# Patient Record
Sex: FEMALE | Race: BLACK OR AFRICAN AMERICAN | Employment: OTHER | URBAN - METROPOLITAN AREA
[De-identification: names, ages, dates, MRNs, and addresses within clinical notes are randomized per-mention and may not be internally consistent; named-entity substitution may affect disease eponyms.]

---

## 2017-10-06 RX ORDER — DEXTROMETHORPHAN/PSEUDOEPHED 2.5-7.5/.8
1.2 DROPS ORAL
Status: CANCELLED | OUTPATIENT
Start: 2017-10-06

## 2017-10-06 RX ORDER — ATROPINE SULFATE 0.1 MG/ML
0.5 INJECTION INTRAVENOUS
Status: CANCELLED | OUTPATIENT
Start: 2017-10-06 | End: 2017-10-07

## 2017-10-06 RX ORDER — EPINEPHRINE 0.1 MG/ML
1 INJECTION INTRACARDIAC; INTRAVENOUS
Status: CANCELLED | OUTPATIENT
Start: 2017-10-06 | End: 2017-10-07

## 2017-10-09 ENCOUNTER — HOSPITAL ENCOUNTER (OUTPATIENT)
Age: 62
Setting detail: OUTPATIENT SURGERY
Discharge: HOME | End: 2017-10-09
Attending: INTERNAL MEDICINE | Admitting: INTERNAL MEDICINE
Payer: COMMERCIAL

## 2017-10-09 VITALS
HEART RATE: 53 BPM | OXYGEN SATURATION: 98 % | WEIGHT: 209 LBS | RESPIRATION RATE: 16 BRPM | SYSTOLIC BLOOD PRESSURE: 110 MMHG | DIASTOLIC BLOOD PRESSURE: 71 MMHG | TEMPERATURE: 96 F | BODY MASS INDEX: 37.03 KG/M2 | HEIGHT: 63 IN

## 2017-10-09 PROCEDURE — G0500 MOD SEDAT ENDO SERVICE >5YRS: HCPCS | Performed by: INTERNAL MEDICINE

## 2017-10-09 PROCEDURE — 76040000007: Performed by: INTERNAL MEDICINE

## 2017-10-09 PROCEDURE — 74011250636 HC RX REV CODE- 250/636

## 2017-10-09 PROCEDURE — 74011250636 HC RX REV CODE- 250/636: Performed by: INTERNAL MEDICINE

## 2017-10-09 PROCEDURE — 77030020256 HC SOL INJ NACL 0.9%  500ML: Performed by: INTERNAL MEDICINE

## 2017-10-09 RX ORDER — SODIUM CHLORIDE 9 MG/ML
100 INJECTION, SOLUTION INTRAVENOUS CONTINUOUS
Status: DISCONTINUED | OUTPATIENT
Start: 2017-10-09 | End: 2017-10-09 | Stop reason: HOSPADM

## 2017-10-09 RX ORDER — FLUMAZENIL 0.1 MG/ML
0.2 INJECTION INTRAVENOUS
Status: DISCONTINUED | OUTPATIENT
Start: 2017-10-09 | End: 2017-10-09 | Stop reason: HOSPADM

## 2017-10-09 RX ORDER — MIDAZOLAM HYDROCHLORIDE 1 MG/ML
.5-5 INJECTION, SOLUTION INTRAMUSCULAR; INTRAVENOUS
Status: DISCONTINUED | OUTPATIENT
Start: 2017-10-09 | End: 2017-10-09 | Stop reason: HOSPADM

## 2017-10-09 RX ORDER — FENTANYL CITRATE 50 UG/ML
100 INJECTION, SOLUTION INTRAMUSCULAR; INTRAVENOUS
Status: DISCONTINUED | OUTPATIENT
Start: 2017-10-09 | End: 2017-10-09 | Stop reason: HOSPADM

## 2017-10-09 RX ORDER — NALOXONE HYDROCHLORIDE 0.4 MG/ML
0.4 INJECTION, SOLUTION INTRAMUSCULAR; INTRAVENOUS; SUBCUTANEOUS
Status: DISCONTINUED | OUTPATIENT
Start: 2017-10-09 | End: 2017-10-09 | Stop reason: HOSPADM

## 2017-10-09 RX ADMIN — SODIUM CHLORIDE 100 ML/HR: 900 INJECTION, SOLUTION INTRAVENOUS at 08:56

## 2017-10-09 NOTE — DISCHARGE INSTRUCTIONS
Patient armband removed and shredded    DISCHARGE SUMMARY from Nurse    The following personal items are in your possession at time of discharge:    Dental Appliances: None  Visual Aid: Glasses               Colonoscopy: What to Expect at Home  Your Recovery  After you have a colonoscopy, you will stay at the clinic for 1 to 2 hours until the medicines wear off. Then you can go home. But you will need to arrange for a ride. Your doctor will tell you when you can eat and do your other usual activities. Your doctor will talk to you about when you will need your next colonoscopy. Your doctor can help you decide how often you need to be checked. This will depend on the results of your test and your risk for colorectal cancer. After the test, you may be bloated or have gas pains. You may need to pass gas. If a biopsy was done or a polyp was removed, you may have streaks of blood in your stool (feces) for a few days. This care sheet gives you a general idea about how long it will take for you to recover. But each person recovers at a different pace. Follow the steps below to get better as quickly as possible. How can you care for yourself at home? Activity  · Rest when you feel tired. · You can do your normal activities when it feels okay to do so. Diet  · Follow your doctor's directions for eating. · Unless your doctor has told you not to, drink plenty of fluids. This helps to replace the fluids that were lost during the colon prep. · Do not drink alcohol. Medicines  · Your doctor will tell you if and when you can restart your medicines. He or she will also give you instructions about taking any new medicines. · If you take blood thinners, such as warfarin (Coumadin), clopidogrel (Plavix), or aspirin, be sure to talk to your doctor. He or she will tell you if and when to start taking those medicines again. Make sure that you understand exactly what your doctor wants you to do.   · If polyps were removed or a biopsy was done during the test, your doctor may tell you not to take aspirin or other anti-inflammatory medicines for a few days. These include ibuprofen (Advil, Motrin) and naproxen (Aleve). Other instructions  · For your safety, do not drive or operate machinery until the medicine wears off and you can think clearly. Your doctor may tell you not to drive or operate machinery until the day after your test.  · Do not sign legal documents or make major decisions until the medicine wears off and you can think clearly. The anesthesia can make it hard for you to fully understand what you are agreeing to. Follow-up care is a key part of your treatment and safety. Be sure to make and go to all appointments, and call your doctor if you are having problems. It's also a good idea to know your test results and keep a list of the medicines you take. When should you call for help? Call 911 anytime you think you may need emergency care. For example, call if:  · You passed out (lost consciousness). · You pass maroon or bloody stools. · You have severe belly pain. Call your doctor now or seek immediate medical care if:  · Your stools are black and tarlike. · Your stools have streaks of blood, but you did not have a biopsy or any polyps removed. · You have belly pain, or your belly is swollen and firm. · You vomit. · You have a fever. · You are very dizzy. Watch closely for changes in your health, and be sure to contact your doctor if you have any problems. Where can you learn more? Go to http://shay-anisa.info/. Enter E264 in the search box to learn more about \"Colonoscopy: What to Expect at Home. \"  Current as of: August 9, 2016  Content Version: 11.3  © 7501-9951 [employer]. Care instructions adapted under license by [employer] (which disclaims liability or warranty for this information).  If you have questions about a medical condition or this instruction, always ask your healthcare professional. [address] any warranty or liability for your use of this information. PATIENT INSTRUCTIONS:    After general anesthesia or intravenous sedation, for 24 hours or while taking prescription Narcotics:  · Limit your activities  · Do not drive and operate hazardous machinery  · Do not make important personal or business decisions  · Do  not drink alcoholic beverages  · If you have not urinated within 8 hours after discharge, please contact your surgeon on call. Report the following to your surgeon:  · Excessive pain, swelling, redness or odor of or around the surgical area  · Temperature over 100.5  · Nausea and vomiting lasting longer than 4 hours or if unable to take medications  · Any signs of decreased circulation or nerve impairment to extremity: change in color, persistent  numbness, tingling, coldness or increase pain  · Any questions        What to do at Home:  Recommended activity: Activity as tolerated and no driving for today,     If you experience any of the following symptoms as above, please follow up with Dr. Reema Cleveland. *  Please give a list of your current medications to your Primary Care Provider. *  Please update this list whenever your medications are discontinued, doses are      changed, or new medications (including over-the-counter products) are added. *  Please carry medication information at all times in case of emergency situations. These are general instructions for a healthy lifestyle:    No smoking/ No tobacco products/ Avoid exposure to second hand smoke    Surgeon General's Warning:  Quitting smoking now greatly reduces serious risk to your health.     Obesity, smoking, and sedentary lifestyle greatly increases your risk for illness    A healthy diet, regular physical exercise & weight monitoring are important for maintaining a healthy lifestyle    You may be retaining fluid if you have a history of heart failure or if you experience any of the following symptoms:  Weight gain of 3 pounds or more overnight or 5 pounds in a week, increased swelling in our hands or feet or shortness of breath while lying flat in bed. Please call your doctor as soon as you notice any of these symptoms; do not wait until your next office visit. Recognize signs and symptoms of STROKE:    F-face looks uneven    A-arms unable to move or move unevenly    S-speech slurred or non-existent    T-time-call 911 as soon as signs and symptoms begin-DO NOT go       Back to bed or wait to see if you get better-TIME IS BRAIN. Warning Signs of HEART ATTACK     Call 911 if you have these symptoms:   Chest discomfort. Most heart attacks involve discomfort in the center of the chest that lasts more than a few minutes, or that goes away and comes back. It can feel like uncomfortable pressure, squeezing, fullness, or pain.  Discomfort in other areas of the upper body. Symptoms can include pain or discomfort in one or both arms, the back, neck, jaw, or stomach.  Shortness of breath with or without chest discomfort.  Other signs may include breaking out in a cold sweat, nausea, or lightheadedness. Don't wait more than five minutes to call 911 - MINUTES MATTER! Fast action can save your life. Calling 911 is almost always the fastest way to get lifesaving treatment. Emergency Medical Services staff can begin treatment when they arrive -- up to an hour sooner than if someone gets to the hospital by car. The discharge information has been reviewed with the patient and caregiver. The patient and caregiver verbalized understanding. Discharge medications reviewed with the patient and caregiver and appropriate educational materials and side effects teaching were provided.

## 2017-10-09 NOTE — IP AVS SNAPSHOT
Renetta Tan 
 
 
 [address] [facility] [address] 
[phone] Patient: Amaryllis Angelucci MRN: [id number] NAHUN:[birth year] You are allergic to the following Allergen Reactions Ultram (Tramadol) Itching Recent Documentation Height Weight Breastfeeding? BMI OB Status Smoking Status 1.6 m 94.8 kg No 37.02 kg/m2 Postmenopausal Current Every Day Smoker Emergency Contacts Name Discharge Info Relation Home Work Zeferino Calderón   [phone] About your hospitalization You were admitted on:  October 9, 2017 You last received care in the:  [facility] ENDOSCOPY You were discharged on:  October 9, 2017 Unit phone number:  [phone] Why you were hospitalized Your primary diagnosis was:  Not on File Providers Seen During Your Hospitalizations Provider Role Specialty Primary office phone Carlyn Vera MD Attending Provider Gastroenterology [phone] Your Primary Care Physician (PCP) Primary Care Physician Office Phone Office Fax Bossman Talavera [phone] Follow-up Information Follow up With Details Comments Contact Info Cait Suresh MD   [address] [place] [address] 
[phone] Carlyn Vera MD   [address] [place] [address] [id number] [address] [place] [address] 
[phone] Current Discharge Medication List  
  
Notice You have not been prescribed any medications. Discharge Instructions Patient armband removed and shredded DISCHARGE SUMMARY from Nurse The following personal items are in your possession at time of discharge: 
 
Dental Appliances: None Visual Aid: Glasses Colonoscopy: What to Expect at [facility] Your Recovery After you have a colonoscopy, you will stay at the clinic for 1 to 2 hours until the medicines wear off. Then you can go home. But you will need to arrange for a ride. Your doctor will tell you when you can eat and do your other usual activities. Your doctor will talk to you about when you will need your next colonoscopy. Your doctor can help you decide how often you need to be checked. This will depend on the results of your test and your risk for colorectal cancer. After the test, you may be bloated or have gas pains. You may need to pass gas. If a biopsy was done or a polyp was removed, you may have streaks of blood in your stool (feces) for a few days. This care sheet gives you a general idea about how long it will take for you to recover. But each person recovers at a different pace. Follow the steps below to get better as quickly as possible. How can you care for yourself at home? Activity · Rest when you feel tired. · You can do your normal activities when it feels okay to do so. Diet · Follow your doctor's directions for eating. · Unless your doctor has told you not to, drink plenty of fluids. This helps to replace the fluids that were lost during the colon prep. · Do not drink alcohol. Medicines · Your doctor will tell you if and when you can restart your medicines. He or she will also give you instructions about taking any new medicines. · If you take blood thinners, such as warfarin (Coumadin), clopidogrel (Plavix), or aspirin, be sure to talk to your doctor. He or she will tell you if and when to start taking those medicines again. Make sure that you understand exactly what your doctor wants you to do. · If polyps were removed or a biopsy was done during the test, your doctor may tell you not to take aspirin or other anti-inflammatory medicines for a few days. These include ibuprofen (Advil, Motrin) and naproxen (Aleve). Other instructions · For your safety, do not drive or operate machinery until the medicine wears off and you can think clearly. Your doctor may tell you not to drive or operate machinery until the day after your test. 
· Do not sign legal documents or make major decisions until the medicine wears off and you can think clearly. The anesthesia can make it hard for you to fully understand what you are agreeing to. Follow-up care is a key part of your treatment and safety. Be sure to make and go to all appointments, and call your doctor if you are having problems. It's also a good idea to know your test results and keep a list of the medicines you take. When should you call for help? Call 911 anytime you think you may need emergency care. For example, call if: 
· You passed out (lost consciousness). · You pass maroon or bloody stools. · You have severe belly pain. Call your doctor now or seek immediate medical care if: 
· Your stools are black and tarlike. · Your stools have streaks of blood, but you did not have a biopsy or any polyps removed. · You have belly pain, or your belly is swollen and firm. · You vomit. · You have a fever. · You are very dizzy. Watch closely for changes in your health, and be sure to contact your doctor if you have any problems. Where can you learn more? Go to http://shay-anisa.info/. Enter E264 in the search box to learn more about \"Colonoscopy: What to Expect at Home. \" Current as of: August 9, 2016 Content Version: 11.3 © 4728-7382 [employer]. Care instructions adapted under license by [employer] (which disclaims liability or warranty for this information). If you have questions about a medical condition or this instruction, always ask your healthcare professional. [place] [address] any warranty or liability for your use of this information. PATIENT INSTRUCTIONS: 
 
 
F-face looks uneven A-arms unable to move or move unevenly S-speech slurred or non-existent T-time-call 911 as soon as signs and symptoms begin-DO NOT go Back to bed or wait to see if you get better-TIME IS BRAIN. Warning Signs of HEART ATTACK Call 911 if you have these symptoms: 
? Chest discomfort. Most heart attacks involve discomfort in the center of the chest that lasts more than a few minutes, or that goes away and comes back. It can feel like uncomfortable pressure, squeezing, fullness, or pain. ? Discomfort in other areas of the upper body. Symptoms can include pain or discomfort in one or both arms, the back, neck, jaw, or stomach. ? Shortness of breath with or without chest discomfort. ? Other signs may include breaking out in a cold sweat, nausea, or lightheadedness. Don't wait more than five minutes to call [address]! Fast action can save your life. Calling 911 is almost always the fastest way to get lifesaving treatment. Emergency Medical Services staff can begin treatment when they arrive  up to an hour sooner than if someone gets to the hospital by car. The discharge information has been reviewed with the patient and caregiver. The patient and caregiver verbalized understanding. Discharge medications reviewed with the patient and caregiver and appropriate educational materials and side effects teaching were provided. Discharge Orders None Introducing [facility] SERVICES! Michele Rico introduces [employer] patient portal. Now you can access parts of your medical record, email your doctor's office, and request medication refills online. 1. In your internet browser, go to [email or web address] 2. Click on the First Time User? Click Here link in the Sign In box.  You will see the New Member Sign Up page. 3. Enter your Auditude Access Code exactly as it appears below. You will not need to use this code after youve completed the sign-up process. If you do not sign up before the expiration date, you must request a new code. · Auditude Access Code: [email or web address] Expires: 1/2/2018 11:54 AM 
 
4. Enter the last four digits of your Social Security Number (xxxx) and Date of Birth (mm/dd/yyyy) as indicated and click Submit. You will be taken to the next sign-up page. 5. Create a Auditude ID. This will be your Auditude login ID and cannot be changed, so think of one that is secure and easy to remember. 6. Create a Auditude password. You can change your password at any time. 7. Enter your Password Reset Question and Answer. This can be used at a later time if you forget your password. 8. Enter your e-mail address. You will receive e-mail notification when new information is available in [address] 9. Click Sign Up. You can now view and download portions of your medical record. 10. Click the Download Summary menu link to download a portable copy of your medical information. If you have questions, please visit the Frequently Asked Questions section of the Auditude website. Remember, Auditude is NOT to be used for urgent needs. For medical emergencies, dial 911. Now available from your iPhone and Android! General Information Please provide this summary of care documentation to your next provider. Patient Signature:  ____________________________________________________________ Date:  ____________________________________________________________  
  
[facility] Provider Signature:  ____________________________________________________________ Date:  ____________________________________________________________

## 2017-10-09 NOTE — PROCEDURES
[facility]  Colonoscopy Procedure Report  _______________________________________________________  Patient: Betsy Navas                                         Attending Physician: Jean Clifford MD    Patient ID: [id number]                                      Referring Physician: Marisa Woody MD    Exam Date: October 9, 2017 _______________________________________________________      Introduction: A  63 y.o. female patient, presents for outpatient Colonoscopy    Indications: Screen colon cancer, average risk and asymptomatic. She had a negative colonoscopy 10 years ago    Consent: The benefits, risks, and alternatives to the procedure were discussed and informed consent was obtained from the patient. Preparation: EKG, pulse, pulse oximetry and blood pressure were monitored throughout the procedure. ASA Classification: Class 1 - . The heart is an S1-S2 and regular heart rate and rhythm. Lungs are clear to auscultation and percussion. Abdomen is soft, nondistended, and nontender. Mental Status: awake, alert, and oriented to person, place, and time    Medications:  · Fentanyl 100 mcg IV before procedure. · Versed 5 mg IV throughout the procedure. Rectal Exam: Few external anal skin tags, slightly decrease anal sphincter tone. No Blood. Pathology Specimens: No specimens removed. Procedure: The colonoscope was passed with ease through the anus under direct visualization and advanced to the cecum and 15 cm inside the terminal ileum. The patient required positioning on the back to aid in the passage of the scope. The scope was withdrawn and the mucosa was carefully examined. The quality of the preparation was excellent. The views were excellent. The patient's toleration of the procedure was excellent. Retroflexion was preformed in the ascending colon and hepatic flexure. The exam was done twice to the cecum.  Total time is 22 minutes and withdrawal time is 8 minutes. Findings:    Rectum:   Small internal hemorrhoids  Sigmoid:   Slightly tortuous. Descending Colon:   Normal  Transverse Colon:   Normal  Ascending Colon:   Normal  Cecum:   Normal  Terminal Ileum:   Normal    Unplanned Events: There were no unplanned events. Estimated Blood Loss: None  Impressions:  Few external anal skin tags, slightly decrease anal sphincter tone. Small internal hemorrhoids grade 2 . Slightly tortuous sigmoid colon. Normal Mucosa. No diverticula or polyps found. Complications: None; patient tolerated the procedure well. Recommendations:  · Discharge home when standard parameters are met. · Resume a high fiber diet. · Colonoscopy recommendation in 10 years.     Procedure Codes:    · COLONOSCOPY [[id number] (Type: Custom)]    Endoscope Information:  Model Number(s)    [id number]     Assistant: None      Signed By: Rojelio Timmons MD Date: October 9, 2017

## 2018-01-02 ENCOUNTER — HOSPITAL ENCOUNTER (OUTPATIENT)
Dept: PREADMISSION TESTING | Age: 63
Discharge: HOME | End: 2018-01-02
Payer: COMMERCIAL

## 2018-01-02 DIAGNOSIS — Z01.818 PREOP EXAMINATION: ICD-10-CM

## 2018-01-02 LAB
ABO + RH BLD: NORMAL
ALBUMIN SERPL-MCNC: 3.8 G/DL (ref 3.4–5)
ALBUMIN/GLOB SERPL: 0.9 {RATIO} (ref 0.8–1.7)
ALP SERPL-CCNC: 99 U/L (ref 45–117)
ALT SERPL-CCNC: 28 U/L (ref 13–56)
ANION GAP SERPL CALC-SCNC: 8 MMOL/L (ref 3–18)
APPEARANCE UR: CLEAR
APTT PPP: 32.6 SEC (ref 23–36.4)
AST SERPL-CCNC: 19 U/L (ref 15–37)
ATRIAL RATE: 59 BPM
BACTERIA SPEC CULT: NORMAL
BACTERIA URNS QL MICRO: ABNORMAL /HPF
BASOPHILS # BLD: 0 K/UL (ref 0–0.06)
BASOPHILS NFR BLD: 1 % (ref 0–2)
BILIRUB SERPL-MCNC: 0.5 MG/DL (ref 0.2–1)
BILIRUB UR QL: NEGATIVE
BLOOD GROUP ANTIBODIES SERPL: NORMAL
BUN SERPL-MCNC: 16 MG/DL (ref 7–18)
BUN/CREAT SERPL: 17 (ref 12–20)
CALCIUM SERPL-MCNC: 10.6 MG/DL (ref 8.5–10.1)
CALCULATED P AXIS, ECG09: 59 DEGREES
CALCULATED R AXIS, ECG10: -5 DEGREES
CALCULATED T AXIS, ECG11: 20 DEGREES
CHLORIDE SERPL-SCNC: 106 MMOL/L (ref 100–108)
CO2 SERPL-SCNC: 31 MMOL/L (ref 21–32)
COLOR UR: YELLOW
CREAT SERPL-MCNC: 0.93 MG/DL (ref 0.6–1.3)
DIAGNOSIS, 93000: NORMAL
DIFFERENTIAL METHOD BLD: ABNORMAL
EOSINOPHIL # BLD: 0.1 K/UL (ref 0–0.4)
EOSINOPHIL NFR BLD: 2 % (ref 0–5)
EPITH CASTS URNS QL MICRO: ABNORMAL /LPF (ref 0–5)
ERYTHROCYTE [DISTWIDTH] IN BLOOD BY AUTOMATED COUNT: 15 % (ref 11.6–14.5)
ERYTHROCYTE [SEDIMENTATION RATE] IN BLOOD: 18 MM/HR (ref 0–30)
EST. AVERAGE GLUCOSE BLD GHB EST-MCNC: 105 MG/DL
GLOBULIN SER CALC-MCNC: 4.2 G/DL (ref 2–4)
GLUCOSE SERPL-MCNC: 84 MG/DL (ref 74–99)
GLUCOSE UR STRIP.AUTO-MCNC: NEGATIVE MG/DL
HBA1C MFR BLD: 5.3 % (ref 4.5–5.6)
HCT VFR BLD AUTO: 40.8 % (ref 35–45)
HGB BLD-MCNC: 13 G/DL (ref 12–16)
HGB UR QL STRIP: NEGATIVE
INR PPP: 1 (ref 0.8–1.2)
KETONES UR QL STRIP.AUTO: NEGATIVE MG/DL
LEUKOCYTE ESTERASE UR QL STRIP.AUTO: ABNORMAL
LYMPHOCYTES # BLD: 2.8 K/UL (ref 0.9–3.6)
LYMPHOCYTES NFR BLD: 53 % (ref 21–52)
MCH RBC QN AUTO: 27.7 PG (ref 24–34)
MCHC RBC AUTO-ENTMCNC: 31.9 G/DL (ref 31–37)
MCV RBC AUTO: 86.8 FL (ref 74–97)
MONOCYTES # BLD: 0.5 K/UL (ref 0.05–1.2)
MONOCYTES NFR BLD: 10 % (ref 3–10)
NEUTS SEG # BLD: 1.8 K/UL (ref 1.8–8)
NEUTS SEG NFR BLD: 34 % (ref 40–73)
NITRITE UR QL STRIP.AUTO: POSITIVE
P-R INTERVAL, ECG05: 168 MS
PH UR STRIP: 6 [PH] (ref 5–8)
PLATELET # BLD AUTO: 397 K/UL (ref 135–420)
PMV BLD AUTO: 9.1 FL (ref 9.2–11.8)
POTASSIUM SERPL-SCNC: 4.2 MMOL/L (ref 3.5–5.5)
PROT SERPL-MCNC: 8 G/DL (ref 6.4–8.2)
PROT UR STRIP-MCNC: NEGATIVE MG/DL
PROTHROMBIN TIME: 12.8 SEC (ref 11.5–15.2)
Q-T INTERVAL, ECG07: 420 MS
QRS DURATION, ECG06: 84 MS
QTC CALCULATION (BEZET), ECG08: 415 MS
RBC # BLD AUTO: 4.7 M/UL (ref 4.2–5.3)
RBC #/AREA URNS HPF: ABNORMAL /HPF (ref 0–5)
SERVICE CMNT-IMP: NORMAL
SODIUM SERPL-SCNC: 145 MMOL/L (ref 136–145)
SP GR UR REFRACTOMETRY: 1.02 (ref 1–1.03)
SPECIMEN EXP DATE BLD: NORMAL
UROBILINOGEN UR QL STRIP.AUTO: 1 EU/DL (ref 0.2–1)
VENTRICULAR RATE, ECG03: 59 BPM
WBC # BLD AUTO: 5.3 K/UL (ref 4.6–13.2)
WBC URNS QL MICRO: ABNORMAL /HPF (ref 0–5)

## 2018-01-02 PROCEDURE — 86900 BLOOD TYPING SEROLOGIC ABO: CPT | Performed by: ORTHOPAEDIC SURGERY

## 2018-01-02 PROCEDURE — 85610 PROTHROMBIN TIME: CPT | Performed by: ORTHOPAEDIC SURGERY

## 2018-01-02 PROCEDURE — 81001 URINALYSIS AUTO W/SCOPE: CPT | Performed by: ORTHOPAEDIC SURGERY

## 2018-01-02 PROCEDURE — 80053 COMPREHEN METABOLIC PANEL: CPT | Performed by: ORTHOPAEDIC SURGERY

## 2018-01-02 PROCEDURE — 85025 COMPLETE CBC W/AUTO DIFF WBC: CPT | Performed by: ORTHOPAEDIC SURGERY

## 2018-01-02 PROCEDURE — 83036 HEMOGLOBIN GLYCOSYLATED A1C: CPT | Performed by: ORTHOPAEDIC SURGERY

## 2018-01-02 PROCEDURE — 36415 COLL VENOUS BLD VENIPUNCTURE: CPT | Performed by: ORTHOPAEDIC SURGERY

## 2018-01-02 PROCEDURE — 85730 THROMBOPLASTIN TIME PARTIAL: CPT | Performed by: ORTHOPAEDIC SURGERY

## 2018-01-02 PROCEDURE — 85652 RBC SED RATE AUTOMATED: CPT | Performed by: ORTHOPAEDIC SURGERY

## 2018-01-02 PROCEDURE — 93005 ELECTROCARDIOGRAM TRACING: CPT

## 2018-01-02 PROCEDURE — 87641 MR-STAPH DNA AMP PROBE: CPT | Performed by: ORTHOPAEDIC SURGERY

## 2018-01-07 ENCOUNTER — ANESTHESIA EVENT (OUTPATIENT)
Dept: SURGERY | Age: 63
DRG: 470 | End: 2018-01-07
Payer: COMMERCIAL

## 2018-01-07 PROBLEM — M17.11 OSTEOARTHRITIS OF RIGHT KNEE: Chronic | Status: ACTIVE | Noted: 2018-01-07

## 2018-01-07 NOTE — H&P
[address] [facility]  History and Physical Exam    Patient: Hari Allen MRN: [id number]  SSN: [id number]    YOB: 1955  Age: 63 y.o. Sex: female      Subjective:      Chief Complaint: Right knee pain    History of Present Illness:  Patient complains of pain to the right knee and difficulty ambulating, which has progressively worsened over several months. X-rays showed osteoarthritis of the joint. The patient's pain has persisted and progressed despite conservative treatments and therapies. The patient has been previously treated with NsAIDs. The patient has at this time opted for surgical intervention. Past Medical History:   Diagnosis Date    OA (osteoarthritis)     right knee    Osteoarthritis of right knee 1/7/2018     Past Surgical History:   Procedure Laterality Date    COLONOSCOPY N/A 10/9/2017    COLONOSCOPY performed by Twin Wilkins MD at [place] [address] Left 06/30/2016    carpal tunnel release    HX TUBAL LIGATION       Social History     Occupational History    Not on file. Social History Main Topics    Smoking status: Current Every Day Smoker     Packs/day: 0.25     Years: 10.00    Smokeless tobacco: Never Used      Comment: no smoking 24 hours prior to surgery    Alcohol use Yes      Comment: 2-3 beers per month    Drug use: No    Sexual activity: Not on file     Prior to Admission medications    Medication Sig Start Date End Date Taking? Authorizing Provider   ciprofloxacin HCl (CIPRO) 100 mg tablet Take 100 mg by mouth two (2) times a day. Indications: UTI    Historical Provider       Allergies: Allergies   Allergen Reactions    Ultram [Tramadol] Itching     Nervousness, can't sleep        Review of Systems:  Pertinent items are noted in the History of Present Illness.     Objective:       Physical Exam:  HEENT: Normocephalic, atraumatic  Lungs:  Clear to auscultation  Heart:   Regular rate and rhythm  Abdomen: Soft  Extremities:  Pain with range of motion of the right knee. Active ROM limited due to pain. Tenderness generalized. Crepitus present. Antalgic gait. Assessment:      Arthritis of the right knee. Plan:       Proceed with scheduled RIGHT TOTAL KNEE ARTHROPLASTY. The various methods of treatment have been discussed with the patient and family. After consideration of risks, benefits, and other options for treatment, the patient has consented to surgical interventions. Questions were answered and preoperative teaching was done by Dr Shai Jose.      Signed By: TIMO Perez     January 7, 2018

## 2018-01-08 ENCOUNTER — ANESTHESIA (OUTPATIENT)
Dept: SURGERY | Age: 63
DRG: 470 | End: 2018-01-08
Payer: COMMERCIAL

## 2018-01-08 ENCOUNTER — APPOINTMENT (OUTPATIENT)
Dept: GENERAL RADIOLOGY | Age: 63
DRG: 470 | End: 2018-01-08
Attending: PHYSICIAN ASSISTANT
Payer: COMMERCIAL

## 2018-01-08 ENCOUNTER — HOSPITAL ENCOUNTER (INPATIENT)
Age: 63
LOS: 1 days | Discharge: HOME HEALTH | DRG: 470 | End: 2018-01-09
Attending: ORTHOPAEDIC SURGERY | Admitting: ORTHOPAEDIC SURGERY
Payer: COMMERCIAL

## 2018-01-08 DIAGNOSIS — M17.11 PRIMARY OSTEOARTHRITIS OF RIGHT KNEE: Primary | Chronic | ICD-10-CM

## 2018-01-08 LAB — GLUCOSE BLD STRIP.AUTO-MCNC: 157 MG/DL (ref 70–110)

## 2018-01-08 PROCEDURE — 77030016060 HC NDL NRV BLK TELE -A: Performed by: ANESTHESIOLOGY

## 2018-01-08 PROCEDURE — C1713 ANCHOR/SCREW BN/BN,TIS/BN: HCPCS | Performed by: ORTHOPAEDIC SURGERY

## 2018-01-08 PROCEDURE — 77030020813 HC INST SCULP CEM KT DISP S&N -B: Performed by: ORTHOPAEDIC SURGERY

## 2018-01-08 PROCEDURE — 77030034479 HC ADH SKN CLSR PRINEO J&J -B: Performed by: ORTHOPAEDIC SURGERY

## 2018-01-08 PROCEDURE — 97161 PT EVAL LOW COMPLEX 20 MIN: CPT

## 2018-01-08 PROCEDURE — 74011000258 HC RX REV CODE- 258: Performed by: ORTHOPAEDIC SURGERY

## 2018-01-08 PROCEDURE — 74011250637 HC RX REV CODE- 250/637: Performed by: PHYSICIAN ASSISTANT

## 2018-01-08 PROCEDURE — 77030020782 HC GWN BAIR PAWS FLX 3M -B: Performed by: ORTHOPAEDIC SURGERY

## 2018-01-08 PROCEDURE — 76942 ECHO GUIDE FOR BIOPSY: CPT | Performed by: ORTHOPAEDIC SURGERY

## 2018-01-08 PROCEDURE — 77030037875 HC DRSG MEPILEX <16IN BORD MOLN -A: Performed by: ORTHOPAEDIC SURGERY

## 2018-01-08 PROCEDURE — 74011250636 HC RX REV CODE- 250/636

## 2018-01-08 PROCEDURE — 77030032489 HC SLV COMPR SCD FT CUF COVD -B: Performed by: ORTHOPAEDIC SURGERY

## 2018-01-08 PROCEDURE — 77030002934 HC SUT MCRYL J&J -B: Performed by: ORTHOPAEDIC SURGERY

## 2018-01-08 PROCEDURE — C9290 INJ, BUPIVACAINE LIPOSOME: HCPCS | Performed by: ORTHOPAEDIC SURGERY

## 2018-01-08 PROCEDURE — 76010000153 HC OR TIME 1.5 TO 2 HR: Performed by: ORTHOPAEDIC SURGERY

## 2018-01-08 PROCEDURE — 77030027138 HC INCENT SPIROMETER -A

## 2018-01-08 PROCEDURE — 82962 GLUCOSE BLOOD TEST: CPT

## 2018-01-08 PROCEDURE — 77030013708 HC HNDPC SUC IRR PULS STRY –B: Performed by: ORTHOPAEDIC SURGERY

## 2018-01-08 PROCEDURE — 77030031139 HC SUT VCRL2 J&J -A: Performed by: ORTHOPAEDIC SURGERY

## 2018-01-08 PROCEDURE — 74011250636 HC RX REV CODE- 250/636: Performed by: ORTHOPAEDIC SURGERY

## 2018-01-08 PROCEDURE — 76210000016 HC OR PH I REC 1 TO 1.5 HR: Performed by: ORTHOPAEDIC SURGERY

## 2018-01-08 PROCEDURE — 74011000250 HC RX REV CODE- 250

## 2018-01-08 PROCEDURE — 77030038010: Performed by: ORTHOPAEDIC SURGERY

## 2018-01-08 PROCEDURE — 76060000034 HC ANESTHESIA 1.5 TO 2 HR: Performed by: ORTHOPAEDIC SURGERY

## 2018-01-08 PROCEDURE — 77030020259 HC SOL INJ SOD CL 0.9% 100ML BG: Performed by: ORTHOPAEDIC SURGERY

## 2018-01-08 PROCEDURE — 77030011628: Performed by: ORTHOPAEDIC SURGERY

## 2018-01-08 PROCEDURE — 97116 GAIT TRAINING THERAPY: CPT

## 2018-01-08 PROCEDURE — 77030003666 HC NDL SPINAL BD -A: Performed by: ORTHOPAEDIC SURGERY

## 2018-01-08 PROCEDURE — 74011000250 HC RX REV CODE- 250: Performed by: PHYSICIAN ASSISTANT

## 2018-01-08 PROCEDURE — 64450 NJX AA&/STRD OTHER PN/BRANCH: CPT | Performed by: ANESTHESIOLOGY

## 2018-01-08 PROCEDURE — 74011250637 HC RX REV CODE- 250/637: Performed by: ANESTHESIOLOGY

## 2018-01-08 PROCEDURE — 74011250636 HC RX REV CODE- 250/636: Performed by: PHYSICIAN ASSISTANT

## 2018-01-08 PROCEDURE — 77030011640 HC PAD GRND REM COVD -A: Performed by: ORTHOPAEDIC SURGERY

## 2018-01-08 PROCEDURE — 77030018836 HC SOL IRR NACL ICUM -A: Performed by: ORTHOPAEDIC SURGERY

## 2018-01-08 PROCEDURE — 0SRC0J9 REPLACEMENT OF RIGHT KNEE JOINT WITH SYNTHETIC SUBSTITUTE, CEMENTED, OPEN APPROACH: ICD-10-PCS | Performed by: ORTHOPAEDIC SURGERY

## 2018-01-08 PROCEDURE — 77030027138 HC INCENT SPIROMETER -A: Performed by: ORTHOPAEDIC SURGERY

## 2018-01-08 PROCEDURE — 77030036563 HC WRP CLD THER KNE S2SG -B: Performed by: ORTHOPAEDIC SURGERY

## 2018-01-08 PROCEDURE — C1776 JOINT DEVICE (IMPLANTABLE): HCPCS | Performed by: ORTHOPAEDIC SURGERY

## 2018-01-08 PROCEDURE — 74011000250 HC RX REV CODE- 250: Performed by: ORTHOPAEDIC SURGERY

## 2018-01-08 PROCEDURE — 77030012891

## 2018-01-08 PROCEDURE — 97530 THERAPEUTIC ACTIVITIES: CPT

## 2018-01-08 PROCEDURE — 73560 X-RAY EXAM OF KNEE 1 OR 2: CPT

## 2018-01-08 PROCEDURE — 74011250636 HC RX REV CODE- 250/636: Performed by: ANESTHESIOLOGY

## 2018-01-08 PROCEDURE — 77030034694 HC SCPL CANADY PLSM DISP USMD -E: Performed by: ORTHOPAEDIC SURGERY

## 2018-01-08 PROCEDURE — 77030012508 HC MSK AIRWY LMA AMBU -A: Performed by: ANESTHESIOLOGY

## 2018-01-08 PROCEDURE — 65270000029 HC RM PRIVATE

## 2018-01-08 DEVICE — INSERT TIB RP FEM KNEE CEM: Type: IMPLANTABLE DEVICE | Site: KNEE | Status: FUNCTIONAL

## 2018-01-08 DEVICE — CEMENT BNE 20GM HALF DOSE PMMA VISC RADPQ FAST: Type: IMPLANTABLE DEVICE | Site: KNEE | Status: FUNCTIONAL

## 2018-01-08 DEVICE — COMPONENT FEM SZ 4 R KNEE POST STBL CEM ATTUNE: Type: IMPLANTABLE DEVICE | Site: KNEE | Status: FUNCTIONAL

## 2018-01-08 DEVICE — COMPONENT PAT DIA35MM KNEE POLY DOME CEM MEDIALIZED ATTUNE: Type: IMPLANTABLE DEVICE | Site: KNEE | Status: FUNCTIONAL

## 2018-01-08 RX ORDER — FENTANYL CITRATE 50 UG/ML
INJECTION, SOLUTION INTRAMUSCULAR; INTRAVENOUS AS NEEDED
Status: DISCONTINUED | OUTPATIENT
Start: 2018-01-08 | End: 2018-01-08 | Stop reason: HOSPADM

## 2018-01-08 RX ORDER — LIDOCAINE HYDROCHLORIDE 20 MG/ML
INJECTION, SOLUTION EPIDURAL; INFILTRATION; INTRACAUDAL; PERINEURAL AS NEEDED
Status: DISCONTINUED | OUTPATIENT
Start: 2018-01-08 | End: 2018-01-08 | Stop reason: HOSPADM

## 2018-01-08 RX ORDER — LANOLIN ALCOHOL/MO/W.PET/CERES
1 CREAM (GRAM) TOPICAL 3 TIMES DAILY
Status: DISCONTINUED | OUTPATIENT
Start: 2018-01-08 | End: 2018-01-09 | Stop reason: HOSPADM

## 2018-01-08 RX ORDER — PANTOPRAZOLE SODIUM 40 MG/1
40 TABLET, DELAYED RELEASE ORAL DAILY
Status: DISCONTINUED | OUTPATIENT
Start: 2018-01-08 | End: 2018-01-09 | Stop reason: HOSPADM

## 2018-01-08 RX ORDER — TRANEXAMIC ACID 100 MG/ML
1 INJECTION, SOLUTION INTRAVENOUS SEE ADMIN INSTRUCTIONS
Status: COMPLETED | OUTPATIENT
Start: 2018-01-08 | End: 2018-01-08

## 2018-01-08 RX ORDER — DEXAMETHASONE SODIUM PHOSPHATE 4 MG/ML
8 INJECTION, SOLUTION INTRA-ARTICULAR; INTRALESIONAL; INTRAMUSCULAR; INTRAVENOUS; SOFT TISSUE
Status: COMPLETED | OUTPATIENT
Start: 2018-01-08 | End: 2018-01-08

## 2018-01-08 RX ORDER — MELOXICAM 7.5 MG/1
7.5 TABLET ORAL 2 TIMES DAILY
Qty: 28 TAB | Refills: 0 | Status: SHIPPED | OUTPATIENT
Start: 2018-01-08 | End: 2018-01-22

## 2018-01-08 RX ORDER — ZOLPIDEM TARTRATE 5 MG/1
5-10 TABLET ORAL
Status: DISCONTINUED | OUTPATIENT
Start: 2018-01-08 | End: 2018-01-09 | Stop reason: HOSPADM

## 2018-01-08 RX ORDER — SODIUM CHLORIDE 0.9 % (FLUSH) 0.9 %
5-10 SYRINGE (ML) INJECTION AS NEEDED
Status: DISCONTINUED | OUTPATIENT
Start: 2018-01-08 | End: 2018-01-08 | Stop reason: HOSPADM

## 2018-01-08 RX ORDER — SODIUM CHLORIDE 0.9 % (FLUSH) 0.9 %
5-10 SYRINGE (ML) INJECTION EVERY 8 HOURS
Status: DISCONTINUED | OUTPATIENT
Start: 2018-01-08 | End: 2018-01-09 | Stop reason: HOSPADM

## 2018-01-08 RX ORDER — NALOXONE HYDROCHLORIDE 0.4 MG/ML
0.4 INJECTION, SOLUTION INTRAMUSCULAR; INTRAVENOUS; SUBCUTANEOUS AS NEEDED
Status: DISCONTINUED | OUTPATIENT
Start: 2018-01-08 | End: 2018-01-09 | Stop reason: HOSPADM

## 2018-01-08 RX ORDER — DIPHENHYDRAMINE HYDROCHLORIDE 50 MG/ML
12.5 INJECTION, SOLUTION INTRAMUSCULAR; INTRAVENOUS
Status: DISCONTINUED | OUTPATIENT
Start: 2018-01-08 | End: 2018-01-09 | Stop reason: HOSPADM

## 2018-01-08 RX ORDER — DIPHENHYDRAMINE HCL 25 MG
25 CAPSULE ORAL
Status: DISCONTINUED | OUTPATIENT
Start: 2018-01-08 | End: 2018-01-09 | Stop reason: HOSPADM

## 2018-01-08 RX ORDER — ONDANSETRON 2 MG/ML
4 INJECTION INTRAMUSCULAR; INTRAVENOUS
Status: DISCONTINUED | OUTPATIENT
Start: 2018-01-08 | End: 2018-01-09 | Stop reason: HOSPADM

## 2018-01-08 RX ORDER — MIDAZOLAM HYDROCHLORIDE 1 MG/ML
INJECTION, SOLUTION INTRAMUSCULAR; INTRAVENOUS AS NEEDED
Status: DISCONTINUED | OUTPATIENT
Start: 2018-01-08 | End: 2018-01-08 | Stop reason: HOSPADM

## 2018-01-08 RX ORDER — PREGABALIN 75 MG/1
75 CAPSULE ORAL
Status: COMPLETED | OUTPATIENT
Start: 2018-01-08 | End: 2018-01-08

## 2018-01-08 RX ORDER — METOCLOPRAMIDE HYDROCHLORIDE 5 MG/ML
10 INJECTION INTRAMUSCULAR; INTRAVENOUS
Status: DISCONTINUED | OUTPATIENT
Start: 2018-01-08 | End: 2018-01-09 | Stop reason: HOSPADM

## 2018-01-08 RX ORDER — OXYCODONE HYDROCHLORIDE 5 MG/1
5-10 TABLET ORAL
Status: DISCONTINUED | OUTPATIENT
Start: 2018-01-08 | End: 2018-01-09 | Stop reason: HOSPADM

## 2018-01-08 RX ORDER — ACETAMINOPHEN 10 MG/ML
1000 INJECTION, SOLUTION INTRAVENOUS ONCE
Status: COMPLETED | OUTPATIENT
Start: 2018-01-08 | End: 2018-01-08

## 2018-01-08 RX ORDER — CEFAZOLIN SODIUM 2 G/50ML
2 SOLUTION INTRAVENOUS ONCE
Status: COMPLETED | OUTPATIENT
Start: 2018-01-08 | End: 2018-01-08

## 2018-01-08 RX ORDER — ROPIVACAINE HYDROCHLORIDE 5 MG/ML
INJECTION, SOLUTION EPIDURAL; INFILTRATION; PERINEURAL AS NEEDED
Status: DISCONTINUED | OUTPATIENT
Start: 2018-01-08 | End: 2018-01-08 | Stop reason: HOSPADM

## 2018-01-08 RX ORDER — SODIUM CHLORIDE, SODIUM LACTATE, POTASSIUM CHLORIDE, CALCIUM CHLORIDE 600; 310; 30; 20 MG/100ML; MG/100ML; MG/100ML; MG/100ML
125 INJECTION, SOLUTION INTRAVENOUS CONTINUOUS
Status: DISCONTINUED | OUTPATIENT
Start: 2018-01-08 | End: 2018-01-09 | Stop reason: HOSPADM

## 2018-01-08 RX ORDER — HYDROMORPHONE HYDROCHLORIDE 2 MG/ML
INJECTION, SOLUTION INTRAMUSCULAR; INTRAVENOUS; SUBCUTANEOUS AS NEEDED
Status: DISCONTINUED | OUTPATIENT
Start: 2018-01-08 | End: 2018-01-08 | Stop reason: HOSPADM

## 2018-01-08 RX ORDER — SODIUM CHLORIDE, SODIUM LACTATE, POTASSIUM CHLORIDE, CALCIUM CHLORIDE 600; 310; 30; 20 MG/100ML; MG/100ML; MG/100ML; MG/100ML
125 INJECTION, SOLUTION INTRAVENOUS CONTINUOUS
Status: DISCONTINUED | OUTPATIENT
Start: 2018-01-08 | End: 2018-01-08 | Stop reason: HOSPADM

## 2018-01-08 RX ORDER — CELECOXIB 100 MG/1
400 CAPSULE ORAL
Status: COMPLETED | OUTPATIENT
Start: 2018-01-08 | End: 2018-01-08

## 2018-01-08 RX ORDER — PROPOFOL 10 MG/ML
INJECTION, EMULSION INTRAVENOUS AS NEEDED
Status: DISCONTINUED | OUTPATIENT
Start: 2018-01-08 | End: 2018-01-08 | Stop reason: HOSPADM

## 2018-01-08 RX ORDER — ASPIRIN 81 MG/1
81 TABLET ORAL 2 TIMES DAILY
Status: DISCONTINUED | OUTPATIENT
Start: 2018-01-08 | End: 2018-01-09 | Stop reason: HOSPADM

## 2018-01-08 RX ORDER — DOCUSATE SODIUM 100 MG/1
100 CAPSULE, LIQUID FILLED ORAL 2 TIMES DAILY
Status: DISCONTINUED | OUTPATIENT
Start: 2018-01-08 | End: 2018-01-09 | Stop reason: HOSPADM

## 2018-01-08 RX ORDER — ONDANSETRON 2 MG/ML
INJECTION INTRAMUSCULAR; INTRAVENOUS AS NEEDED
Status: DISCONTINUED | OUTPATIENT
Start: 2018-01-08 | End: 2018-01-08 | Stop reason: HOSPADM

## 2018-01-08 RX ORDER — OXYCODONE AND ACETAMINOPHEN 5; 325 MG/1; MG/1
TABLET ORAL
Qty: 60 TAB | Refills: 0 | Status: SHIPPED | OUTPATIENT
Start: 2018-01-08 | End: 2018-12-14

## 2018-01-08 RX ORDER — ACETAMINOPHEN 10 MG/ML
1000 INJECTION, SOLUTION INTRAVENOUS EVERY 6 HOURS
Status: DISCONTINUED | OUTPATIENT
Start: 2018-01-08 | End: 2018-01-08 | Stop reason: CLARIF

## 2018-01-08 RX ORDER — CEFAZOLIN SODIUM 2 G/50ML
2 SOLUTION INTRAVENOUS EVERY 8 HOURS
Status: COMPLETED | OUTPATIENT
Start: 2018-01-08 | End: 2018-01-09

## 2018-01-08 RX ORDER — KETOROLAC TROMETHAMINE 15 MG/ML
15 INJECTION, SOLUTION INTRAMUSCULAR; INTRAVENOUS EVERY 6 HOURS
Status: DISCONTINUED | OUTPATIENT
Start: 2018-01-08 | End: 2018-01-09 | Stop reason: HOSPADM

## 2018-01-08 RX ORDER — SODIUM CHLORIDE 0.9 % (FLUSH) 0.9 %
5-10 SYRINGE (ML) INJECTION AS NEEDED
Status: DISCONTINUED | OUTPATIENT
Start: 2018-01-08 | End: 2018-01-09 | Stop reason: HOSPADM

## 2018-01-08 RX ORDER — MELOXICAM 7.5 MG/1
7.5 TABLET ORAL 2 TIMES DAILY
Status: DISCONTINUED | OUTPATIENT
Start: 2018-01-08 | End: 2018-01-09 | Stop reason: HOSPADM

## 2018-01-08 RX ORDER — SODIUM CHLORIDE 9 MG/ML
125 INJECTION, SOLUTION INTRAVENOUS CONTINUOUS
Status: DISPENSED | OUTPATIENT
Start: 2018-01-08 | End: 2018-01-09

## 2018-01-08 RX ORDER — FLUTICASONE PROPIONATE 50 MCG
2 SPRAY, SUSPENSION (ML) NASAL DAILY
COMMUNITY
End: 2018-01-10 | Stop reason: CLARIF

## 2018-01-08 RX ORDER — DEXAMETHASONE SODIUM PHOSPHATE 4 MG/ML
8 INJECTION, SOLUTION INTRA-ARTICULAR; INTRALESIONAL; INTRAMUSCULAR; INTRAVENOUS; SOFT TISSUE ONCE
Status: DISCONTINUED | OUTPATIENT
Start: 2018-01-08 | End: 2018-01-08

## 2018-01-08 RX ORDER — ASPIRIN 81 MG/1
81 TABLET ORAL 2 TIMES DAILY
Qty: 42 TAB | Refills: 0 | Status: SHIPPED | OUTPATIENT
Start: 2018-01-08 | End: 2018-01-29

## 2018-01-08 RX ORDER — KETOROLAC TROMETHAMINE 30 MG/ML
INJECTION, SOLUTION INTRAMUSCULAR; INTRAVENOUS AS NEEDED
Status: DISCONTINUED | OUTPATIENT
Start: 2018-01-08 | End: 2018-01-08 | Stop reason: HOSPADM

## 2018-01-08 RX ORDER — ACETAMINOPHEN 325 MG/1
650 TABLET ORAL EVERY 6 HOURS
Status: DISCONTINUED | OUTPATIENT
Start: 2018-01-08 | End: 2018-01-09 | Stop reason: HOSPADM

## 2018-01-08 RX ORDER — SODIUM CHLORIDE 9 MG/ML
300 INJECTION, SOLUTION INTRAVENOUS CONTINUOUS
Status: DISPENSED | OUTPATIENT
Start: 2018-01-08 | End: 2018-01-08

## 2018-01-08 RX ADMIN — ASPIRIN 81 MG: 81 TABLET, COATED ORAL at 22:30

## 2018-01-08 RX ADMIN — PREGABALIN 75 MG: 75 CAPSULE ORAL at 10:34

## 2018-01-08 RX ADMIN — KETOROLAC TROMETHAMINE 15 MG: 15 INJECTION, SOLUTION INTRAMUSCULAR; INTRAVENOUS at 18:33

## 2018-01-08 RX ADMIN — CEFAZOLIN SODIUM 2 G: 2 SOLUTION INTRAVENOUS at 22:32

## 2018-01-08 RX ADMIN — LIDOCAINE HYDROCHLORIDE 60 MG: 20 INJECTION, SOLUTION EPIDURAL; INFILTRATION; INTRACAUDAL; PERINEURAL at 12:21

## 2018-01-08 RX ADMIN — ACETAMINOPHEN 1000 MG: 10 INJECTION, SOLUTION INTRAVENOUS at 12:24

## 2018-01-08 RX ADMIN — ROPIVACAINE HYDROCHLORIDE 20 ML: 5 INJECTION, SOLUTION EPIDURAL; INFILTRATION; PERINEURAL at 11:10

## 2018-01-08 RX ADMIN — CEFAZOLIN SODIUM 2 G: 2 SOLUTION INTRAVENOUS at 12:14

## 2018-01-08 RX ADMIN — TRANEXAMIC ACID 1 G: 100 INJECTION, SOLUTION INTRAVENOUS at 13:16

## 2018-01-08 RX ADMIN — DEXAMETHASONE SODIUM PHOSPHATE 8 MG: 4 INJECTION, SOLUTION INTRAMUSCULAR; INTRAVENOUS at 10:39

## 2018-01-08 RX ADMIN — ONDANSETRON 4 MG: 2 INJECTION INTRAMUSCULAR; INTRAVENOUS at 20:05

## 2018-01-08 RX ADMIN — HYDROMORPHONE HYDROCHLORIDE 1 MG: 2 INJECTION, SOLUTION INTRAMUSCULAR; INTRAVENOUS; SUBCUTANEOUS at 12:21

## 2018-01-08 RX ADMIN — CELECOXIB 400 MG: 100 CAPSULE ORAL at 10:34

## 2018-01-08 RX ADMIN — MELOXICAM 7.5 MG: 7.5 TABLET ORAL at 22:30

## 2018-01-08 RX ADMIN — OXYCODONE HYDROCHLORIDE 10 MG: 5 TABLET ORAL at 14:14

## 2018-01-08 RX ADMIN — KETOROLAC TROMETHAMINE 30 MG: 30 INJECTION, SOLUTION INTRAMUSCULAR; INTRAVENOUS at 12:26

## 2018-01-08 RX ADMIN — TRANEXAMIC ACID 1 G: 100 INJECTION, SOLUTION INTRAVENOUS at 12:28

## 2018-01-08 RX ADMIN — OXYCODONE HYDROCHLORIDE 5 MG: 5 TABLET ORAL at 18:33

## 2018-01-08 RX ADMIN — ACETAMINOPHEN 650 MG: 325 TABLET ORAL at 18:33

## 2018-01-08 RX ADMIN — DOCUSATE SODIUM 100 MG: 100 CAPSULE, LIQUID FILLED ORAL at 22:31

## 2018-01-08 RX ADMIN — ONDANSETRON 4 MG: 2 INJECTION INTRAMUSCULAR; INTRAVENOUS at 12:26

## 2018-01-08 RX ADMIN — PROPOFOL 200 MG: 10 INJECTION, EMULSION INTRAVENOUS at 12:21

## 2018-01-08 RX ADMIN — MIDAZOLAM HYDROCHLORIDE 2 MG: 1 INJECTION, SOLUTION INTRAMUSCULAR; INTRAVENOUS at 11:03

## 2018-01-08 RX ADMIN — SODIUM CHLORIDE, SODIUM LACTATE, POTASSIUM CHLORIDE, AND CALCIUM CHLORIDE 125 ML/HR: 600; 310; 30; 20 INJECTION, SOLUTION INTRAVENOUS at 10:32

## 2018-01-08 RX ADMIN — FERROUS SULFATE TAB 325 MG (65 MG ELEMENTAL FE) 325 MG: 325 (65 FE) TAB at 16:00

## 2018-01-08 RX ADMIN — SODIUM CHLORIDE, SODIUM LACTATE, POTASSIUM CHLORIDE, AND CALCIUM CHLORIDE: 600; 310; 30; 20 INJECTION, SOLUTION INTRAVENOUS at 13:10

## 2018-01-08 RX ADMIN — FENTANYL CITRATE 50 MCG: 50 INJECTION, SOLUTION INTRAMUSCULAR; INTRAVENOUS at 12:21

## 2018-01-08 RX ADMIN — FERROUS SULFATE TAB 325 MG (65 MG ELEMENTAL FE) 325 MG: 325 (65 FE) TAB at 22:30

## 2018-01-08 RX ADMIN — SODIUM CHLORIDE, SODIUM LACTATE, POTASSIUM CHLORIDE, AND CALCIUM CHLORIDE 1000 ML: 600; 310; 30; 20 INJECTION, SOLUTION INTRAVENOUS at 10:32

## 2018-01-08 RX ADMIN — MIDAZOLAM HYDROCHLORIDE 2 MG: 1 INJECTION, SOLUTION INTRAMUSCULAR; INTRAVENOUS at 12:12

## 2018-01-08 RX ADMIN — MIDAZOLAM HYDROCHLORIDE 2 MG: 1 INJECTION, SOLUTION INTRAMUSCULAR; INTRAVENOUS at 11:04

## 2018-01-08 RX ADMIN — FENTANYL CITRATE 50 MCG: 50 INJECTION, SOLUTION INTRAMUSCULAR; INTRAVENOUS at 12:55

## 2018-01-08 RX ADMIN — SODIUM CHLORIDE 300 ML/HR: 900 INJECTION, SOLUTION INTRAVENOUS at 15:24

## 2018-01-08 RX ADMIN — PANTOPRAZOLE SODIUM 40 MG: 40 TABLET, DELAYED RELEASE ORAL at 10:34

## 2018-01-08 NOTE — PERIOP NOTES
TRANSFER - OUT REPORT:    Verbal report given to CARLITOS Ramos RN(name) on Ruperto Cote  being transferred to [address] [place] [address], room 210(unit) for routine progression of care       Report consisted of patients Situation, Background, Assessment and   Recommendations(SBAR). Information from the following report(s) SBAR, OR Summary, Procedure Summary, Intake/Output and MAR was reviewed with the receiving nurse. Lines:   Peripheral IV 01/08/18 Left Hand (Active)   Site Assessment Clean, dry, & intact 1/8/2018  2:36 PM   Phlebitis Assessment 0 1/8/2018  2:36 PM   Infiltration Assessment 0 1/8/2018  2:36 PM   Dressing Status Clean, dry, & intact 1/8/2018  2:36 PM   Dressing Type Tape;Transparent 1/8/2018  2:36 PM   Hub Color/Line Status Green; Infusing;Patent 1/8/2018  2:36 PM        Opportunity for questions and clarification was provided.       Patient transported with:   O2 @ 2 liters  Registered Nurse  [facility]

## 2018-01-08 NOTE — DISCHARGE SUMMARY
[address] [address]     DISCHARGE SUMMARY     PATIENT: Louis Soler     MRN: [id number]   ADMIT DATE: 2018   BILLIN   DISCHARGE DATE:      ATTENDING: Jacques Stafford MD   DICTATING: TIMO Mckeon         ADMISSION DIAGNOSIS: OSTEOARTHRITIS RIGHT KNEE  Osteoarthritis of right knee    DISCHARGE DIAGNOSIS: Status post RIGHT TOTAL KNEE ARTHROPLASTY    HISTORY OF PRESENT ILLNESS: The patient is a 63y.o. year-old female   with ongoing right knee pain secondary to osteoarthritis of her right knee. The patient's pain has persisted and progressed despite conservative treatments and therapies. The patient has at this time opted for surgical intervention. PAST MEDICAL HISTORY:   Past Medical History:   Diagnosis Date    OA (osteoarthritis)     right knee    Osteoarthritis of right knee 2018       PAST SURGICAL HISTORY:   Past Surgical History:   Procedure Laterality Date    COLONOSCOPY N/A 10/9/2017    COLONOSCOPY performed by Shanique Gilmore MD at [address] HX ORTHOPAEDIC Left 2016    carpal tunnel release    HX TUBAL LIGATION         ALLERGIES:   Allergies   Allergen Reactions    Ultram [Tramadol] Itching     Nervousness, can't sleep        CURRENT MEDICATIONS:  A list of medications prior to the time of admission include:  Prior to Admission medications    Medication Sig Start Date End Date Taking? Authorizing Provider   aspirin delayed-release 81 mg tablet Take 1 Tab by mouth two (2) times a day for 21 days. 18 Yes TIMO Martinez   meloxicam (MOBIC) 7.5 mg tablet Take 1 Tab by mouth two (2) times a day for 14 days. 18 Yes TIMO Martinez   oxyCODONE-acetaminophen (PERCOCET) 5-325 mg per tablet Take 1-2 tablets by mouth every 4-6 hours as needed for pain.   Indications: Pain 18  Yes TIMO Martinez   fluticasone (FLONASE) 50 mcg/actuation nasal spray 2 Sprays by Both Nostrils route daily. Historical Provider   ciprofloxacin HCl (CIPRO) 100 mg tablet Take 100 mg by mouth two (2) times a day. Indications: UTI    Historical Provider       FAMILY HISTORY: History reviewed. No pertinent family history. SOCIAL HISTORY:   Social History     Social History    Marital status:      Spouse name: N/A    Number of children: N/A    Years of education: N/A     Social History Main Topics    Smoking status: Current Every Day Smoker     Packs/day: 0.25     Years: 10.00    Smokeless tobacco: Never Used      Comment: no smoking 24 hours prior to surgery    Alcohol use Yes      Comment: 2-3 beers per month    Drug use: No    Sexual activity: Not Asked     Other Topics Concern    None     Social History Narrative       REVIEW OF SYSTEMS: All review of systems are negative. PHYSICAL EXAMINATION: For a detailed physical exam, please refer to the patient's chart. HOSPITAL COURSE: The patient was taken to surgery the day of admission. she underwent a right total knee replacement. Operative course was benign. Estimated blood loss was approximately 150 cc. The patient was taken to the PACU in stable condition and was later taken to the floor in stable condition. During her hospital stay, the patient progressed well with physical therapy and occupational therapy, adherent to instructions. she had been cleared by physical therapy with stair training. she was placed on Aspirin for DVT prophylaxis. her pain has been well controlled with oral pain medications. her vitals have remained stable. she has also remained hemodynamically stable. The patient has been recommended for discharge home. DISCHARGE INSTRUCTIONS: The patient is to be discharged home. she is to continue on her prior medications per the medication reconciliation form, to which we will add:  1. Aspirin 81 mg; 1 tablet po bid x 21 days  2. Mobic 7.5 mg; 1 tablet po bid x 14 days  3.  Percocet 5/325 mg; 1-2 tablets p.o. every 4 to 6 hours p.r.n. for pain    The patient is to continue at home with home physical therapy 3 times a week to work on gait training, range of motion, strengthening, and weightbearing exercises as tolerated on her right lower extremity. The patient is to progress from a walker to a cane to complete total weightbearing as tolerable. The patient is to continue to keep her incision dry. The patient is to followup with Dr. Emmanuel Hess, Art Sauer PA-C and/or Brittany Lo PA-C in the office approximately 10-14 days status post for x-rays and further evaluation.     TIMO Law  1/8/2018

## 2018-01-08 NOTE — ROUTINE PROCESS
TRANSFER - IN REPORT:    Verbal report received from [address] [place] [address] on Louis Soler  being received from PACU for routine post - op. Report consisted of patients Situation, Background, Assessment and   Recommendations(SBAR). Information from the following report(s) SBAR, Kardex, OR Summary, Intake/Output and MAR was reviewed with the receiving nurse. Opportunity for questions and clarification was provided. Assessment to be completed upon patients arrival to unit and care assumed.

## 2018-01-08 NOTE — PERIOP NOTES
Family has been updated and given inpatient room #210. Receiving unit notified that SBAR is available for review.

## 2018-01-08 NOTE — ANESTHESIA PREPROCEDURE EVALUATION
Anesthetic History   No history of anesthetic complications            Review of Systems / Medical History  Patient summary reviewed, nursing notes reviewed and pertinent labs reviewed    Pulmonary          Smoker         Neuro/Psych   Within defined limits           Cardiovascular  Within defined limits                Exercise tolerance: >4 METS     GI/Hepatic/Renal  Within defined limits              Endo/Other        Arthritis     Other Findings              Physical Exam    Airway  Mallampati: III  TM Distance: 4 - 6 cm  Neck ROM: decreased range of motion   Mouth opening: Diminished (comment)     Cardiovascular  Regular rate and rhythm,  S1 and S2 normal,  no murmur, click, rub, or gallop  Rhythm: regular  Rate: normal         Dental         Pulmonary  Breath sounds clear to auscultation               Abdominal  GI exam deferred       Other Findings            Anesthetic Plan    ASA: 2  Anesthesia type: general      Post-op pain plan if not by surgeon: peripheral nerve block single    Induction: Intravenous  Anesthetic plan and risks discussed with: Patient and Family      Procedure and alternatives explained. Risks explained including bleeding, infection, nerve injury, failed block. Procedure explained as elective. Pt understands and desires to proceed.

## 2018-01-08 NOTE — IP AVS SNAPSHOT
[address] [address] 
[phone] Patient: Linda Krueger MRN: [id number] ZFF:[id number] About your hospitalization You were admitted on:  January 8, 2018 You last received care in the:  THE [facility] [address] You were discharged on:  January 9, 2018 Why you were hospitalized Your primary diagnosis was:  Osteoarthritis Of Right Knee Follow-up Information Follow up With Details Comments Contact Info Damaris Huerta MD On 1/24/2018 Follow up appointment @ 11:10am [address] [place] [address] Rd [address] [facility] 150 
[phone] [facility]   [facility] 150 
[phone] [address] to continue managing your healthcare needs. [phone] Discharge Orders None A check trey indicates which time of day the medication should be taken. My Medications START taking these medications Instructions Each Dose to Equal  
 Morning Noon Evening Bedtime  
 aspirin delayed-release 81 mg tablet Your last dose was: Your next dose is: Take 1 Tab by mouth two (2) times a day for 21 days. 81 mg  
    
   
   
   
  
 meloxicam 7.5 mg tablet Commonly known as:  MOBIC Your last dose was: Your next dose is: Take 1 Tab by mouth two (2) times a day for 14 days. 7.5 mg  
    
   
   
   
  
 oxyCODONE-acetaminophen 5-325 mg per tablet Commonly known as:  PERCOCET Your last dose was: Your next dose is: Take 1-2 tablets by mouth every 4-6 hours as needed for pain. Indications: Pain CONTINUE taking these medications Instructions Each Dose to Equal  
 Morning Noon Evening Bedtime  
 ciprofloxacin HCl 100 mg tablet Commonly known as:  CIPRO Your last dose was: Your next dose is: Take 100 mg by mouth two (2) times a day. Indications: UTI  
 100 mg FLONASE 50 mcg/actuation nasal spray Generic drug:  fluticasone Your last dose was: Your next dose is: 2 Sprays by Both Nostrils route daily. 2 [place] Where to Get Your Medications Information on where to get these meds will be given to you by the nurse or doctor. ! Ask your nurse or doctor about these medications  
  aspirin delayed-release 81 mg tablet  
 meloxicam 7.5 mg tablet  
 oxyCODONE-acetaminophen 5-325 mg per tablet Discharge Instructions [address] [place] [address] Patient Discharge Instructions Jose Singh / [id number] : 1955 Admitted 2018 Discharged: 2018 IF YOU HAVE ANY PROBLEMS ONCE YOU ARE AT HOME CALL THE FOLLOWING NUMBERS:  
Main office number: [phone] Your follow up appointment to see either Dr. Brenda Avendaño PA-C, or [facility] as scheduled in 2 weeks. If you are unsure of your appointment date call the office at [phone]. Medication Instructions · Resume your home medictions as directed, you may have directed not to resume supplements until after your follow up. · A prescription for pain medication has been given · It is important that you take the medication exactly as they are prescribed. · Keep your medication in the bottles provided by the pharmacist and keep a list of the medication names, dosages, and times to be taken in your wallet. · Do not take other medications without consulting your doctor. What to do at [facility] Resume your prehospital diet. If you have excessive nausea or vomitting call your doctor's office. Be sure to maintain adequate fluid intake. Some pain medications may cause constipation. Remember to drink fluids, stay as active as possible, and eat plenty of fiber-rich foods. Begin In-Home Physical Therapy; 3 times a week to work on gait training, range of motion, strengthening, and weight bearing exercises as tolerable. Continue to use your walker or cane when walking. May progress from the walker to a cane to complete total bearing as tolerable. Patient may shower. Wrap incision with plastic wrap/covering to prevent incision from getting wet. Avoid complete immersion. YOUR DRESSING SHOULD BE CHANGED IN 3-5 DAYS BY [facility] NURSE. When to Call - Call if you have a temperature greater then 101 
- Unable to keep food down - Are unable to bear any wieght  
- Need a pain medication refill Information obtained by : 
I understand that if any problems occur once I am at home I am to contact my physician. I understand and acknowledge receipt of the instructions indicated above. Physician's or R.N.'s Signature                                                                  Date/Time Patient or Representative Signature                                                          Date/Time [employer] Announcement We are excited to announce that we are making your provider's discharge notes available to you in [employer]. You will see these notes when they are completed and signed by the physician that discharged you from your recent hospital stay. If you have any questions or concerns about any information you see in [employer], please call the Health Information Department where you were seen or reach out to your Primary Care Provider for more information about your plan of care. Introducing [facility] SERVICES! Felecia Phelps introduces [employer] patient portal. Now you can access parts of your medical record, email your doctor's office, and request medication refills online. 1. In your internet browser, go to [email or web address] 2. Click on the First Time User? Click Here link in the Sign In box. You will see the New Member Sign Up page. 3. Enter your [employer] Access Code exactly as it appears below. You will not need to use this code after youve completed the sign-up process. If you do not sign up before the expiration date, you must request a new code. · [employer] Access Code: [email or web address] Expires: 4/2/2018 11:36 AM 
 
4. Enter the last four digits of your Social Security Number (xxxx) and Date of Birth (mm/dd/yyyy) as indicated and click Submit. You will be taken to the next sign-up page. 5. Create a [employer] ID. This will be your [employer] login ID and cannot be changed, so think of one that is secure and easy to remember. 6. Create a [employer] password. You can change your password at any time. 7. Enter your Password Reset Question and Answer. This can be used at a later time if you forget your password. 8. Enter your e-mail address. You will receive e-mail notification when new information is available in [address] 9. Click Sign Up. You can now view and download portions of your medical record. 10. Click the Download Summary menu link to download a portable copy of your medical information. If you have questions, please visit the Frequently Asked Questions section of the [employer] website. Remember, [employer] is NOT to be used for urgent needs. For medical emergencies, dial 911. Now available from your iPhone and Android! Providers Seen During Your Hospitalization Provider Specialty Primary office phone Bethany Perera MD Orthopedic Surgery [phone] Your Primary Care Physician (PCP) Primary Care Physician Office Phone Office Fax Perez Pradhan [phone] You are allergic to the following Allergen Reactions Ultram (Tramadol) Itching Nervousness, can't sleep Recent Documentation Height Weight BMI OB Status Smoking Status 1.6 m 95.9 kg 37.44 kg/m2 Postmenopausal Current Every Day Smoker Emergency Contacts Name Discharge Info Relation Home Work Mobile Quiñones,Zeferino DISCHARGE CAREGIVER [3] Son [22] [phone] [facility] DISCHARGE CAREGIVER [3] Sister [23]   [phone] Patient Belongings The following personal items are in your possession at time of discharge: 
  Dental Appliances: None  Visual Aid: None      Home Medications: None   Jewelry: None  Clothing: Pants, Shirt, Undergarments, Footwear, Socks, Jacket/Coat (with Cuong Schwalbe)    Other Valuables: Cell Phone, Purse (with Cuong Schwalbe) Please provide this summary of care documentation to your next provider. Signatures-by signing, you are acknowledging that this After Visit Summary has been reviewed with you and you have received a copy. Patient Signature:  ____________________________________________________________ Date:  ____________________________________________________________  
  
Nadeen Cerda Provider Signature:  ____________________________________________________________ Date:  ____________________________________________________________

## 2018-01-08 NOTE — PERIOP NOTES
Patient transferred to room 210, [facility], 2 south via bed. NC at 2 LPM. PIV with IVF infusing per orders. Blood pressure 136/85, pulse (!) 55, temperature 97.7 °F (36.5 °C), resp. rate 18, height 5' 3\" (1.6 m), weight 95.9 kg (211 lb 6 oz), SpO2 98 %. CARLITOS Ramos RN at bedside.

## 2018-01-08 NOTE — PROGRESS NOTES
Problem: Mobility Impaired (Adult and Pediatric)  Goal: *Acute Goals and Plan of Care (Insert Text)  Physical Therapy Goals  Initiated 1/8/2018  to be met within 1-2 days  STG's:  1. Bed mobility:  Supine to/from sit with S in prep for ADL activity. 2. Transfers:  Sit to/from stand with S in prep for gait. 3. Standing/Ambulation Balance:  Good with LRAD for safe transfers and gait. LTG's  1. Ambulation:  Ambulate 150 ft.with S with LRAD for home mobility at discharge. 2. Patient Education:  S/Independent with HEP for home performance accurately at discharge. 3. Step Negotiation: Ascend/Descend 3-5 steps with CGA with HR for home navigation as indicated. Outcome: Progressing Towards Goal  physical Therapy EVALUATION    Patient: Lona Naylor (89 y.o. female)  Date: 1/8/2018  Primary Diagnosis: OSTEOARTHRITIS RIGHT KNEE  Osteoarthritis of right knee  Procedure(s) (LRB):  RIGHT TOTAL KNEE REPLACEMENT (Right) Day of Surgery   Precautions: Fall, WBAT (R LE)    ASSESSMENT :  Based on the objective data described below, the patient is a 65yo F s/p T TKR who presents with decreased ROM/motor performance R LE, decreased independence in functional mobility with regard to bed mobility, transfers, gait, balance and decreased activity tolerance-fatigued following gt. Patient reports pain 2/10 pre/post session. Pt able to transition to sit EOB CGA, stand at RW with min/CGA/vc for safety/technique and progress to GT/RW/WBAT R LE with CGA/vc for safety/sequencting. Demonstrates slow, step to gt pattern, mildly antalgic. Pt returned to bed and left supine with ice pack to ant R Knee, rolled blanket along lateral side of LE for positioning and SCD''s in place. Nurse Lexy Selby notified. Recommend HHPT for follow up physical therapy upon discharge to reach maximal level of independence/safety with functional mobility.      Pt Education: pt educated in safety/technique during functional mobility tasks     Patient will benefit from skilled intervention to address the above impairments. Patients rehabilitation potential is considered to be Good  Factors which may influence rehabilitation potential include:   [x]         None noted  []         Mental ability/status  []         Medical condition  []         Home/family situation and support systems  []         Safety awareness  []         Pain tolerance/management  []         Other:      PLAN :  Recommendations and Planned Interventions:  [x]           Bed Mobility Training             []    Neuromuscular Re-Education  [x]           Transfer Training                   []    Orthotic/Prosthetic Training  [x]           Gait Training                          []    Modalities  [x]           Therapeutic Exercises          []    Edema Management/Control  [x]           Therapeutic Activities            [x]    Patient and Family Training/Education  []           Other (comment):    Frequency/Duration: Patient will be followed by physical therapy twice daily to address goals. Discharge Recommendations: Home Health  Further Equipment Recommendations for Discharge: rolling walker     SUBJECTIVE:   Patient stated I'm nervous.     OBJECTIVE DATA SUMMARY:     Past Medical History:   Diagnosis Date    OA (osteoarthritis)     right knee    Osteoarthritis of right knee 1/7/2018     Past Surgical History:   Procedure Laterality Date    COLONOSCOPY N/A 10/9/2017    COLONOSCOPY performed by Ezequiel Quick MD at [address] [place] [address] Left 06/30/2016    carpal tunnel release    HX TUBAL LIGATION       Barriers to Learning/Limitations: None  Compensate with: N/A  Prior Level of Function/Home Situation: amb with no device PTA  Home Situation  Home Environment: Apartment  # Steps to Enter: 0  One/Two Story Residence: One story  Living Alone: No  Support Systems: Family member(s)  Patient Expects to be Discharged to[de-identified] Apartment  Current DME Used/Available at Home: Crutches  Tub or Shower Type: Shower  Critical Behavior:  Neurologic State: Alert; Appropriate for age  Orientation Level: Oriented X4  Cognition: Follows commands  Psychosocial  Patient Behaviors: Anxious; Cooperative  Skin Condition/Temp: Warm  Skin Integrity: Incision (comment) (right knee)  Skin Integumentary  Skin Color: Appropriate for ethnicity  Skin Condition/Temp: Warm  Skin Integrity: Incision (comment) (right knee)  Strength:    Strength: Generally decreased, functional  Tone & Sensation:   Tone: Normal  Sensation: Impaired (R ant knee)  Range Of Motion:  AROM: Generally decreased, functional  Functional Mobility:  Bed Mobility:  Supine to Sit: Contact guard assistance  Sit to Supine: Contact guard assistance  Scooting: Supervision  Transfers:  Sit to Stand: Contact guard assistance;Minimum assistance; Other (comment) (vc for safety/hand placement)  Stand to Sit: Contact guard assistance; Other (comment) (as above)  Balance:   Sitting: Intact  Standing: Intact; With support  Ambulation/Gait Training:  Distance (ft): 40 Feet (ft)  Assistive Device: Gait belt;Walker, rolling  Ambulation - Level of Assistance: Contact guard assistance  Gait Description (WDL): Exceptions to WDL  Gait Abnormalities: Decreased step clearance; Step to gait  Right Side Weight Bearing: As tolerated  Speed/Violet: Slow  Step Length: Right shortened;Left shortened  Swing Pattern: Right asymmetrical;Left asymmetrical  Therapeutic Exercises:   Issued HEP per protocol  Pain:  Pain Scale 1: Numeric (0 - 10)  Pain Intensity 1: 4  Pain Location 1: Knee  Pain Orientation 1: Right  Pain Description 1: Aching  Pain Intervention(s) 1: Cold pack; Declines;Distraction  Activity Tolerance:   Good   Please refer to the flowsheet for vital signs taken during this treatment.   After treatment:   []         Patient left in no apparent distress sitting up in chair  [x]         Patient left in no apparent distress in bed  [x]         Call bell left within reach  [x]         Nursing notified-Monika  [x]         Caregiver present-sister  []         Bed alarm activated    COMMUNICATION/EDUCATION:   [x]         Fall prevention education was provided and the patient/caregiver indicated understanding. [x]         Patient/family have participated as able in goal setting and plan of care. [x]         Patient/family agree to work toward stated goals and plan of care. []         Patient understands intent and goals of therapy, but is neutral about his/her participation. []         Patient is unable to participate in goal setting and plan of care. Eval Complexity: History: MEDIUM  Complexity : 1-2 comorbidities / personal factors will impact the outcome/ POC Exam:LOW Complexity : 1-2 Standardized tests and measures addressing body structure, function, activity limitation and / or participation in recreation  Presentation: LOW Complexity : Stable, uncomplicated  Clinical Decision Making:Low Complexity amb >40ft Overall Complexity:LOW     G-Codes (GP)  Mobility  [id number] Current  CJ= 20-39%   Goal  CI= 1-19%  The severity rating is based on the functional mobility assessment.     Thank you for this referral.  Annalee Odonnell, PT   Time Calculation: 40 mins

## 2018-01-08 NOTE — OP NOTES
[address] [facility] [address] Medicine  Total Knee Arthroplasty    Patient: Bull Muhammad MRN: [id number]  SSN: [id number]    YOB: 1955  Age: 63 y.o. Sex: female      Date of Surgery: 1/8/2018   Preoperative Diagnosis: OSTEOARTHRITIS RIGHT KNEE   Postoperative Diagnosis: OSTEOARTHRITIS RIGHT KNEE   Location: [facility]  Surgeon: Juliano Jones MD  Assistant: Austin Lima PA-C    Anesthesia: General and Femoral Nerve Block    Procedure: Total Knee Arthroplasty: Depuy   The complexity of the total joint surgery requires the use of a first assistant for positioning, retraction and assistance in closure. Tourniquet Time: Tourniquet not used. Estimated Blood Loss: Less than 100cc     Implants:   Implant Name Type Inv. Item Serial No.  Lot No. LRB No. Used Action   CEMENT BNE FAST SET 20GM --  - [id number]  CEMENT BNE FAST SET 20GM --   [facility] [employer] [id number] Right 1 Implanted   PAT BHASKAR DOME MEDIAL 35MM -- ATTUNE - [id number]  PAT BHASKAR DOME MEDIAL 35MM -- ATTUNE  [facility] [employer] [id number] Right 1 Implanted   FEM PS SZ 4 RT BHASKAR -- ATTUNE - [id number]  FEM PS SZ 4 RT BHASKAR -- ATTUNE  [facility] [employer] [id number] Right 1 Implanted   TIBIAL BASE    [employer] [id number] Right 1 Implanted   TIBIAL INSERT       [employer] [id number] Right 1 Implanted        Specimens: None    Additional Findings: None     Body Mass Index: Body mass index is 37.44 kg/(m^2). Procedure Detail:  Prior to the surgery the patient was administered a femoral nerve block in the preoperative holding area by the anesthesiologist. Bull Muhammad was brought to the operating room and positioned on the operating table. She was anesthetized with anesthesia. Intravenous antibiotics were administered. Prior to the incision being made a timeout was called identifying the patient, procedure, operative side, and surgeon.  A pneumatic tourniquet was placed about the limb and the right leg was prepped and draped in the usual sterile manner. The tourniquet was not inflated throughout the case. A midline anterior incision made over the knee. The incision was carried down through the subcutaneous tissue to the underlying capsule. A medial parapatellar capsular incision was performed. The medial capsular flap was carefully elevated around to the posterior medial corner protecting the medial collateral ligaments and the fibers. The patella was sized with a caliper, and approximately 10-12 mm was resected with an oscillating saw allowing the patella to be slid into the lateral gutter. It was not everted throughout the case. Our attention was first turned to the distal femur and using intermedullary instrumentation, a 5-degree valgus cut was on the distal end of the femur. The distal end of the femur was sized to a size 4 femoral component. Pins were inserted through the sizer and the corresponding 4-in-1 block was slid into place and pinned for stability. Anterior and posterior and chamfer cuts were made to accommodate the femoral component. The medial and lateral menisci were excised as were the anterior cruciate ligaments. Our attention was then turned to the tibia. Using extramedullary instrumentation, a 3-degree cut was made on the proximal end of the tibia. A spacer block was placed to show gaps had been balanced and a size 4  tibial base plate was placed on the tibia and pinned into place. Intramedullary reaming guide was placed on the tibia and the appropriate reamer was used followed by the keel punch to complete the preparation of the tibia. A trial femoral component was then impacted on to the distal end of the femur. Trial reduction was then performed with incremental size trial bearing surfaces.  The Attune RP CR 5mm bearing surface matching the femur was inserted and allowed for full extension, good medial, lateral stability at 90 degrees of flexion, especially medially. Our attention was then turned to the patella. The patella was sized to a 35mm oval DePuy patella. The guide was pinned, placed over patella, 3 holes were drilled. Trial patella button was inserted and the patella was reduced in the knee. The patella tracked normally using no-touch technique. The trial components were then all removed. The real components were opened on the back. The cut surfaces of the bone were prepared using the PulsaVac lavage. Prior to this, the Aquamantys was used to cauterize any soft tissue bleeding. 20 grams of Depuy #2 cement was mixed. The femoral and tibial components were impacted in place and patellar component was cemented into place. Excess cement was removed from around the edge of bone using plastic curette. Once the cement had hardened, the knee was placed through range of motion and noted to be stable as mentioned above with the trail components. The wound was dry, therefore no drain was used. The operative knee was injected with 20 cc of exparel. The knee was then soaked with a diluted betadine solution for approximately 3 min. This was then thoroughly irrigated. The capsular layer was closed using a #2 stratafix suture with the knee flexed 90 degrees, while subcutaneous layers were closed using 2-0 Vicryl suture. Finally the skin was closed using Prineo, which were applied in occlusive fashion and sterile bandage applied. An Iceman cryotherapy pad was applied on the operative leg. Sponge count and needle counts were correct. She was taken to the recovery room extubated in stable condition.     Signed By: Rocio Weston MD     January 8, 2018

## 2018-01-08 NOTE — IP AVS SNAPSHOT
Gloria Jeans 
 
 
 [address] [place] [id number] 
[phone] Patient: Erin Monzon MRN: [id number] BEU:0/81/8829 A check trey indicates which time of day the medication should be taken. My Medications START taking these medications Instructions Each Dose to Equal  
 Morning Noon Evening Bedtime  
 aspirin delayed-release 81 mg tablet Your last dose was: Your next dose is: Take 1 Tab by mouth two (2) times a day for 21 days. 81 mg  
    
   
   
   
  
 meloxicam 7.5 mg tablet Commonly known as:  MOBIC Your last dose was: Your next dose is: Take 1 Tab by mouth two (2) times a day for 14 days. 7.5 mg  
    
   
   
   
  
 oxyCODONE-acetaminophen 5-325 mg per tablet Commonly known as:  PERCOCET Your last dose was: Your next dose is: Take 1-2 tablets by mouth every 4-6 hours as needed for pain. Indications: Pain CONTINUE taking these medications Instructions Each Dose to Equal  
 Morning Noon Evening Bedtime  
 ciprofloxacin HCl 100 mg tablet Commonly known as:  CIPRO Your last dose was: Your next dose is: Take 100 mg by mouth two (2) times a day. Indications: UTI  
 100 mg FLONASE 50 mcg/actuation nasal spray Generic drug:  fluticasone Your last dose was: Your next dose is: 2 Sprays by Both Nostrils route daily. 2 [place] Where to Get Your Medications Information on where to get these meds will be given to you by the nurse or doctor. ! Ask your nurse or doctor about these medications  
  aspirin delayed-release 81 mg tablet  
 meloxicam 7.5 mg tablet  
 oxyCODONE-acetaminophen 5-325 mg per tablet

## 2018-01-08 NOTE — ANESTHESIA POSTPROCEDURE EVALUATION
Post-Anesthesia Evaluation and Assessment    Cardiovascular Function/Vital Signs  Visit Vitals    /76    Pulse 65    Temp 36.4 °C (97.6 °F)    Resp 19    Ht 5' 3\" (1.6 m)    Wt 95.9 kg (211 lb 6 oz)    SpO2 100%    BMI 37.44 kg/m2       Patient is status post Procedure(s):  RIGHT TOTAL KNEE REPLACEMENT. Nausea/Vomiting: Controlled. Postoperative hydration reviewed and adequate. Pain:  Pain Scale 1: Numeric (0 - 10) (01/08/18 1445)  Pain Intensity 1: 4 (01/08/18 1445)   Managed. Neurological Status:   Neuro (WDL): Exceptions to WDL (01/08/18 1436)   At baseline. Mental Status and Level of Consciousness: Baseline and stable. Pulmonary Status:   O2 Device: Nasal cannula (01/08/18 1420)   Adequate oxygenation and airway patent. Complications related to anesthesia: None    Post-anesthesia assessment completed. No concerns. Patient has met all discharge requirements.     Signed By: Jalen Koenig MD

## 2018-01-08 NOTE — INTERVAL H&P NOTE
H&P Update:  Charity Henson was seen and examined. History and physical has been reviewed. The patient has been examined.  There have been no significant clinical changes since the completion of the originally dated History and Physical.    Signed By: Tevin Dean MD     January 8, 2018 11:42 AM

## 2018-01-08 NOTE — PERIOP NOTES
TRANSFER - IN REPORT:    Verbal report received from ROBBIN Sharif and PANCHITO Summers RN(name) on [employer]  being received from OR(unit) for routine post - op      Report consisted of patients Situation, Background, Assessment and   Recommendations(SBAR). Information from the following report(s) SBAR, OR Summary, Procedure Summary, Intake/Output and MAR was reviewed with the receiving nurse. Opportunity for questions and clarification was provided. Assessment completed upon patients arrival to unit and care assumed.

## 2018-01-08 NOTE — ANESTHESIA PROCEDURE NOTES
Peripheral Block    Start time: 1/8/2018 11:03 AM  End time: 1/8/2018 11:10 AM  Performed by: Becky Donato  Authorized by: Becky Donato       Pre-procedure: Indications: at surgeon's request and post-op pain management    Preanesthetic Checklist: patient identified, risks and benefits discussed, site marked, timeout performed, anesthesia consent given and patient being monitored    Timeout Time: 11:03          Block Type:   Block Type: Adductor canal  Laterality:  Right  Monitoring:  Standard ASA monitoring, responsive to questions, continuous pulse ox, oxygen, frequent vital sign checks and heart rate  Injection Technique:  Single shot  Procedures: ultrasound guided    Patient Position: supine  Prep: chlorhexidine    Location:  Mid thigh  Needle Type:  Stimuplex  Needle Gauge:  21 G  Needle Localization:  Ultrasound guidance  Medication Injected:  0.5%  ropivacaine  Volume (mL):  20  Add'l Medication Injected:  1.5%  mepivacaine  Volume (mL):  10    Assessment:  Number of attempts:  1  Injection Assessment:  Incremental injection every 5 mL, negative aspiration for CSF, no paresthesia, ultrasound image on chart, local visualized surrounding nerve on ultrasound, negative aspiration for blood and no intravascular symptoms  Patient tolerance:  Patient tolerated the procedure well with no immediate complications  Patient able to straight leg raise  left leg after block. No sensory or motor block.

## 2018-01-08 NOTE — PROGRESS NOTES
1501 - Patient arrives to unit at this time. Admission completed at this time. Patient is A/O x 4. IV to left hand intact and patent. ZEHRA to left leg and plexis applied alyx bilateral feet. ACE to right knee CDI. Patient denies numbness/tingling. Pedal pulses palpable. Pain 5/10. Patient was oriented to the room to include use of call bell, meal ordering, and use of incentive spirometer patient able to get up to 1500 on IS. Patient was given explanation of \" up for dinner\" program and has verbalized understanding. Phone and call bell left within reach. Plan of care for the day addressed with patient. Educated on pain medication availability and possible side effects.

## 2018-01-09 ENCOUNTER — HOME HEALTH ADMISSION (OUTPATIENT)
Dept: HOME HEALTH SERVICES | Facility: HOME HEALTH | Age: 63
End: 2018-01-09
Payer: COMMERCIAL

## 2018-01-09 VITALS
SYSTOLIC BLOOD PRESSURE: 110 MMHG | OXYGEN SATURATION: 100 % | BODY MASS INDEX: 37.45 KG/M2 | WEIGHT: 211.38 LBS | TEMPERATURE: 98.2 F | DIASTOLIC BLOOD PRESSURE: 56 MMHG | HEIGHT: 63 IN | HEART RATE: 66 BPM | RESPIRATION RATE: 17 BRPM

## 2018-01-09 LAB
ANION GAP SERPL CALC-SCNC: 9 MMOL/L (ref 3–18)
BUN SERPL-MCNC: 12 MG/DL (ref 7–18)
BUN/CREAT SERPL: 12 (ref 12–20)
CALCIUM SERPL-MCNC: 9.2 MG/DL (ref 8.5–10.1)
CHLORIDE SERPL-SCNC: 108 MMOL/L (ref 100–108)
CO2 SERPL-SCNC: 24 MMOL/L (ref 21–32)
CREAT SERPL-MCNC: 1 MG/DL (ref 0.6–1.3)
ERYTHROCYTE [DISTWIDTH] IN BLOOD BY AUTOMATED COUNT: 14.8 % (ref 11.6–14.5)
GLUCOSE BLD STRIP.AUTO-MCNC: 110 MG/DL (ref 70–110)
GLUCOSE SERPL-MCNC: 156 MG/DL (ref 74–99)
HCT VFR BLD AUTO: 33.4 % (ref 35–45)
HGB BLD-MCNC: 11.2 G/DL (ref 12–16)
MCH RBC QN AUTO: 29 PG (ref 24–34)
MCHC RBC AUTO-ENTMCNC: 33.5 G/DL (ref 31–37)
MCV RBC AUTO: 86.5 FL (ref 74–97)
PLATELET # BLD AUTO: 323 K/UL (ref 135–420)
PMV BLD AUTO: 9.3 FL (ref 9.2–11.8)
POTASSIUM SERPL-SCNC: 4.1 MMOL/L (ref 3.5–5.5)
RBC # BLD AUTO: 3.86 M/UL (ref 4.2–5.3)
SODIUM SERPL-SCNC: 141 MMOL/L (ref 136–145)
WBC # BLD AUTO: 8.4 K/UL (ref 4.6–13.2)

## 2018-01-09 PROCEDURE — 97535 SELF CARE MNGMENT TRAINING: CPT

## 2018-01-09 PROCEDURE — 80048 BASIC METABOLIC PNL TOTAL CA: CPT | Performed by: PHYSICIAN ASSISTANT

## 2018-01-09 PROCEDURE — 74011250636 HC RX REV CODE- 250/636: Performed by: PHYSICIAN ASSISTANT

## 2018-01-09 PROCEDURE — 97116 GAIT TRAINING THERAPY: CPT

## 2018-01-09 PROCEDURE — 74011250637 HC RX REV CODE- 250/637: Performed by: PHYSICIAN ASSISTANT

## 2018-01-09 PROCEDURE — 77030012893

## 2018-01-09 PROCEDURE — 77030011256 HC DRSG MEPILEX <16IN NO BORD MOLN -A

## 2018-01-09 PROCEDURE — 36415 COLL VENOUS BLD VENIPUNCTURE: CPT | Performed by: PHYSICIAN ASSISTANT

## 2018-01-09 PROCEDURE — 82962 GLUCOSE BLOOD TEST: CPT

## 2018-01-09 PROCEDURE — 97165 OT EVAL LOW COMPLEX 30 MIN: CPT

## 2018-01-09 PROCEDURE — 97110 THERAPEUTIC EXERCISES: CPT

## 2018-01-09 PROCEDURE — 85027 COMPLETE CBC AUTOMATED: CPT | Performed by: PHYSICIAN ASSISTANT

## 2018-01-09 RX ADMIN — KETOROLAC TROMETHAMINE 15 MG: 15 INJECTION, SOLUTION INTRAMUSCULAR; INTRAVENOUS at 05:07

## 2018-01-09 RX ADMIN — OXYCODONE HYDROCHLORIDE 5 MG: 5 TABLET ORAL at 00:53

## 2018-01-09 RX ADMIN — KETOROLAC TROMETHAMINE 15 MG: 15 INJECTION, SOLUTION INTRAMUSCULAR; INTRAVENOUS at 13:04

## 2018-01-09 RX ADMIN — ASPIRIN 81 MG: 81 TABLET, COATED ORAL at 09:01

## 2018-01-09 RX ADMIN — ACETAMINOPHEN 650 MG: 325 TABLET ORAL at 13:04

## 2018-01-09 RX ADMIN — OXYCODONE HYDROCHLORIDE 10 MG: 5 TABLET ORAL at 09:01

## 2018-01-09 RX ADMIN — MELOXICAM 7.5 MG: 7.5 TABLET ORAL at 09:01

## 2018-01-09 RX ADMIN — Medication 5 ML: at 13:12

## 2018-01-09 RX ADMIN — FERROUS SULFATE TAB 325 MG (65 MG ELEMENTAL FE) 325 MG: 325 (65 FE) TAB at 09:01

## 2018-01-09 RX ADMIN — OXYCODONE HYDROCHLORIDE 10 MG: 5 TABLET ORAL at 13:04

## 2018-01-09 RX ADMIN — CEFAZOLIN SODIUM 2 G: 2 SOLUTION INTRAVENOUS at 05:06

## 2018-01-09 RX ADMIN — DOCUSATE SODIUM 100 MG: 100 CAPSULE, LIQUID FILLED ORAL at 09:01

## 2018-01-09 RX ADMIN — ACETAMINOPHEN 650 MG: 325 TABLET ORAL at 05:06

## 2018-01-09 RX ADMIN — KETOROLAC TROMETHAMINE 15 MG: 15 INJECTION, SOLUTION INTRAMUSCULAR; INTRAVENOUS at 00:53

## 2018-01-09 RX ADMIN — ACETAMINOPHEN 650 MG: 325 TABLET ORAL at 00:53

## 2018-01-09 RX ADMIN — PANTOPRAZOLE SODIUM 40 MG: 40 TABLET, DELAYED RELEASE ORAL at 09:01

## 2018-01-09 NOTE — PROGRESS NOTES
Problem: Mobility Impaired (Adult and Pediatric)  Goal: *Acute Goals and Plan of Care (Insert Text)  Physical Therapy Goals  Initiated 1/8/2018  to be met within 1-2 days  STG's:  1. Bed mobility:  Supine to/from sit with S in prep for ADL activity. 2. Transfers:  Sit to/from stand with S in prep for gait. 3. Standing/Ambulation Balance:  Good with LRAD for safe transfers and gait. LTG's  1. Ambulation:  Ambulate 150 ft.with S with LRAD for home mobility at discharge. 2. Patient Education:  S/Independent with HEP for home performance accurately at discharge. 3. Step Negotiation: Ascend/Descend 3-5 steps with CGA with HR for home navigation as indicated. Outcome: Progressing Towards Goal  physical Therapy TREATMENT    Patient: Maria Del Rosario Hess (78 y.o. female)  Date: 1/9/2018  Diagnosis: OSTEOARTHRITIS RIGHT KNEE  Osteoarthritis of right knee Osteoarthritis of right knee  Procedure(s) (LRB):  RIGHT TOTAL KNEE REPLACEMENT (Right) 1 Day Post-Op  Precautions: Fall, WBAT (R LE)   Chart, physical therapy assessment, plan of care and goals were reviewed. ASSESSMENT:  Pt found supine in bed on PT arrival and eager to participate with therapy session. Pt met goal regarding bed mobility, transfers and gt with RW/S/150ft. Uses slow eben with step to gt. Verbal cues for posture correction and safe turn negotiation. Pt returned to room and voided in bathroom with supervision/vc for safety. Pt educated in HEP and completed same per protocol. Pt left seated in chair at bedside awaiting lunch. Will continue in pm for stair instruction. Progression toward goals:  [x]      Improving appropriately and progressing toward goals  []      Improving slowly and progressing toward goals  []      Not making progress toward goals and plan of care will be adjusted     PLAN:  Patient continues to benefit from skilled intervention to address the above impairments. Continue treatment per established plan of care.   Discharge Recommendations:  Home Health  Further Equipment Recommendations for Discharge:  rolling walker     SUBJECTIVE:   Patient stated I'm ready now.     OBJECTIVE DATA SUMMARY:   Critical Behavior:  Neurologic State: Alert, Appropriate for age  Orientation Level: Oriented X4  Cognition: Appropriate decision making  Functional Mobility Training:  Bed Mobility:  Rolling: Modified independent  Supine to Sit: Supervision  Scooting: Supervision  Transfers:  Sit to Stand: Supervision  Stand to Sit: Supervision  Bed to Chair: Supervision  Interventions: Safety awareness training;Verbal cues  Balance:  Sitting: Intact  Standing: Intact; With support  Ambulation/Gait Training:  Distance (ft): 150 Feet (ft)  Assistive Device: Gait belt;Walker, rolling  Ambulation - Level of Assistance: Supervision  Gait Abnormalities: Decreased step clearance; Step to gait  Right Side Weight Bearing: As tolerated  Speed/Violet: Pace decreased (<100 feet/min)  Step Length: Right shortened;Left shortened  Swing Pattern: Right asymmetrical;Left asymmetrical  Interventions: Safety awareness training;Verbal cues (vc for posture correction and safety duirng turn nego)  Therapeutic Exercises:   Seated: marching x 10, LAQ [id number] hold, AP x 20  Pain:  Pain Scale 1: Numeric (0 - 10)  Pain Intensity 1: 8  Pain Location 1: Knee  Pain Orientation 1: Right  Pain Intervention(s) 1: Medication (see MAR)  Activity Tolerance:   Good   Please refer to the flowsheet for vital signs taken during this treatment.   After treatment:   [x] Patient left in no apparent distress sitting up in chair awaiting dinner  [] Patient left in no apparent distress in bed  [x] Call bell left within reach  [x] Nursing notified  [] Caregiver present  [] Bed alarm activated      Sheryle Alliance, PT   Time Calculation: 23 mins

## 2018-01-09 NOTE — PROGRESS NOTES
200 - Received report from Karishma Larkin RN(offgoing nurse). Assumed care of PT. PT assessed. PT A&Ox4. PT is safe in BED. Neuro WDL. Respiratory WDL. Wound right knee(location) with ace wrap(dressing). Bed in low. Bedrails x3. Pain is 2(number) out of 10. PT oriented to room & given instructions regarding call bell, incentive spirometer, room phone, medications, and pain medications with potential side effects. PT encouraged to use call bell. Phone & call bell left in reach of PT.      0053 - PT stated pain 5/10, requested pain medication. Roxicodone 5mg administered in response. Will continue to monitor patient.

## 2018-01-09 NOTE — PROGRESS NOTES
Problem: Falls - Risk of  Goal: *Absence of Falls  Document Veronica Fall Risk and appropriate interventions in the flowsheet.    Outcome: Progressing Towards Goal  Fall Risk Interventions:  Mobility Interventions: Utilize walker, cane, or other assitive device, Patient to call before getting OOB, PT Consult for mobility concerns         Medication Interventions: Patient to call before getting OOB, Teach patient to arise slowly    Elimination Interventions: Call light in reach, Patient to call for help with toileting needs, Toilet paper/wipes in reach, Toileting schedule/hourly rounds

## 2018-01-09 NOTE — PROGRESS NOTES
Patient was visited by [facility]. Volunteer conducted a Spiritual Care Screening and reported no needs to this . Spiritual Care literature was provided. Chaplains will continue to follow and will provide pastoral care as needed or requested. [address], M.Div.   Board Certified   [phone] - Office

## 2018-01-09 NOTE — ROUTINE PROCESS
1950- Bedside and Verbal shift change report given to [facility] RN by Gely Shipley RN. Report included the following information SBAR, Kardex, OR Summary, Intake/Output and MAR.

## 2018-01-09 NOTE — PROGRESS NOTES
Progress Note        Patient: Juliet Magana MRN: [id number]  SSN: [id number]    YOB: 1955  Age: 63 y.o. Sex: female      1 Day Post-Op status post Procedure(s) (LRB):  RIGHT TOTAL KNEE REPLACEMENT (Right)    Admit Date: 2018  Admit Diagnosis: OSTEOARTHRITIS RIGHT KNEE  Osteoarthritis of right knee    Subjective:      Doing well. No complaints. No SOB. No Chest Pain. No Nausea or Vomiting. No problems eating or voiding. Objective:        Temp (24hrs), Av.8 °F (36.6 °C), Min:97.1 °F (36.2 °C), Max:98.7 °F (37.1 °C)    Body mass index is 37.44 kg/(m^2). Patient Vitals for the past 12 hrs:   BP Temp Pulse Resp SpO2   18 0400 103/47 98.7 °F (37.1 °C) 60 16 100 %   18 2332 96/52 97.9 °F (36.6 °C) (!) 54 16 100 %     Recent Labs      18   0248   HGB  11.2*   HCT  33.4*   NA  141   K  4.1   CL  108   CO2  24   BUN  12   CREA  1.00   GLU  156*       Physical Exam:  Vital Signs are Stable. No Acute Distress. Alert and Oriented. Negative Homans sign. Toes AROM Full. Neurovascular exam is normal.    Dressing is Clean, Dry, and Intact. Assessment/Plan:     1. Continue oob/rehab  2.  D/c planning    Continue PT/OT  Continue CPM/ROM    Discharge Plan: Home    Signed By: Lori Lott MD     2018

## 2018-01-09 NOTE — PROGRESS NOTES
Problem: Mobility Impaired (Adult and Pediatric)  Goal: *Acute Goals and Plan of Care (Insert Text)  Physical Therapy Goals  Initiated 1/8/2018  to be met within 1-2 days  STG's:  1. Bed mobility:  Supine to/from sit with S in prep for ADL activity. 2. Transfers:  Sit to/from stand with S in prep for gait. 3. Standing/Ambulation Balance:  Good with LRAD for safe transfers and gait. LTG's  1. Ambulation:  Ambulate 150 ft.with S with LRAD for home mobility at discharge. 2. Patient Education:  S/Independent with HEP for home performance accurately at discharge. 3. Step Negotiation: Ascend/Descend 3-5 steps with CGA with HR for home navigation as indicated. PT treatment attempted. Pt working with OT at this time. Will f/u at later time.  Hari Hernandez, PT

## 2018-01-09 NOTE — DISCHARGE INSTRUCTIONS
[address] [place] [address] Sports Medicine   Patient Discharge Instructions    Radha Wilburn / [id number] : 1955    Admitted 2018 Discharged: 2018     IF YOU HAVE ANY PROBLEMS ONCE YOU ARE AT HOME CALL THE FOLLOWING NUMBERS:   Main office number: [phone]    Your follow up appointment to see either Dr. Terrance Thomas PA-C, or Mychal Roy PA-C as scheduled in 2 weeks. If you are unsure of your appointment date call the office at [phone]. Medication Instructions     · Resume your home medictions as directed, you may have directed not to resume supplements until after your follow up. · A prescription for pain medication has been given   · It is important that you take the medication exactly as they are prescribed. · Keep your medication in the bottles provided by the pharmacist and keep a list of the medication names, dosages, and times to be taken in your wallet. · Do not take other medications without consulting your doctor. What to do at [address] [place] [address] Ave your prehospital diet. If you have excessive nausea or vomitting call your doctor's office. Be sure to maintain adequate fluid intake. Some pain medications may cause constipation. Remember to drink fluids, stay as active as possible, and eat plenty of fiber-rich foods. Begin In-Home Physical Therapy; 3 times a week to work on gait training, range of motion, strengthening, and weight bearing exercises as tolerable. Continue to use your walker or cane when walking. May progress from the walker to a cane to complete total bearing as tolerable. Patient may shower. Wrap incision with plastic wrap/covering to prevent incision from getting wet. Avoid complete immersion. YOUR DRESSING SHOULD BE CHANGED IN 3-5 DAYS BY [facility] NURSE.       When to Call    - Call if you have a temperature greater then 101  - Unable to keep food down  - Are unable to bear any wieght   - Need a pain medication refill     Information obtained by :  I understand that if any problems occur once I am at home I am to contact my physician. I understand and acknowledge receipt of the instructions indicated above.                                                                                                                                            Physician's or R.N.'s Signature                                                                  Date/Time                                                                                                                                              Patient or Representative Signature                                                          Date/Time

## 2018-01-09 NOTE — PROGRESS NOTES
Problem: Self Care Deficits Care Plan (Adult)  Goal: *Acute Goals and Plan of Care (Insert Text)  Occupational Therapy    In 1-2 visits, (01/09/18)    1. Patient will complete LB dressing with min A for increased independence with ADLs  2. Patient will perform all aspects of toileting with S for increased independence with ADLs  3. Patient will complete grooming tasks standing at sink with set up/S for increased independence with ADLs      Outcome: Resolved/Met Date Met: 01/09/18  Occupational Therapy EVALUATION/discharge    Patient: Lona Naylor (88 y.o. female)  Date: 1/9/2018  Primary Diagnosis: OSTEOARTHRITIS RIGHT KNEE  Osteoarthritis of right knee  Procedure(s) (LRB):  RIGHT TOTAL KNEE REPLACEMENT (Right) 1 Day Post-Op   Precautions:  Fall, WBAT (R LE)    ASSESSMENT AND RECOMMENDATIONS:  Based on the objective data described below, the patient presents with ability to perform ADL tasks at setup/S level following R TKA. Pt is able to perform functional transfers, including on/off toilet with S/Mod I. Pt tolerated standing ~3-4 minutes at sink to perform grooming tasks with setup/S and no LOB. Discussed home safety and DME and pt verbalized understanding. RW has been delivered for pt use at home. Pt rates R knee pain 2 out of 10 pre/post OT session. Ice pack replaced to R knee at end of session. Pt with no further OT/ADL concerns at this time. [facility]. Discharge Recommendations: Home Health  Further Equipment Recommendations for Discharge: N/A      SUBJECTIVE:   Patient stated I'll go ahead and get up.     OBJECTIVE DATA SUMMARY:     Past Medical History:   Diagnosis Date    OA (osteoarthritis)     right knee    Osteoarthritis of right knee 1/7/2018     Past Surgical History:   Procedure Laterality Date    COLONOSCOPY N/A 10/9/2017    COLONOSCOPY performed by Marla Mcghee MD at [address] HX ORTHOPAEDIC Left 06/30/2016    carpal tunnel release  HX TUBAL LIGATION       Barriers to Learning/Limitations: None  Compensate with: visual, verbal, tactile, kinesthetic cues/model    Prior Level of Function/Home Situation:   Home Situation  Home Environment: Apartment  # Steps to Enter: 0  One/Two Story Residence: One story  Living Alone: No  Support Systems: Family member(s)  Patient Expects to be Discharged to[de-identified] Apartment  Current DME Used/Available at Home: Crutches  Tub or Shower Type: Tub/Shower combination  [x]     Right hand dominant   []     Left hand dominant    Cognitive/Behavioral Status:  Neurologic State: Alert; Appropriate for age  Orientation Level: Oriented X4  Cognition: Appropriate decision making     Skin: ace wrap/dressing intact R LE  Edema: R LE  Coordination:  Coordination: Within functional limits  Balance:  Sitting: Intact  Standing: Intact; With support  Strength:  Strength: Generally decreased, functional B UE  Tone & Sensation:  Tone: Normal  Range of Motion:  AROM: Generally decreased, functional B UE  Functional Mobility and Transfers for ADLs:  Bed Mobility:  Rolling: Modified independent  Supine to Sit: Modified independent;Supervision     Scooting: Supervision  Transfers:  Sit to Stand: Modified independent;Supervision     Bed to Chair: Supervision          Toilet Transfer : Modified independent;Supervision        ADL Assessment:    Oral Facial Hygiene/Grooming: Supervision;Setup    Bathing: Supervision;Setup    Upper Body Dressing: Setup    Lower Body Dressing: Supervision;Setup    Toileting: Supervision       ADL Intervention:  Reviewed adaptive techniques to facilitate Ransom with LB dressing and pt verbalized/demonstrated understanding.       Grooming  Brushing Teeth: Supervision/set-up standing at sink     Upper Body Dressing Assistance  Dressing Assistance: Supervision/set-up    Lower Body Dressing Assistance  Dressing Assistance: Supervision/set up  Underpants: Supervision/set-up  Pants With Elastic Waist: Supervision/set-up    Toileting  Toileting Assistance: Supervision/set up  Activity Tolerance:   Pt tolerated OT session well. Please refer to the flowsheet for vital signs taken during this treatment. After treatment:   [x]  Patient left in no apparent distress sitting up in chair  []  Patient left in no apparent distress in bed  [x]  Call bell left within reach  []  Nursing notified  []  Caregiver present  []  Bed alarm activated    COMMUNICATION/EDUCATION:   Communication/Collaboration:  [x]      Home safety education was provided and the patient/caregiver indicated understanding. [x]      Patient/family have participated as able and agree with findings and recommendations. []      Patient is unable to participate in plan of care at this time. Thank you for this referral.  Constance Grover, OTR/L  Time Calculation: 20 mins    G-Codes (GP)  Self Care   Current  CI= 1-19%   Goal  CI= 1-19%   D/C  CI= 1-19%  The severity rating is based on the professional judgement & direct observation of Level of Assistance required for Functional Mobility and ADLs. Eval Complexity: History: LOW Complexity : Brief history review ; Examination: LOW Complexity : 1-3 performance deficits relating to physical, cognitive , or psychosocial skils that result in activity limitations and / or participation restrictions ;    Decision Making:LOW Complexity : No comorbidities that affect functional and no verbal or physical assistance needed to complete eval tasks

## 2018-01-09 NOTE — PROGRESS NOTES
Problem: Mobility Impaired (Adult and Pediatric)  Goal: *Acute Goals and Plan of Care (Insert Text)  Physical Therapy Goals  Initiated 1/8/2018  to be met within 1-2 days  STG's:  1. Bed mobility:  Supine to/from sit with S in prep for ADL activity. 2. Transfers:  Sit to/from stand with S in prep for gait. 3. Standing/Ambulation Balance:  Good with LRAD for safe transfers and gait. LTG's  1. Ambulation:  Ambulate 150 ft.with S with LRAD for home mobility at discharge. 2. Patient Education:  S/Independent with HEP for home performance accurately at discharge. 3. Step Negotiation: Ascend/Descend 3-5 steps with CGA with HR for home navigation as indicated. Outcome: Resolved/Met Date Met: 01/09/18  physical Therapy TREATMENT/DISCHARGE    Patient: Marsha Medley (24 y.o. female)  Date: 1/9/2018  Diagnosis: OSTEOARTHRITIS RIGHT KNEE  Osteoarthritis of right knee Osteoarthritis of right knee  Procedure(s) (LRB):  RIGHT TOTAL KNEE REPLACEMENT (Right) 1 Day Post-Op  Precautions: Fall, WBAT (R LE)  Chart, physical therapy assessment, plan of care and goals were reviewed. ASSESSMENT:  Pt demonstrates knowledge of and accurate performance of HEP per protocol, completed GT/ with supervision using slow, antalgic, step to gt pattern and demonstrated stair negotiation with bilateral HR's and CGA. Pt returned to room and left supine with SCD's on, ice pack to ant knee R and all needs in reach. Pt sister present. Pt has met PT goals and is ready to progress to next level of care at this time. Nurse Cuello notified. Progression toward goals:  [x]      Goals met  []      Improving appropriately and progressing toward goals  []      Improving slowly and progressing toward goals  []      Not making progress toward goals and plan of care will be adjusted     PLAN:  Patient will be discharged from physical therapy at this time.   Rationale for discharge:  [x] Goals Achieved  [] Plateau Reached  [] Patient not participating in therapy  [] Other:  Discharge Recommendations:  Home Health  Further Equipment Recommendations for Discharge:  N/A     SUBJECTIVE:   Patient stated I hope I can do it.     OBJECTIVE DATA SUMMARY:   Critical Behavior:  Neurologic State: Alert, Appropriate for age  Orientation Level: Oriented X4  Cognition: Appropriate decision making  Functional Mobility Training:  Bed Mobility:  Rolling: Modified independent  Supine to Sit: Modified independent  Sit to Supine: Modified independent  Scooting: Modified independent  Level of Assistance: Modified independent  Transfers:  Sit to Stand: Modified independent;Supervision  Stand to Sit: Modified independent;Supervision  Bed to Chair: Supervision  Interventions: Safety awareness training;Verbal cues  Balance:  Sitting: Intact  Standing: Intact; With support  Ambulation/Gait Training:  Distance (ft): 150 Feet (ft)  Assistive Device: Gait belt;Walker, rolling  Ambulation - Level of Assistance: Supervision  Gait Abnormalities: Antalgic;Decreased step clearance; Step to gait  Right Side Weight Bearing: As tolerated  Speed/Violet: Pace decreased (<100 feet/min)  Step Length: Left shortened;Right shortened  Swing Pattern: Left asymmetrical;Right asymmetrical  Interventions: Safety awareness training;Verbal cues  Stairs:  Number of Stairs Trained: 3  Stairs - Level of Assistance: Contact guard assistance   Rail Use: Both  Therapeutic Exercises:       EXERCISE   Sets   Reps   Active Active Assist   Passive Self ROM   Comments   Ankle Pumps/AC 1 20  [] [] [] []    Quad Sets/Glut Sets 1 10 [] [] [] []    Hamstring Sets   [] [] [] []    Short Arc Quads   [] [] [] []    Heel Slides 1 10 [] [] [] []    Straight Leg Raises   [] [] [] []    Hip Abd/Add   [] [] [] []    Long Arc Quads 1 10 [] [] [] []    Seated Marching   [] [] [] []    Standing Marching   [] [] [] []       [] [] [] []      Pain:  Pain Scale 1: Numeric (0 - 10)  Pain Intensity 1: 2  Pain Location 1: Knee  Pain Orientation 1: Right  Pain Description 1: Constant; Sore  Pain Intervention(s) 1: Medication (see MAR)  Activity Tolerance:   Good   Please refer to the flowsheet for vital signs taken during this treatment.   After treatment:   [] Patient left in no apparent distress sitting up in chair  [x] Patient left in no apparent distress in bed  [x] Call bell left within reach  [x] Nursing notified-Khushboo  [x] Caregiver present-sister  [] Bed alarm activated  Edwin Wallace PT   Time Calculation: 32 mins

## 2018-01-09 NOTE — PROGRESS NOTES
Chart reviewed, met with pt at bedside. Pt awaiting PT, plans discharge home with daughter and other family available to assist. [address] offered and pt chose [facility] [phone] for follow up; referral placed with CMS and liaison aware. RW to be delivered by [facility] today. Care Management Interventions  PCP Verified by CM:  Yes  Transition of Care Consult (CM Consult): [address]: Yes  Discharge Durable Medical Equipment: Yes  Physical Therapy Consult: Yes  Occupational Therapy Consult: Yes  Current Support Network: Own Home, Family Lives Nearby  Confirm Follow Up Transport: Family  Plan discussed with Pt/Family/Caregiver: Yes  Freedom of Choice Offered: Yes  Discharge Location  Discharge Placement: Home with home health

## 2018-01-09 NOTE — ROUTINE PROCESS
Bedside and Verbal shift change report given to Steffanie Marie RN (oncoming nurse) by Grace Velasquez RN (offgoing nurse). Report included the following information SBAR, Kardex, Procedure Summary, Intake/Output, MAR, Accordion, Recent Results and Med Rec Status.

## 2018-01-10 ENCOUNTER — HOME CARE VISIT (OUTPATIENT)
Dept: SCHEDULING | Facility: HOME HEALTH | Age: 63
End: 2018-01-10
Payer: COMMERCIAL

## 2018-01-10 ENCOUNTER — PATIENT OUTREACH (OUTPATIENT)
Dept: OTHER | Age: 63
End: 2018-01-10

## 2018-01-10 VITALS
SYSTOLIC BLOOD PRESSURE: 121 MMHG | DIASTOLIC BLOOD PRESSURE: 76 MMHG | HEART RATE: 83 BPM | OXYGEN SATURATION: 98 % | TEMPERATURE: 97.5 F | RESPIRATION RATE: 14 BRPM

## 2018-01-10 PROCEDURE — G0299 HHS/HOSPICE OF RN EA 15 MIN: HCPCS

## 2018-01-10 PROCEDURE — 400013 HH SOC

## 2018-01-10 PROCEDURE — G0151 HHCP-SERV OF PT,EA 15 MIN: HCPCS

## 2018-01-10 NOTE — PROGRESS NOTES
Transition Of Care Note    Patient discharged from [facility] on  for R total knee replacement. Medical History:     Past Medical History:   Diagnosis Date    OA (osteoarthritis)     right knee    Osteoarthritis of right knee 2018       Care Manager contacted the patient by telephone to perform post hospital discharge assessment. Verified  and zip code with patient as identifiers. Provided introduction to self, and explanation of the Nurse Care Manager role. Medication:   Performed medication reconciliation with patient, and patient verbalizes understanding of administration of home medications. There were no barriers to obtaining medications identified at this time. Support system:  patient    Discharge Instructions :  Reviewed discharge instructions with patient. Patient verbalizes understanding of discharge instructions and follow-up care. Red Flags:  - Call if you have a temperature greater then 101  - Unable to keep food down  - Are unable to bear any wieght   - Need a pain medication refill  -Excessive nausea or vomiting    Advance Care Planning:   Patient was offered the opportunity to discuss advance care planning:  no     Does patient have an Advance Directive:  no   If no, did you provide information on Caring Connections?  no     PCP/Specialist follow up: Patient scheduled to follow up with Dr. Adrianna Ahumada on ., and [facility] PT at 11am-12pm.  Pt reports pain is 10/10, states it has been painful since sleeping, with being twisted and was swollen and tight upon awakening. Taking percocet Q 4hrs. This CM reviewed red flags, and encouraged pt to contact this CM or Dr. Bree Oconnor office if pain remains uncontrolled or swelling does not recede. Pt did verbalize she was getting dressed prior to call, and \"moving a lot. \" Shannon Gómez ordered for pt is too narrow, this CM contacted number provided by pt for Whitney and ryne GASTON, replayed this to patient and again, reviewed red flags with patient, and patient verbalizes understanding. The patient agrees to contact the surgeon's office for questions related to their healthcare. The patient expressed thanks, offered no additional questions and ended the call.

## 2018-01-12 ENCOUNTER — HOME CARE VISIT (OUTPATIENT)
Dept: HOME HEALTH SERVICES | Facility: HOME HEALTH | Age: 63
End: 2018-01-12
Payer: COMMERCIAL

## 2018-01-12 ENCOUNTER — HOME CARE VISIT (OUTPATIENT)
Dept: SCHEDULING | Facility: HOME HEALTH | Age: 63
End: 2018-01-12
Payer: COMMERCIAL

## 2018-01-12 PROCEDURE — G0157 HHC PT ASSISTANT EA 15: HCPCS

## 2018-01-13 ENCOUNTER — HOME CARE VISIT (OUTPATIENT)
Dept: SCHEDULING | Facility: HOME HEALTH | Age: 63
End: 2018-01-13
Payer: COMMERCIAL

## 2018-01-13 PROCEDURE — G0299 HHS/HOSPICE OF RN EA 15 MIN: HCPCS

## 2018-01-15 ENCOUNTER — HOME CARE VISIT (OUTPATIENT)
Dept: SCHEDULING | Facility: HOME HEALTH | Age: 63
End: 2018-01-15
Payer: COMMERCIAL

## 2018-01-15 VITALS
RESPIRATION RATE: 16 BRPM | HEART RATE: 100 BPM | DIASTOLIC BLOOD PRESSURE: 80 MMHG | OXYGEN SATURATION: 97 % | TEMPERATURE: 97 F | SYSTOLIC BLOOD PRESSURE: 120 MMHG

## 2018-01-15 PROCEDURE — G0157 HHC PT ASSISTANT EA 15: HCPCS

## 2018-01-17 ENCOUNTER — PATIENT OUTREACH (OUTPATIENT)
Dept: OTHER | Age: 63
End: 2018-01-17

## 2018-01-17 ENCOUNTER — HOME CARE VISIT (OUTPATIENT)
Dept: SCHEDULING | Facility: HOME HEALTH | Age: 63
End: 2018-01-17
Payer: COMMERCIAL

## 2018-01-17 PROCEDURE — G0157 HHC PT ASSISTANT EA 15: HCPCS

## 2018-01-17 NOTE — PROGRESS NOTES
Follow-up call to pt, two pt identifiers verified. Pt reports swelling and pain has decreased but still present. Reports pain as an 8-3/10 depending on activity. Using support sock, but not stocking as it caused some increased swelling as it rolled on her thigh. Using incentive spirometer, has some congestion, more pronounced at night, but not present at all times. Denied fever, chills or productive cough. This CM recommended if having non-emergent symptoms to contact 24/7 for virtual health visit. Has f/u appt on 1/24. Continues with PT, states she is doing well with therapy and support. This CM will f/u in 2 weeks.

## 2018-01-19 ENCOUNTER — HOME CARE VISIT (OUTPATIENT)
Dept: SCHEDULING | Facility: HOME HEALTH | Age: 63
End: 2018-01-19
Payer: COMMERCIAL

## 2018-01-19 PROCEDURE — G0299 HHS/HOSPICE OF RN EA 15 MIN: HCPCS

## 2018-01-19 PROCEDURE — G0157 HHC PT ASSISTANT EA 15: HCPCS

## 2018-01-20 VITALS
SYSTOLIC BLOOD PRESSURE: 120 MMHG | DIASTOLIC BLOOD PRESSURE: 78 MMHG | OXYGEN SATURATION: 99 % | HEART RATE: 74 BPM | RESPIRATION RATE: 16 BRPM

## 2018-01-24 ENCOUNTER — HOME CARE VISIT (OUTPATIENT)
Dept: SCHEDULING | Facility: HOME HEALTH | Age: 63
End: 2018-01-24
Payer: COMMERCIAL

## 2018-01-24 PROCEDURE — G0157 HHC PT ASSISTANT EA 15: HCPCS

## 2018-01-26 ENCOUNTER — HOME CARE VISIT (OUTPATIENT)
Dept: HOME HEALTH SERVICES | Facility: HOME HEALTH | Age: 63
End: 2018-01-26
Payer: COMMERCIAL

## 2018-01-26 ENCOUNTER — HOME CARE VISIT (OUTPATIENT)
Dept: SCHEDULING | Facility: HOME HEALTH | Age: 63
End: 2018-01-26
Payer: COMMERCIAL

## 2018-01-26 VITALS
OXYGEN SATURATION: 97 % | HEART RATE: 110 BPM | SYSTOLIC BLOOD PRESSURE: 118 MMHG | DIASTOLIC BLOOD PRESSURE: 80 MMHG | RESPIRATION RATE: 16 BRPM

## 2018-01-26 PROCEDURE — G0299 HHS/HOSPICE OF RN EA 15 MIN: HCPCS

## 2018-01-26 PROCEDURE — G0157 HHC PT ASSISTANT EA 15: HCPCS

## 2018-01-29 NOTE — PROGRESS NOTES
0900  Assumed care of pt at this time. Assessment complete. Pt alert and oriented x 4. Denies SOB and chest pain. Pt lungs clear bilaterally. Cap refill  less than 3 seconds. Pt denies numbness and tingling to all extremities. Stated pain 0/10 when resting but ambulating is 6/10. Juliet Ishmael Pt has 18 G IV to L hand. Pt has ACE dressing to RLE CDI . Plexis bilaterally and TEDs to LLE. Pt encouraged to continue use of IS. Pt verbalized understanding. Ice pack applied. Call light and possessions within reach. Bed in low position. Will continue to monitor. 0901  Pt stated pain is 4/10. Medicated with 10mg of oxycodone     1305  Pt stated pain is 8/10. Medicated with 10mg of oxycodone     1457  Ambulating in hallway with PT    1532  Dual AVS reviewed with ZOE Callejas RN. All medications reviewed individually with patient. Opportunities for questions and concerns provided. Patient to be discharged via (mode of transport ie. Car, ambulance or air transport) car. Patient's arm band appropriately discarded. IV removed  ACE bandage removed. Mepilex CDI. No s/s of infection to site. Moderate swelling no noted bruising.  ZEHRA hose applied Received a prescription from [facility] for a refill.    Med: Hydrocortisone Valerate Cream  Dosage: 0.2%, 180gm  Sig:  Apply 1 gram up to 2 times to affected are(s)  Quantity requested:  90

## 2018-01-30 ENCOUNTER — HOSPITAL ENCOUNTER (OUTPATIENT)
Dept: PHYSICAL THERAPY | Age: 63
Discharge: HOME | End: 2018-01-30
Payer: COMMERCIAL

## 2018-01-30 PROCEDURE — 97110 THERAPEUTIC EXERCISES: CPT | Performed by: PHYSICAL THERAPIST

## 2018-01-30 PROCEDURE — 97161 PT EVAL LOW COMPLEX 20 MIN: CPT | Performed by: PHYSICAL THERAPIST

## 2018-01-30 NOTE — PROGRESS NOTES
PT DAILY TREATMENT NOTE/KNEE EVAL 3-16    Patient Name: Edin Coreas  Date:2018  : 1955  [x]  Patient  Verified  Payor: Romina Boucher / Plan: Kanwal Rice / Product Type: PPO /    In time:3:00  Out time:4:00  Total Treatment Time (min): 60  Total Timed Codes (min): 30  1:1 Treatment Time ( only): 60   Visit #: 1 of 12    Treatment Area: right knee pain    SUBJECTIVE  Pain Level (0-10 scale): 4  []constant []intermittent []improving []worsening []no change since onset    Any medication changes, allergies to medications, adverse drug reactions, diagnosis change, or new procedure performed?: [x] No    [] Yes (see summary sheet for update)  Subjective functional status/changes:     Patient has CC of R TKA  for 2017. DELMI: TKA. 1 day in hospital, had HHPT. No complications during that time. Patient describes pain as aching, throbbing, constant. Pain is worse in AM. Reports numbness/tingling into R lateral shin, and thigh. Denies popping/clicking. Aggravating factors:stairs, sit<>stand, walking WB. Alleviating factors: rest, ice,. Reports minimal lightheadedness, SOB, abdominal pain. Denies red flags: SOB, chest pain, dizziness/lightheadedness, blurred/double vision, HA, chills/fevers, night sweats, change in bowel/bladder control, abdominal pain, difficulty swallowing, slurred speech, unexplained weight gain/loss. PMHx: pancreatic CA maternal, sister with breast CA, both . Surgical Hx: R plantar fascia surgery and heel/bone spur removal. Social Hx: 1 level home, alcohol, tobacco: 1 cigarette/day, work status: environmental services . PLOF: sedentary, independent w/ADLs. CLOF: difficulty with sit<>stand.   Diagnostic Imaging: NA    OBJECTIVE/EXAMINATION    30 min [x]Eval                  []Re-Eval       30 min Therapeutic Exercise:  [x] See flow sheet :   Rationale: increase ROM, increase strength and decrease pain to improve the patients ability to complete ADLs With   [] TE   [] TA   [] neuro   [] other: Patient Education: [x] Review HEP    [] Progressed/Changed HEP based on:   [] positioning   [] body mechanics   [] transfers   [] heat/ice application    [] other:        Physical Therapy Evaluation - Knee  Observation: Incision appears to be healing well. No redness, drainage, streaking noted. Posture: [] Varus    [] Valgus    [] Recurvatum        [] Tibial Torsion    [] Foot Supination    [] Foot Pronation    Describe:    Gait:  [] Normal    [x] Abnormal    [x] Antalgic    [] NWB    Device: SPC    Describe:flex knee    ROM / Strength  [] Unable to assess                  AROM                  Strength (1-5)    Left Right Left Right   Hip Flexion   4+ 4-    Extension   4 4    Abduction   4 4    Adduction       Knee Flexion 120 100 5 4+    Extension 0 20 5 4   Ankle Plantarflexion   4 4    Dorsiflexion   5 5       Palpation:  Numbness on the lateral aspect of the incision. TTP medial to incision    Optional Tests:  Patellar tracking WNL    Other tests/comments:       Pain Level (0-10 scale) post treatment: 4    ASSESSMENT/Changes in Function: Patient presents s/p R TKA on 1/8/2018. Patient will continue to benefit from skilled PT services to modify and progress therapeutic interventions, address functional mobility deficits, address ROM deficits, address strength deficits, analyze and address soft tissue restrictions, analyze and cue movement patterns, analyze and modify body mechanics/ergonomics and assess and modify postural abnormalities to attain remaining goals.      [x]  See Plan of Care  []  See progress note/recertification  []  See Discharge Summary         Progress towards goals / Updated goals:  See POC    PLAN  []  Upgrade activities as tolerated     [x]  Continue plan of care  []  Update interventions per flow sheet       []  Discharge due to:_  []  Other:_      Teto Mathur PT, DPT 1/30/2018  2:48 PM

## 2018-01-30 NOTE — PROGRESS NOTES
[facility]  [address], [address] [facility] [place]  Ph [phone]  Fx [phone]    Plan of Care/ Statement of Necessity for Physical Therapy Services    Patient name: Kong Cedillo Start of Care: 2018   Referral source: Poli Lane MD : 1955    Medical Diagnosis: right knee pain   Onset Date:2018    Treatment Diagnosis: R TKA   Prior Hospitalization: see medical history Provider#: [id number]   Medications: Verified on Patient summary List    Comorbidities: OA, tobacco use   Prior Level of Function: independent w/ ADLs, sedentary      The Plan of Care and following information is based on the information from the initial evaluation. Assessment/ key information: Patient presents s/p R TKA on 2018. Patient will continue to benefit from skilled PT services to modify and progress therapeutic interventions, address functional mobility deficits, address ROM deficits, address strength deficits, analyze and address soft tissue restrictions, analyze and cue movement patterns, analyze and modify body mechanics/ergonomics and assess and modify postural abnormalities to attain remaining goals.     Evaluation Complexity History MEDIUM  Complexity : 1-2 comorbidities / personal factors will impact the outcome/ POC ; Examination MEDIUM Complexity : 3 Standardized tests and measures addressing body structure, function, activity limitation and / or participation in recreation  ;Presentation LOW Complexity : Stable, uncomplicated  ;Clinical Decision Making MEDIUM Complexity : FOTO score of 26-74  Overall Complexity Rating: LOW   Problem List: pain affecting function, decrease ROM, decrease strength, edema affecting function, impaired gait/ balance, decrease ADL/ functional abilitiies, decrease activity tolerance, decrease flexibility/ joint mobility and decrease transfer abilities   Treatment Plan may include any combination of the following: Therapeutic exercise, Therapeutic activities, Neuromuscular re-education, Physical agent/modality, Gait/balance training, Manual therapy, Aquatic therapy, Patient education, Self Care training, Functional mobility training, Home safety training and Stair training  Patient / Family readiness to learn indicated by: asking questions, trying to perform skills and interest  Persons(s) to be included in education: patient (P)  Barriers to Learning/Limitations: None  Patient Goal (s): no pain, mobility  Patient Self Reported Health Status: good  Rehabilitation Potential: good    Short Term Goals: To be accomplished in 2 weeks:   Patient will report compliance with HEP at least 1x/day to aid in rehabilitation program.   Status at IE: NA   Current:     Patient will demonstrate AROM of  degrees to aid in completion of ADLs. Status at IE:    Current:       Long Term Goals: To be accomplished in 6 weeks:   Patient will increase strength to 5/5 throughout B LEs to aid in return to sports. Status at IE:4/5   Current:     Patient will report pain less than 1-2/10 average to aid in completion of ADLs. Status at IE:8 at worst   Current:     Patient will perform 10 squats with 20lbs with good form/technique to aid in completion of ADLs and return to work   Status at IE: Pain with sit<>stand, uses UEs   Current:     Patient will improve FOTO to 55 points overall to demonstrate improvement in functional ability. Status at IE:33   Current:       Frequency / Duration: Patient to be seen 2 times per week for 6 weeks. Patient/ Caregiver education and instruction: Diagnosis, prognosis, self care, activity modification and exercises   [x]  Plan of care has been reviewed with LISA Anna PT, DPT 1/30/2018 4:31 PM    ________________________________________________________________________    I certify that the above Therapy Services are being furnished while the patient is under my care.  I agree with the treatment plan and certify that this therapy is necessary.     Physician's Signature:____________________  Date:____________Time: _________    Please sign and return to [facility] [address] Garibay, [address]  Ph [phone]  Fx [phone]

## 2018-01-31 ENCOUNTER — PATIENT OUTREACH (OUTPATIENT)
Dept: OTHER | Age: 63
End: 2018-01-31

## 2018-01-31 NOTE — PROGRESS NOTES
Follow-up call to pt, two pt identifiers verified. Patient began outpatient PT yesterday at [facility], stated PT answered a lot of questions. Reports that \"I feel sketchy getting around, like I don't trust myself. \" Has family support for tasks that require extensive walking such as the grocery store. Pain is \"lessening up\" did get refill of percocet and new prescription for meloxicam at follow-up appt on 1/24. Denies any other concerns or questions at this time. Will continue to follow until end of MAIRA episode.

## 2018-02-02 ENCOUNTER — HOSPITAL ENCOUNTER (OUTPATIENT)
Dept: PHYSICAL THERAPY | Age: 63
Discharge: HOME | End: 2018-02-02
Payer: COMMERCIAL

## 2018-02-02 PROCEDURE — 97032 APPL MODALITY 1+ESTIM EA 15: CPT | Performed by: PHYSICAL THERAPIST

## 2018-02-02 PROCEDURE — 97110 THERAPEUTIC EXERCISES: CPT | Performed by: PHYSICAL THERAPIST

## 2018-02-02 NOTE — PROGRESS NOTES
PT DAILY TREATMENT NOTE - [facility]     Patient Name: Charity Henson  Date:2018  : 1955  [x]  Patient  Verified  Payor: Karri Holden / Plan: Ean Germain / Product Type: PPO /    In BZFW:1062  Out time:1040  Total Treatment Time (min): 65  Total Timed Codes (min): 65  1:1 Treatment Time ( only): 48  Visit #: 2 of 12    Treatment Area: right knee pain    SUBJECTIVE  Pain Level (0-10 scale): 1  Any medication changes, allergies to medications, adverse drug reactions, diagnosis change, or new procedure performed?: [x] No    [] Yes (see summary sheet for update)  Subjective functional status/changes:   [] No changes reported  Pain at most 5/10 usually once stiff then tries to move , pt taking pain meds only as needed, pt icing and doing HEP as tolerated     OBJECTIVE    Modality rationale: decrease edema, decrease inflammation, decrease pain and increase tissue extensibility to improve the patients ability to    Min Type Additional Details    [] Estim:  []Unatt       []IFC  []Premod                        []Other:  []w/ice   []w/heat  Position:  Location:   20 [x] Estim: [x]Att    []TENS instruct  []NMES                    []Other:  []w/US   [x]w/ice last 10 min   []w/heat  Position:reclined propped LE   Location:right knee     []  Traction: [] Cervical       []Lumbar                       [] Prone          []Supine                       []Intermittent   []Continuous Lbs:  [] before manual  [] after manual    []  Ultrasound: []Continuous   [] Pulsed                           []1MHz   []3MHz W/cm2:  Location:    []  Iontophoresis with dexamethasone         Location: [] Take home patch   [] In clinic   12 [x]  Ice     []  heat  []  Ice massage  []  Laser   []  Anodyne Position:supine legs propped on wedge   Location:right knee     []  Laser with stim  []  Other:  Position:  Location:    []  Vasopneumatic Device Pressure:       [] lo [] med [] hi   Temperature: [] lo [] med [] hi   [] Skin assessment post-treatment:  []intact []redness- no adverse reaction    []redness - adverse reaction:     45 min Therapeutic Exercise:  [x] See flow sheet :   Rationale: increase ROM, increase strength and decrease pain to improve the patients ability to complete ADLs          With   [] TE   [] TA   [] neuro   [] other: Patient Education: [x] Review HEP    [] Progressed/Changed HEP based on:   [] positioning   [] body mechanics   [] transfers   [] heat/ice application    [] other:      Other Objective/Functional Measures: flexion 100 deg , 103 PROM but painful , lacking 5-9 deg of extension    Scar mobility + sensitive to pressure and mobility , mild tight mid 1/2 of scar but scar is intact no seepage , no red spots     Pain Level (0-10 scale) post treatment: 5    ASSESSMENT/Changes in Function: pt c/o nerve shooting pain at knee , difficult to do supine quad set tolerated sitting side of bed with leg extended to floor for quad set , pt tolerated SAQ with E-stim for  Pain , and ice at end helped calm pain down     Patient will continue to benefit from skilled PT services to modify and progress therapeutic interventions, address functional mobility deficits, address ROM deficits, address strength deficits, analyze and address soft tissue restrictions, analyze and cue movement patterns, analyze and modify body mechanics/ergonomics, assess and modify postural abnormalities and address imbalance/dizziness to attain remaining goals.      [x]  See Plan of Care  []  See progress note/recertification  []  See Discharge Summary         Progress towards goals / Updated goals:  Short Term Goals: To be accomplished in 2 weeks:                        Patient will report compliance with HEP at least 1x/day to aid in rehabilitation program.                        Status at IE: NA                        Current:given handout of ex this session                            Patient will demonstrate AROM of  degrees to aid in completion of ADLs. Status at IE:                         Current:5-10 deg from full extension , 100 AROM flexion +2-3 for overpressure but painful          Long Term Goals: To be accomplished in 6 weeks:                        Patient will increase strength to 5/5 throughout B LEs to aid in return to sports. Status at IE:4/5                        Current:nt                            Patient will report pain less than 1-2/10 average to aid in completion of ADLs. Status at IE:8 at worst                        Current:pt reports at most usually on ly 5/10 but up to 7 /10 during ex this session calmed down with ice                            Patient will perform 10 squats with 20lbs with good form/technique to aid in completion of ADLs and return to work                        Status at IE: Pain with sit<>stand, uses UEs                        Current:nt                            Patient will improve FOTO to 55 points overall to demonstrate improvement in functional ability.                         Status at IE:33                        Current:reasesses at 5th visit   PLAN  [x]  Upgrade activities as tolerated     [x]  Continue plan of care  []  Update interventions per flow sheet       []  Discharge due to:_  []  Other:_      Brian Mock, PT 2/2/2018  9:37 AM    Future Appointments  Date Time Provider Angel Lopez   2/5/2018 9:00 AM Brian Mock, [facility]   2/7/2018 9:00 AM [facility]   2/12/2018 9:00 AM [facility]   2/14/2018 9:00 AM [facility]   2/19/2018 9:00 AM [facility]   2/21/2018 9:00 AM [facility]   2/26/2018 9:00 AM [facility]   2/27/2018 8:30 AM Hilton Montgomery PT [facility]

## 2018-02-05 ENCOUNTER — HOSPITAL ENCOUNTER (OUTPATIENT)
Dept: PHYSICAL THERAPY | Age: 63
Discharge: HOME | End: 2018-02-05
Payer: COMMERCIAL

## 2018-02-05 PROCEDURE — 97014 ELECTRIC STIMULATION THERAPY: CPT | Performed by: PHYSICAL THERAPIST

## 2018-02-05 PROCEDURE — 97110 THERAPEUTIC EXERCISES: CPT | Performed by: PHYSICAL THERAPIST

## 2018-02-05 NOTE — PROGRESS NOTES
PT DAILY TREATMENT NOTE - [facility]     Patient Name: Jennifer Cutler  Date:2018  : 1955  [x]  Patient  Verified  Payor: Connor Boggs / Plan: Karin Johnson / Product Type: PPO /    In time:1702  Out time:1800  Total Treatment Time (min): 58  Total Timed Codes (min): 38  Visit #: 3 of 12    Treatment Area: right knee pain    SUBJECTIVE  Pain Level (0-10 scale): 5  Any medication changes, allergies to medications, adverse drug reactions, diagnosis change, or new procedure performed?: [x] No    [] Yes (see summary sheet for update)  Subjective functional status/changes:   [] No changes reported  Pt reports attempting ex at home but having a lot of pain , broke out in sweat this morning trying to do ex , denies fever   Pt has a TENS unit using post ex at home , also using ice     OBJECTIVE    Modality rationale: decrease pain to improve the patients ability to progress with ex improving ROM and functional mobility    Min Type Additional Details   20 [x] Estim:  []Unatt       [x]IFC  []Premod                        []Other:  [x]w/ice last 10 min   []w/heat  Position:supine propped on wedge then on bolster   Location:    [] Estim: []Att    []TENS instruct  []NMES                    []Other:  []w/US   []w/ice   []w/heat  Position:  Location:    []  Traction: [] Cervical       []Lumbar                       [] Prone          []Supine                       []Intermittent   []Continuous Lbs:  [] before manual  [] after manual    []  Ultrasound: []Continuous   [] Pulsed                           []1MHz   []3MHz W/cm2:  Location:    []  Iontophoresis with dexamethasone         Location: [] Take home patch   [] In clinic    []  Ice     []  heat  []  Ice massage  []  Laser   []  Anodyne Position:  Location:    []  Laser with stim  []  Other:  Position:  Location:    []  Vasopneumatic Device Pressure:       [] lo [] med [] hi   Temperature: [] lo [] med [] hi   [] Skin assessment post-treatment: []intact []redness- no adverse reaction    []redness - adverse reaction:      38 min Therapeutic Exercise:  [x] See flow sheet :   Rationale: increase ROM, increase strength and decrease pain to improve the patients ability to complete ADLs          With   [] TE   [] TA   [] neuro   [] other: Patient Education: [x] Review HEP    [] Progressed/Changed HEP based on:   [] positioning   [] body mechanics   [] transfers   [] heat/ice application    [] other:      Other Objective/Functional Measures: see ex grid    scar intact , no seepage , mild warmth over anterior knee but no hot spots  , swelling appears controlled  Pt limps heavily due to pain c/o , using cane   Pain Level (0-10 scale) post treatment: 5     ASSESSMENT/Changes in Function:  , pt having a lot of pain with movement flexion and extension , limiting tolerance to exercises , advised if pain persists at this intensity or worsen to f/u with Dr also advised if run a fever or notes hot spots to contact Dr.      Patient will continue to benefit from skilled PT services to modify and progress therapeutic interventions, address functional mobility deficits, address ROM deficits, address strength deficits, analyze and address soft tissue restrictions, analyze and cue movement patterns, analyze and modify body mechanics/ergonomics, assess and modify postural abnormalities and address imbalance/dizziness to attain remaining goals. [x]  See Plan of Care  []  See progress note/recertification  []  See Discharge Summary         Progress towards goals / Updated goals:  Short Term Goals: To be accomplished in 2 weeks:                        DWACAKP will report compliance with HEP at least 1x/day to aid in rehabilitation program.                        Status at IE: NA                        Current:given handout of ex this session                             Patient will demonstrate AROM of  degrees to aid in completion of ADLs.                         OMSITQ at IE:                         Current:5-10 deg from full extension , 100 AROM flexion +2-3 for overpressure but painful           Long Term Goals: To be accomplished in 6 weeks:                        Patient will increase strength to 5/5 throughout B LEs to aid in return to sports.                        Status at IE:4/5                        Current:not tolerating resist testing                              Patient will report pain less than 1-2/10 average to aid in completion of ADLs.                       JKKIIF at IE:8 at worst                        Current:pt reports at most usually on ly 5/10 but up to 7 /10 during ex this session calmed down with ice                             Patient will perform 10 squats with 20lbs with good form/technique to aid in completion of ADLs and return to work                        Status at IE: Pain with sit<>stand, uses UEs                        Current:nt                             Patient will improve FOTO to 55 points overall to demonstrate improvement in functional ability.                         AQWSSY at IE:33                        Current:reasesses at 5th visit     PLAN  [x]  Upgrade activities as tolerated     [x]  Continue plan of care  []  Update interventions per flow sheet       []  Discharge due to:_  []  Other:_      Chandni Melgar PT 2/5/2018  5:45 PM    Future Appointments  Date Time Provider Angel Lopez   2/7/2018 9:00 AM [facility]   2/12/2018 9:00 AM [facility]   2/14/2018 9:00 AM [facility]   2/19/2018 9:00 AM [facility]   2/21/2018 9:00 AM [facility]   2/26/2018 9:00 AM [facility]   2/27/2018 8:30 AM Akin Jha PT [facility]

## 2018-02-07 ENCOUNTER — HOSPITAL ENCOUNTER (OUTPATIENT)
Dept: PHYSICAL THERAPY | Age: 63
Discharge: HOME | End: 2018-02-07
Payer: COMMERCIAL

## 2018-02-07 PROCEDURE — 97140 MANUAL THERAPY 1/> REGIONS: CPT

## 2018-02-07 PROCEDURE — 97110 THERAPEUTIC EXERCISES: CPT

## 2018-02-07 NOTE — PROGRESS NOTES
PT DAILY TREATMENT NOTE     Patient Name: Ruperto Cote  Date:2018  : 1955  [x]  Patient  Verified  Payor: Cash Torres / Plan: Jahaira Murdock / Product Type: PPO /    In time:9:02  Out time:10:03  Total Treatment Time (min): 61  Visit #: 4 of 12    Treatment Area: right knee pain    SUBJECTIVE  Pain Level (0-10 scale): 1  Any medication changes, allergies to medications, adverse drug reactions, diagnosis change, or new procedure performed?: [x] No    [] Yes (see summary sheet for update)  Subjective functional status/changes:   [] No changes reported  \"I have been doing the bridges at home. \"    OBJECTIVE    Modality rationale: decrease edema, decrease inflammation and decrease pain to improve the patients ability to perform daily activities with decreased pain and symptom levels     Min Type Additional Details    [] Estim:  []Unatt       []IFC  []Premod                        []Other:  []w/ice   []w/heat  Position:  Location:    [] Estim: []Att    []TENS instruct  []NMES                    []Other:  []w/US   []w/ice   []w/heat  Position:  Location:    []  Traction: [] Cervical       []Lumbar                       [] Prone          []Supine                       []Intermittent   []Continuous Lbs:  [] before manual  [] after manual    []  Ultrasound: []Continuous   [] Pulsed                           []1MHz   []3MHz W/cm2:  Location:    []  Iontophoresis with dexamethasone         Location: [] Take home patch   [] In clinic   10 [x]  Ice     []  heat  []  Ice massage  []  Laser   []  Anodyne Position: supine  Location:right knee     []  Laser with stim  []  Other:  Position:  Location:    []  Vasopneumatic Device Pressure:       [] lo [] med [] hi   Temperature: [] lo [] med [] hi   [x] Skin assessment post-treatment:  [x]intact []redness- no adverse reaction    []redness - adverse reaction:       36 min Therapeutic Exercise:  [x] See flow sheet :   Rationale: increase ROM, increase strength and improve coordination to improve the patients ability to perform daily activities with decreased pain and symptom levels    15 min Manual Therapy:  PROM right knee with overpressure into flexion, gentle tibiofemoral mobs into extension, patella mobs with knee extension and flexion    Rationale: decrease pain, increase ROM and increase tissue extensibility to perform daily activities with decreased pain and symptom levels          With   [] TE   [] TA   [] neuro   [] other: Patient Education: [x] Review HEP    [] Progressed/Changed HEP based on:   [] positioning   [] body mechanics   [] transfers   [] heat/ice application    [] other:      Other Objective/Functional Measures:   7-110* Right knee AROM  Right SL balance up to 12 seconds today    Pain Level (0-10 scale) post treatment: 1    ASSESSMENT/Changes in Function: Pain and guarding with PROM however AROM is improving reaching 110* today. Tolerated new exercises well with no reports of increased pain. Updated HEP with education on continued compliance. Patient will continue to benefit from skilled PT services to modify and progress therapeutic interventions, address functional mobility deficits, address ROM deficits, address strength deficits, analyze and cue movement patterns and instruct in home and community integration to attain remaining goals. []  See Plan of Care  []  See progress note/recertification  []  See Discharge Summary         Progress towards goals / Updated goals:  Short Term Goals: To be accomplished in 2 weeks:                        PCQQINU will report compliance with HEP at least 1x/day to aid in rehabilitation program.                        Status at IE: NA                        Current:given handout of ex this session. Compliance with bridges, progressing                             Patient will demonstrate AROM of  degrees to aid in completion of ADLs.                         YMGGUA at IE:                        Current: 7-110* AROM progressing           Long Term Goals: To be accomplished in 6 weeks:                        Patient will increase strength to 5/5 throughout B LEs to aid in return to sports.                        Status at IE:4/5                        Current:not tolerating resist testing                              Patient will report pain less than 1-2/10 average to aid in completion of ADLs.                       YAEBEW at IE:8 at worst                        Current:pt reports at most usually on ly 5/10 but up to 7 /10 during ex this session calmed down with ice                             Patient will perform 10 squats with 20lbs with good form/technique to aid in completion of ADLs and return to work                        Status at IE: Pain with sit<>stand, uses UEs                        Current:nt 10 sit to stands without UE assist progressing                             Patient will improve FOTO to 55 points overall to demonstrate improvement in functional ability.                         DATKUI at IE:33                        Current:reasesses at 5th visit     PLAN  [x]  Upgrade activities as tolerated     [x]  Continue plan of care  []  Update interventions per flow sheet       []  Discharge due to:_  []  Other:_      Luisa Stein 2/7/2018  9:05 AM    Future Appointments  Date Time Provider Angel Lopez   2/12/2018 9:00 AM [facility]   2/14/2018 9:00 AM [facility]   2/19/2018 9:00 AM [facility]   2/21/2018 9:00 AM [facility]   2/26/2018 9:00 AM [facility]   2/27/2018 8:30 AM Teto Mathur, PT [facility]

## 2018-02-12 ENCOUNTER — HOSPITAL ENCOUNTER (OUTPATIENT)
Dept: PHYSICAL THERAPY | Age: 63
Discharge: HOME | End: 2018-02-12
Payer: COMMERCIAL

## 2018-02-12 PROCEDURE — 97140 MANUAL THERAPY 1/> REGIONS: CPT

## 2018-02-12 PROCEDURE — 97530 THERAPEUTIC ACTIVITIES: CPT

## 2018-02-12 PROCEDURE — 97110 THERAPEUTIC EXERCISES: CPT

## 2018-02-12 NOTE — PROGRESS NOTES
PT DAILY TREATMENT NOTE     Patient Name: Rubi Jaime  Date:2018  : 1955  [x]  Patient  Verified  Payor: Elaine Baumgarten / Plan: Lino Tom / Product Type: PPO /    In time:9:00  Out time: 10:05  Total Treatment Time (min): 65  Visit #: 5 of 12    Treatment Area: right knee pain    SUBJECTIVE  Pain Level (0-10 scale): 1/10  Any medication changes, allergies to medications, adverse drug reactions, diagnosis change, or new procedure performed?: [x] No    [] Yes (see summary sheet for update)  Subjective functional status/changes:   [] No changes reported  \"It's pretty sore and achy today. I think it's the weather. \"    OBJECTIVE    Modality rationale: decrease edema, decrease inflammation and decrease pain to improve the patients ability to perform ADLs with less pain.     Min Type Additional Details    [] Estim:  []Unatt       []IFC  []Premod                        []Other:  []w/ice   []w/heat  Position:  Location:    [] Estim: []Att    []TENS instruct  []NMES                    []Other:  []w/US   []w/ice   []w/heat  Position:  Location:    []  Traction: [] Cervical       []Lumbar                       [] Prone          []Supine                       []Intermittent   []Continuous Lbs:  [] before manual  [] after manual    []  Ultrasound: []Continuous   [] Pulsed                           []1MHz   []3MHz W/cm2:  Location:    []  Iontophoresis with dexamethasone         Location: [] Take home patch   [] In clinic   10 [x]  Ice     []  heat  []  Ice massage  []  Laser   []  Anodyne Position: supine   Location: Right Knee    []  Laser with stim  []  Other:  Position:  Location:    []  Vasopneumatic Device Pressure:       [] lo [] med [] hi   Temperature: [] lo [] med [] hi   [x] Skin assessment post-treatment:  [x]intact []redness- no adverse reaction    []redness - adverse reaction:       35 min Therapeutic Exercise:  [x] See flow sheet :   Rationale: increase ROM, increase strength and improve coordination to improve the patients ability      10 min Therapeutic Activity:  [x]  See flow sheet :   Rationale: increase ROM  to improve the patients ability to return to prior level of physical activity. 10 min Manual Therapy:  Scar mobs, Patella mobs, STM and massage to distal HS and proximal gastroc   Rationale: decrease pain, increase ROM and increase tissue extensibility to improve the patient's ability to perform ADLs with less pain. With   [] TE   [] TA   [] neuro   [] other: Patient Education: [x] Review HEP    [] Progressed/Changed HEP based on:   [] positioning   [] body mechanics   [] transfers   [] heat/ice application    [] other:      Other Objective/Functional Measures: FOTO: 39     Pain Level (0-10 scale) post treatment: 3/10    ASSESSMENT/Changes in Function: Pt showed physical and verbal responses throughout tx indicating discomfort with therex but pt reported they were tolerable. Pt showed significant lurch with amb without quad cane when WB on surgical knee. Pt received CP post tx. Patient will continue to benefit from skilled PT services to modify and progress therapeutic interventions, address functional mobility deficits, address ROM deficits, address strength deficits, analyze and address soft tissue restrictions, analyze and cue movement patterns and analyze and modify body mechanics/ergonomics to attain remaining goals. []  See Plan of Care  []  See progress note/recertification  []  See Discharge Summary         Progress towards goals / Updated goals:  Short Term Goals: To be accomplished in 2 weeks:                        XJKXAQD will report compliance with HEP at least 1x/day to aid in rehabilitation program.                        Status at IE: NA                        Current:given handout of ex this session.  Compliance with bridges, progressing                             Patient will demonstrate AROM of  degrees to aid in completion of ADLs.                       JJCDRV at IE:                          Current: 7-110* AROM progressing           Long Term Goals: To be accomplished in 6 weeks:                        Patient will increase strength to 5/5 throughout B LEs to aid in return to sports.                        Status at IE:4/5                        Current:not tolerating resist testing                              Patient will report pain less than 1-2/10 average to aid in completion of ADLs.                       NFNRWA at IE:8 at worst                        Current:pt reports at most usually on ly 5/10 but up to 7 /10 during ex this session calmed down with ice                             Patient will perform 10 squats with 20lbs with good form/technique to aid in completion of ADLs and return to work                        Status at IE: Pain with sit<>stand, uses UEs                        Current:nt 10 sit to stands without UE assist progressing                             Patient will improve FOTO to 55 points overall to demonstrate improvement in functional ability.                         AZTUKS at IE:33                        Current:reasesses at 5th visit        PLAN  [x]  Upgrade activities as tolerated     [x]  Continue plan of care  []  Update interventions per flow sheet       []  Discharge due to:_  []  Other:_      Abron Don 2/12/2018  9:23 AM    Future Appointments  Date Time Provider Angel Lopez   2/14/2018 9:00  [address] [facility]   2/19/2018 9:00 AM [facility]   2/21/2018 9:00 AM [facility]   2/26/2018 9:00 AM [facility]   2/27/2018 8:30 AM Ifeoma Will, PT [facility]

## 2018-02-14 ENCOUNTER — HOSPITAL ENCOUNTER (OUTPATIENT)
Dept: PHYSICAL THERAPY | Age: 63
Discharge: HOME | End: 2018-02-14
Payer: COMMERCIAL

## 2018-02-14 PROCEDURE — 97110 THERAPEUTIC EXERCISES: CPT

## 2018-02-14 PROCEDURE — 97530 THERAPEUTIC ACTIVITIES: CPT

## 2018-02-14 NOTE — PROGRESS NOTES
PT DAILY TREATMENT NOTE     Patient Name: Radha Wilburn  Date:2018  : 1955  [x]  Patient  Verified  Payor: Royce Pennington / Plan: Juliocesar Parks / Product Type: PPO /    In time:9:08  Out time:10:10  Total Treatment Time (min): 58  Visit #: 6 of 12    Treatment Area: right knee pain    SUBJECTIVE  Pain Level (0-10 scale): 4/10  Any medication changes, allergies to medications, adverse drug reactions, diagnosis change, or new procedure performed?: [x] No    [] Yes (see summary sheet for update)  Subjective functional status/changes:   [] No changes reported  \"I'm just in pain today. \"    OBJECTIVE    Modality rationale: decrease edema, decrease inflammation and decrease pain to improve the patients ability to perform ADLs with less pain.     Min Type Additional Details    [] Estim:  []Unatt       []IFC  []Premod                        []Other:  []w/ice   []w/heat  Position:  Location:    [] Estim: []Att    []TENS instruct  []NMES                    []Other:  []w/US   []w/ice   []w/heat  Position:  Location:    []  Traction: [] Cervical       []Lumbar                       [] Prone          []Supine                       []Intermittent   []Continuous Lbs:  [] before manual  [] after manual    []  Ultrasound: []Continuous   [] Pulsed                           []1MHz   []3MHz W/cm2:  Location:    []  Iontophoresis with dexamethasone         Location: [] Take home patch   [] In clinic   10 [x]  Ice     []  heat  []  Ice massage  []  Laser   []  Anodyne Position: supine  Location:     []  Laser with stim  []  Other:  Position:  Location:    []  Vasopneumatic Device Pressure:       [] lo [] med [] hi   Temperature: [] lo [] med [] hi   [x] Skin assessment post-treatment:  [x]intact []redness- no adverse reaction    []redness - adverse reaction:       32 min Therapeutic Exercise:  [x] See flow sheet :   Rationale: increase ROM, increase strength and improve coordination to improve the patients ability to return to prior level of physical activity. 20 min Therapeutic Activity:  [x]  See flow sheet :   Rationale: increase ROM, increase strength and improve coordination  to improve the patients ability to return to prior level of physical activity. With   [] TE   [] TA   [] neuro   [] other: Patient Education: [x] Review HEP    [] Progressed/Changed HEP based on:   [] positioning   [] body mechanics   [] transfers   [] heat/ice application    [] other:      Other Objective/Functional Measures:      Pain Level (0-10 scale) post treatment: 2/10    ASSESSMENT/Changes in Function: Pt tolerated ex well. Pt had overall decreased pain. Unable to perform manual due to caseload conflicts. Patient will continue to benefit from skilled PT services to modify and progress therapeutic interventions, address functional mobility deficits, address ROM deficits, address strength deficits, analyze and address soft tissue restrictions, analyze and cue movement patterns and analyze and modify body mechanics/ergonomics to attain remaining goals. []  See Plan of Care  []  See progress note/recertification  []  See Discharge Summary         Progress towards goals / Updated goals:  Short Term Goals: To be accomplished in 2 weeks:                        BHVMKLX will report compliance with HEP at least 1x/day to aid in rehabilitation program.                        Status at IE: NA                        Current:given handout of ex this session. Compliance with bridges, progressing                             Patient will demonstrate AROM of  degrees to aid in completion of ADLs.                         CWQMBQ at IE:                          Current: 7-110* AROM progressing           Long Term Goals: To be accomplished in 6 weeks:                        Patient will increase strength to 5/5 throughout B LEs to aid in return to sports.                        Status at IE:4/5                        Current:not tolerating resist testing                              Patient will report pain less than 1-2/10 average to aid in completion of ADLs.                       CJQMHR at IE:8 at worst                        Current:pt reports at most usually on ly 5/10 but up to 7 /10 during ex this session calmed down with ice                             Patient will perform 10 squats with 20lbs with good form/technique to aid in completion of ADLs and return to work                        Status at IE: Pain with sit<>stand, uses UEs                        Current:nt 10 sit to stands without UE assist progressing                             Patient will improve FOTO to 55 points overall to demonstrate improvement in functional ability.                         ZEHINQ at IE:33                        Current:reasesses at 5th visit     PLAN  []  Upgrade activities as tolerated     [x]  Continue plan of care  []  Update interventions per flow sheet       []  Discharge due to:_  []  Other:_      [facility] 2/14/2018  9:12 AM    Future Appointments  Date Time Provider Angel Lopez   2/19/2018 9:00 [address] THE [facility]   [facility]/21/2018 9:00 [facility]   2/26/2018 9:00 AM [facility]   2/27/2018 8:30 AM Michel Quintanilla PT [facility]

## 2018-02-19 ENCOUNTER — APPOINTMENT (OUTPATIENT)
Dept: PHYSICAL THERAPY | Age: 63
End: 2018-02-19
Payer: COMMERCIAL

## 2018-02-20 ENCOUNTER — PATIENT OUTREACH (OUTPATIENT)
Dept: OTHER | Age: 63
End: 2018-02-20

## 2018-02-20 NOTE — PROGRESS NOTES
Resolving current episode Transitions of care complete. No further ED/UC or hospital admissions within 30 days post discharge. Patient attended follow-up appointments as directed. No outreach from patient to [address] [place] [address].

## 2018-02-21 ENCOUNTER — HOSPITAL ENCOUNTER (OUTPATIENT)
Dept: PHYSICAL THERAPY | Age: 63
Discharge: HOME | End: 2018-02-21
Payer: COMMERCIAL

## 2018-02-21 PROCEDURE — 97530 THERAPEUTIC ACTIVITIES: CPT

## 2018-02-21 PROCEDURE — 97140 MANUAL THERAPY 1/> REGIONS: CPT

## 2018-02-21 PROCEDURE — 97110 THERAPEUTIC EXERCISES: CPT

## 2018-02-21 NOTE — PROGRESS NOTES
PT DAILY TREATMENT NOTE     Patient Name: Braeden Ignacio  Date:2018  : 1955  [x]  Patient  Verified  Payor: Anahi Grade / Plan: Ursula Vyas / Product Type: PPO /    In time:9:05  Out time:10:20  Total Treatment Time (min): 75  Visit #: 7 of 12    Treatment Area: right knee pain    SUBJECTIVE  Pain Level (0-10 scale): 2/10  Any medication changes, allergies to medications, adverse drug reactions, diagnosis change, or new procedure performed?: [x] No    [] Yes (see summary sheet for update)  Subjective functional status/changes:   [] No changes reported  \"I have some real stiffness and soreness. \"    OBJECTIVE    Modality rationale: decrease edema, decrease inflammation and decrease pain to improve the patients ability to return to prior level of physical activity.     Min Type Additional Details    [] Estim:  []Unatt       []IFC  []Premod                        []Other:  []w/ice   []w/heat  Position:  Location:    [] Estim: []Att    []TENS instruct  []NMES                    []Other:  []w/US   []w/ice   []w/heat  Position:  Location:    []  Traction: [] Cervical       []Lumbar                       [] Prone          []Supine                       []Intermittent   []Continuous Lbs:  [] before manual  [] after manual    []  Ultrasound: []Continuous   [] Pulsed                           []1MHz   []3MHz W/cm2:  Location:    []  Iontophoresis with dexamethasone         Location: [] Take home patch   [] In clinic   10 [x]  Ice     []  heat  []  Ice massage  []  Laser   []  Anodyne Position: supine  Location: Right Knee    []  Laser with stim  []  Other:  Position:  Location:    []  Vasopneumatic Device Pressure:       [] lo [] med [] hi   Temperature: [] lo [] med [] hi   [x] Skin assessment post-treatment:  [x]intact []redness- no adverse reaction    []redness - adverse reaction:       40 min Therapeutic Exercise:  [x] See flow sheet :   Rationale: increase ROM, increase strength and improve coordination to improve the patients ability to perform ADLs with less pain. 15 min Therapeutic Activity:  [x]  See flow sheet :   Rationale: increase ROM, increase strength and improve coordination  to improve the patients ability to perform ADLs with less pain. 10 min Manual Therapy:  STM an TrP to distal HS, patella mobs, PROM flex/Ext with OP   Rationale: decrease pain, increase ROM and increase tissue extensibility to improve the patient's ability to perform ADLs with less pain. With   [] TE   [] TA   [] neuro   [] other: Patient Education: [x] Review HEP    [] Progressed/Changed HEP based on:   [] positioning   [] body mechanics   [] transfers   [] heat/ice application    [] other:      Other Objective/Functional Measures:      Pain Level (0-10 scale) post treatment: 3-4/10    ASSESSMENT/Changes in Function: Pt very painful today. Pt seemed very sensitive to most activity. Pt performed therex that was tolerable. Pt very sensative to patella mobs during manual stretching. Pt received CP post tx. Patient will continue to benefit from skilled PT services to modify and progress therapeutic interventions, address functional mobility deficits, address ROM deficits, address strength deficits, analyze and address soft tissue restrictions, analyze and cue movement patterns and analyze and modify body mechanics/ergonomics to attain remaining goals. []  See Plan of Care  []  See progress note/recertification  []  See Discharge Summary         Progress towards goals / Updated goals:  Short Term Goals: To be accomplished in 2 weeks:                        CUFLLJJ will report compliance with HEP at least 1x/day to aid in rehabilitation program.                        Status at IE: NA                        Current:given handout of ex this session.  Compliance with bridges, progressing                             Patient will demonstrate AROM of  degrees to aid in completion of ADLs.                        Status at IE:                          Current: 7-110* AROM progressing           Long Term Goals: To be accomplished in 6 weeks:                        Patient will increase strength to 5/5 throughout B LEs to aid in return to sports.                        Status at IE:4/5                        Current:not tolerating resist testing                              Patient will report pain less than 1-2/10 average to aid in completion of ADLs.                       NPNMAN at IE:8 at worst                        Current:pt reports at most usually on ly 5/10 but up to 7 /10 during ex this session calmed down with ice                             Patient will perform 10 squats with 20lbs with good form/technique to aid in completion of ADLs and return to work                        Status at IE: Pain with sit<>stand, uses UEs                        Current:nt 10 sit to stands without UE assist progressing                             Patient will improve FOTO to 55 points overall to demonstrate improvement in functional ability.                         SFLLBV at IE:33                        Current:reasesses at 5th visit        PLAN  [x]  Upgrade activities as tolerated     [x]  Continue plan of care  []  Update interventions per flow sheet       []  Discharge due to:_  []  Other:_      Mic Funk 2/21/2018  9:26 AM    Future Appointments  Date Time Provider Angel Lopez   2/26/2018 9:00 [address] THE [facility]   2/27/2018 8:30 AM Teto Mathur PT [facility]

## 2018-02-27 ENCOUNTER — HOSPITAL ENCOUNTER (OUTPATIENT)
Dept: PHYSICAL THERAPY | Age: 63
Discharge: HOME | End: 2018-02-27
Payer: COMMERCIAL

## 2018-02-27 PROCEDURE — 97110 THERAPEUTIC EXERCISES: CPT | Performed by: PHYSICAL THERAPIST

## 2018-02-27 NOTE — PROGRESS NOTES
PT DAILY TREATMENT NOTE 12-    Patient Name: Ilene Baig  Date:2018  : 1955  [x]  Patient  Verified  Payor: Cinthya Flower / Plan: Kelsi Vazquez / Product Type: PPO /    In time:8:35  Out time:9:41  Total Treatment Time (min): 77  Visit #: 8 of 12    Treatment Area: right knee pain    SUBJECTIVE  Pain Level (0-10 scale): 4  Any medication changes, allergies to medications, adverse drug reactions, diagnosis change, or new procedure performed?: [x] No    [] Yes (see summary sheet for update)  Subjective functional status/changes:   [] No changes reported  Still feels pretty sensitive. Doesn't feel ready to return to work yet    OBJECTIVE    Modality rationale: decrease pain to improve the patients ability to complete ADLs   Min Type Additional Details   10 [x]  Ice     []  heat  []  Ice massage  []  Laser   []  Anodyne Position: supine  Location: R knee   [] Skin assessment post-treatment:  []intact []redness- no adverse reaction    []redness - adverse reaction:     56 min Therapeutic Exercise:  [x] See flow sheet :   Rationale: increase ROM, increase strength and decrease pain to improve the patients ability to complete ADLs        With   [] TE   [] TA   [] neuro   [] other: Patient Education: [x] Review HEP    [] Progressed/Changed HEP based on:   [] positioning   [] body mechanics   [] transfers   [] heat/ice application    [] other:      Other Objective/Functional Measures:      Pain Level (0-10 scale) post treatment: 4    ASSESSMENT/Changes in Function: Patient responds well to treatment session. Patient continues to have limited ROM, along with allodynia along the knee. Initiated mirror therapy to aid in reduction of CS symptoms. Will check progress with each session. Patient continues to have strength and ROM deficits affecting ADLs. Will continue to benefit from skilled PT services to address these deficits.  No adverse effects were noted from today's treatment session      Patient will continue to benefit from skilled PT services to modify and progress therapeutic interventions, address functional mobility deficits, address ROM deficits, address strength deficits, analyze and address soft tissue restrictions, analyze and cue movement patterns, analyze and modify body mechanics/ergonomics and assess and modify postural abnormalities to attain remaining goals. []  See Plan of Care  [x]  See progress note/recertification  []  See Discharge Summary         Progress towards goals / Updated goals:  Short Term Goals: To be accomplished in 2 weeks:                        MAEVE will report compliance with HEP at least 1x/day to aid in rehabilitation program.                        Status at IE: NA                        Current:given handout of ex this session. Compliance with bridges, progressing                             Patient will demonstrate AROM of  degrees to aid in completion of ADLs.                       IAEHVE at IE:                          Current: 7-110* AROM progressing           Long Term Goals: To be accomplished in 6 weeks:                        Patient will increase strength to 5/5 throughout B LEs to aid in return to sports.                        Status at IE:4/5                        Current:not tolerating resist testing                              Patient will report pain less than 1-2/10 average to aid in completion of ADLs.                         WWKFUK at IE:8 at worst                        Current:pt reports at most usually on ly 5/10 but up to 7 /10 during ex this session calmed down with ice                             Patient will perform 10 squats with 20lbs with good form/technique to aid in completion of ADLs and return to work                        Status at IE: Pain with sit<>stand, uses UEs                        Current:nt 10 sit to stands without UE assist progressing                             Patient will improve FOTO to 55 points overall to demonstrate improvement in functional ability.                       AVHLUX at IE:33                        Current:reasesses at 5th visit     PLAN  [x]  Upgrade activities as tolerated     [x]  Continue plan of care  []  Update interventions per flow sheet       []  Discharge due to:_  []  Other:_      Nicholas Solis PT, DPT 2/27/2018  9:00 AM    No future appointments.

## 2018-02-28 ENCOUNTER — APPOINTMENT (OUTPATIENT)
Dept: PHYSICAL THERAPY | Age: 63
End: 2018-02-28
Payer: COMMERCIAL

## 2018-03-01 ENCOUNTER — HOSPITAL ENCOUNTER (OUTPATIENT)
Dept: PHYSICAL THERAPY | Age: 63
Discharge: HOME | End: 2018-03-01
Payer: COMMERCIAL

## 2018-03-01 PROCEDURE — 97110 THERAPEUTIC EXERCISES: CPT | Performed by: PHYSICAL THERAPIST

## 2018-03-01 PROCEDURE — 97016 VASOPNEUMATIC DEVICE THERAPY: CPT | Performed by: PHYSICAL THERAPIST

## 2018-03-01 PROCEDURE — 97112 NEUROMUSCULAR REEDUCATION: CPT | Performed by: PHYSICAL THERAPIST

## 2018-03-06 ENCOUNTER — HOSPITAL ENCOUNTER (OUTPATIENT)
Dept: PHYSICAL THERAPY | Age: 63
Discharge: HOME | End: 2018-03-06
Payer: COMMERCIAL

## 2018-03-06 PROCEDURE — 97110 THERAPEUTIC EXERCISES: CPT | Performed by: PHYSICAL THERAPIST

## 2018-03-06 NOTE — PROGRESS NOTES
PT DAILY TREATMENT NOTE     Patient Name: Linda Krueger  Date:3/6/2018  : 1955  [x]  Patient  Verified  Payor: Art Valle / Plan: Simon Sharif / Product Type: PPO /    In time:9:33  Out time:10:35  Total Treatment Time (min): 62  Visit #: 10 of 20     Treatment Area: right knee pain    SUBJECTIVE  Pain Level (0-10 scale): 3  Any medication changes, allergies to medications, adverse drug reactions, diagnosis change, or new procedure performed?: [x] No    [] Yes (see summary sheet for update)  Subjective functional status/changes:   [] No changes reported  Feels alright. No new issues. OBJECTIVE    62 min Therapeutic Exercise:  [x] See flow sheet : education regarding sensitized pain states   Rationale: increase ROM, increase strength and decrease pain to improve the patients ability to complete ADLs       With   [] TE   [] TA   [] neuro   [] other: Patient Education: [x] Review HEP    [] Progressed/Changed HEP based on:   [] positioning   [] body mechanics   [] transfers   [] heat/ice application    [] other:        Pain Level (0-10 scale) post treatment: 3    ASSESSMENT/Changes in Function: Patient responds well to treatment session. Patient continues to experience allodynia. Instructed to only move her L LE during mirror training. Advised to attempt tights to decrease allodynia pain at night and allow for better sleep No adverse effects were noted from today's treatment session      Patient will continue to benefit from skilled PT services to modify and progress therapeutic interventions, address functional mobility deficits, address ROM deficits, address strength deficits, analyze and address soft tissue restrictions, analyze and cue movement patterns, analyze and modify body mechanics/ergonomics and assess and modify postural abnormalities to attain remaining goals.      []  See Plan of Care  []  See progress note/recertification  []  See Discharge Summary         Progress towards goals / Updated goals:  Short Term Goals: To be accomplished in 2 weeks:                        NNRRQXN will report compliance with HEP at least 1x/day to aid in rehabilitation program.                        Status at IE: NA                        Current:given handout of ex this session. Compliance with bridges, progressing                             Patient will demonstrate AROM of  degrees to aid in completion of ADLs.                       NBLQEW at IE:                          Current: 7-110* AROM progressing           Long Term Goals: To be accomplished in 6 weeks:                        Patient will increase strength to 5/5 throughout B LEs to aid in return to sports.                        Status at IE:4/5                        Current:not tolerating resist testing                              Patient will report pain less than 1-2/10 average to aid in completion of ADLs.                       SDJMMI at IE:8 at worst                        Current:pt reports at most usually on ly 5/10 but up to 7 /10 during ex this session calmed down with ice                             Patient will perform 10 squats with 20lbs with good form/technique to aid in completion of ADLs and return to work                        Status at IE: Pain with sit<>stand, uses UEs                        Current:nt 10 sit to stands without UE assist progressing                             Patient will improve FOTO to 55 points overall to demonstrate improvement in functional ability.                         OTUYDL at IE:33                        Current:reasesses at 5th visit     PLAN  []  Upgrade activities as tolerated     [x]  Continue plan of care  []  Update interventions per flow sheet       []  Discharge due to:_  []  Other:_      Amber Melchor PT, DPT 3/6/2018  9:56 AM    Future Appointments  Date Time Provider Angel Lopez   3/8/2018 9:30 AM Amber Melchor PT [facility]   3/13/2018 9:30 AM Josselyn Dickinson Sigrid Fothergill THE [facility]   3/15/2018 9:30 AM Darrick Mares PT [facility] THE [facility]   3/20/2018 9:30 AM Darrick Mares PT [facility] THE [facility]   3/22/2018 9:30 AM Darrick Mares PT [facility] THE [facility]   3/27/2018 9:00 AM Darrick Mares PT [facility] THE [facility]   3/29/2018 9:00 AM Darrick Mares PT [facility] THE [facility]

## 2018-03-08 ENCOUNTER — HOSPITAL ENCOUNTER (OUTPATIENT)
Dept: PHYSICAL THERAPY | Age: 63
Discharge: HOME | End: 2018-03-08
Payer: COMMERCIAL

## 2018-03-08 PROCEDURE — 97110 THERAPEUTIC EXERCISES: CPT | Performed by: PHYSICAL THERAPIST

## 2018-03-08 NOTE — PROGRESS NOTES
PT DAILY TREATMENT NOTE     Patient Name: Ilene Baig  Date:3/8/2018  : 1955  [x]  Patient  Verified  Payor: Cinthya Flower / Plan: Kelsi Vazquez / Product Type: PPO /    In time:9:30  Out time:10:23  Total Treatment Time (min): 53  Visit #: 11 of 20    Treatment Area: right knee pain    SUBJECTIVE  Pain Level (0-10 scale): 2  Any medication changes, allergies to medications, adverse drug reactions, diagnosis change, or new procedure performed?: [x] No    [] Yes (see summary sheet for update)  Subjective functional status/changes:   [] No changes reported  Feels better since sleeping more. Although, didn't sleep too well last night. But the tights do help to sleep better    OBJECTIVE    CP: 10 mins, supine R Knee    43 min Therapeutic Exercise:  [x] See flow sheet :   Rationale: increase ROM, increase strength and decrease pain to improve the patients ability to complete ADLs       With   [] TE   [] TA   [] neuro   [] other: Patient Education: [x] Review HEP    [] Progressed/Changed HEP based on:   [] positioning   [] body mechanics   [] transfers   [] heat/ice application    [] other:         Pain Level (0-10 scale) post treatment: 0    ASSESSMENT/Changes in Function: Patient responds well to treatment session. Session kept somewhat short due to other arrangements. Instructed to wear tights for next session to reduce light touch sensitivity over the incision. No adverse effects were noted from today's treatment session      Patient will continue to benefit from skilled PT services to modify and progress therapeutic interventions, address functional mobility deficits, address ROM deficits, address strength deficits, analyze and address soft tissue restrictions, analyze and cue movement patterns, analyze and modify body mechanics/ergonomics and assess and modify postural abnormalities to attain remaining goals.      []  See Plan of Care  []  See progress note/recertification  []  See Discharge Summary         Progress towards goals / Updated goals:  Short Term Goals: To be accomplished in 2 weeks:                        HMSACDF will report compliance with HEP at least 1x/day to aid in rehabilitation program.                        Status at IE: NA                        Current:given handout of ex this session. Compliance with bridges, progressing                             Patient will demonstrate AROM of  degrees to aid in completion of ADLs.                       BXDMZT at IE:                          Current: 7-110* AROM progressing           Long Term Goals: To be accomplished in 6 weeks:                        Patient will increase strength to 5/5 throughout B LEs to aid in return to sports.                        Status at IE:4/5                        Current:not tolerating resist testing                              Patient will report pain less than 1-2/10 average to aid in completion of ADLs.                       LGIZHR at IE:8 at worst                        Current:pt reports at most usually on ly 5/10 but up to 7 /10 during ex this session calmed down with ice                             Patient will perform 10 squats with 20lbs with good form/technique to aid in completion of ADLs and return to work                        Status at IE: Pain with sit<>stand, uses UEs                        Current:nt 10 sit to stands without UE assist progressing                             Patient will improve FOTO to 55 points overall to demonstrate improvement in functional ability.                         VCANUW at IE:33                        Current:reasesses at 5th visit     PLAN  []  Upgrade activities as tolerated     [x]  Continue plan of care  []  Update interventions per flow sheet       []  Discharge due to:_  []  Other:_      Angela Yarbrough, PT, DPT 3/8/2018  9:40 AM    Future Appointments  Date Time Provider Angel Lopez   3/13/2018 9:30 AM Angela Yarbrough, [facility]   3/15/2018 9:30 AM Rosanna Castillo [facility]   3/20/2018 9:30 AM Rosanna Castillo, [facility]   3/22/2018 9:30 AM Rosanna Castillo, [facility]   3/27/2018 9:00 AM Rosanna Castillo [facility]   3/29/2018 9:00 AM Rosanna Castillo, [facility]

## 2018-03-13 ENCOUNTER — HOSPITAL ENCOUNTER (OUTPATIENT)
Dept: PHYSICAL THERAPY | Age: 63
Discharge: HOME | End: 2018-03-13
Payer: COMMERCIAL

## 2018-03-13 PROCEDURE — 97110 THERAPEUTIC EXERCISES: CPT | Performed by: PHYSICAL THERAPIST

## 2018-03-13 NOTE — PROGRESS NOTES
[facility] at [address], [address] [facility] [address]  Ph [phone]  Fx [phone]    Physical Therapy Progress Note  Patient name: Mu Acevedo Start of Care: 2018   Referral source: Neil Caraballo MD : 1955   Medical/Treatment Diagnosis: right knee pain Onset Date:2018     Prior Hospitalization: see medical history Provider#: [id number]   Medications: Verified on Patient Summary List    Comorbidities: OA, tobacco use  Prior Level of Function:independent w/ ADLs, sedentary    Visits from Start of Care: 12    Missed Visits: 2    Established Goals:         Excellent           Good         Limited           None  [x] Increased ROM   []  [x]  []  []  [x] Increased Strength   []  []  [x]  []  [x] Increased Mobility   []  []  [x]  []   [] Decreased Pain   []  []  [x]  []     Key Functional Changes: improved ROM  Updated Goals: to be achieved in 4 weeks:   Long Term Goals: To be accomplished in 4 weeks:                        RKVFYKH will increase strength to 5/5 throughout B LEs to aid in return to sports.                        ZWTFTC at IE:4/5                        Current: 4/5 in ext/flex, progressing                                     Patient will demonstrate AROM of  degrees to aid in completion of ADLs.                       JTQUXV at IE:                          Current: 7-110* AROM progressing                             Patient will report pain less than 1-2/10 average to aid in completion of ADLs.                         BDTCMU at IE:8 at worst                        Current: 4/10 at worst, progressing                            Patient will perform 10 squats with 20lbs with good form/technique to aid in completion of ADLs and return to work                        Status at IE: Pain with sit<>stand, uses UEs                        Current: currently BW squats without weight, progressing                            Patient will improve FOTO to 55 points overall to demonstrate improvement in functional ability.                       QEYIKV at IE:33                        Current:43/100, progressing    ASSESSMENT/RECOMMENDATIONS:  Patient responds well to treatment session. Patient is now 9 wks s/p R TKA (1/8/2018). Patient has made some improvements over time, but continues to have a fair amount of pain. Most of which is topical, typical of allodynia. She does appear to have some infrapatellar pocket of swelling. No signs/symptoms of infection, but patient's continued pain levels are of concern. May benefit from plain films if not otherwise indicated at this time. Otherwise, patient will continued to benefit from skilled PT services to address remaining deficits listed below.     Patient will continue to benefit from skilled PT services to modify and progress therapeutic interventions, address functional mobility deficits, address ROM deficits, address strength deficits, analyze and address soft tissue restrictions, analyze and cue movement patterns, analyze and modify body mechanics/ergonomics and assess and modify postural abnormalities to attain remaining goals.     [x]Continue therapy per initial plan/protocol at a frequency of  2-3 x per week for 4 weeks  []Continue therapy with the following recommended changes:_____________________      _____________________________________________________________________  []Discontinue therapy progressing towards or have reached established goals  []Discontinue therapy due to lack of appreciable progress towards goals  []Discontinue therapy due to lack of attendance or compliance  []Await Physician's recommendations/decisions regarding therapy  []Other:________________________________________________________________    Thank you for this referral.   Doris Guerrero, PT, DPT 3/13/2018 11:20 AM    NOTE TO PHYSICIAN:  Karen Rosenthal 172   [facility]X TO [facility]: [phone]  If you are unable to process this request in 24 hours please contact our office: [phone]      ____ I have read the above report and request that my patient continue therapy with the following changes/special instructions:  ____ I have read the above report and request that my patient be discharged from therapy    Physician's Signature:_____________________ Date:___________Time:__________

## 2018-03-13 NOTE — PROGRESS NOTES
PT DAILY TREATMENT NOTE     Patient Name: Zana Hernández  Date:3/13/2018  : 1955  [x]  Patient  Verified  Payor: Keshav Roberts / Plan: Amol Shove / Product Type: PPO /    In time:9:35  Out time:10:33  Total Treatment Time (min): 58  Visit #: 12 of 20    Treatment Area: right knee pain    SUBJECTIVE  Pain Level (0-10 scale): 2  Any medication changes, allergies to medications, adverse drug reactions, diagnosis change, or new procedure performed?: [x] No    [] Yes (see summary sheet for update)  Subjective functional status/changes:   [] No changes reported  Feels like it is getting better, but continues to have a lot of pain in the knee    OBJECTIVE    58 min Therapeutic Exercise:  [x] See flow sheet :   Rationale: increase ROM, increase strength and decrease pain to improve the patients ability to complete ADLs        With   [] TE   [] TA   [] neuro   [] other: Patient Education: [x] Review HEP    [] Progressed/Changed HEP based on:   [] positioning   [] body mechanics   [] transfers   [] heat/ice application    [] other:      Other Objective/Functional Measures: ROM:  AROM, MMT: 5/5 grossly in LEs, with exception of 4/5 for R knee ext and flex     Pain Level (0-10 scale) post treatment: 2    ASSESSMENT/Changes in Function: Patient responds well to treatment session. Patient is now 9 wks s/p R TKA (2018). Patient has made some improvements over time, but continues to have a fair amount of pain. Most of which is topical, typical of allodynia. She does appear to have some infra patellar pocket of swelling, along with  .  No adverse effects were noted from today's treatment session      Patient will continue to benefit from skilled PT services to modify and progress therapeutic interventions, address functional mobility deficits, address ROM deficits, address strength deficits, analyze and address soft tissue restrictions, analyze and cue movement patterns, analyze and modify body mechanics/ergonomics and assess and modify postural abnormalities to attain remaining goals. []  See Plan of Care  [x]  See progress note/recertification  []  See Discharge Summary         Progress towards goals / Updated goals:  Short Term Goals: To be accomplished in 2 weeks:                        IXIZWBI will report compliance with HEP at least 1x/day to aid in rehabilitation program.                        Status at IE: NA                        Current:given handout of ex this session. Compliance with bridges, progressing                             Patient will demonstrate AROM of  degrees to aid in completion of ADLs.                       NKTBJV at IE:                          Current: 7-110* AROM progressing           Long Term Goals: To be accomplished in 6 weeks:                        Patient will increase strength to 5/5 throughout B LEs to aid in return to sports.                        Status at IE:4/5                        Current: 4/5 in ext/flex, progressing                            Patient will report pain less than 1-2/10 average to aid in completion of ADLs.                       VNZFHX at IE:8 at worst                        Current: 4/10 at worst, progressing                            Patient will perform 10 squats with 20lbs with good form/technique to aid in completion of ADLs and return to work                        Status at IE: Pain with sit<>stand, uses UEs                        Current: currently BW squats without weight, progressing                            Patient will improve FOTO to 55 points overall to demonstrate improvement in functional ability.                         LPGVNT at IE:33                        Current:43/100, progressing       PLAN  []  Upgrade activities as tolerated     [x]  Continue plan of care  []  Update interventions per flow sheet       []  Discharge due to:_  []  Other:_      Edwin Helms, PT, DPT 3/13/2018  10:51 AM    Future Appointments  Date Time Provider Angel Lopez   3/15/2018 9:30 AM Teto Mathur PT [facility]   3/20/2018 9:30 AM Teto Mathur PT [facility]   3/22/2018 9:30 AM Teto Mathur PT [facility]   3/27/2018 9:00 AM Teto Mathur PT [facility]   3/29/2018 9:00 AM Teto Mathur PT [facility]

## 2018-03-15 ENCOUNTER — HOSPITAL ENCOUNTER (OUTPATIENT)
Dept: PHYSICAL THERAPY | Age: 63
Discharge: HOME | End: 2018-03-15
Payer: COMMERCIAL

## 2018-03-20 ENCOUNTER — HOSPITAL ENCOUNTER (OUTPATIENT)
Dept: PHYSICAL THERAPY | Age: 63
Discharge: HOME | End: 2018-03-20
Payer: COMMERCIAL

## 2018-03-20 PROCEDURE — 97530 THERAPEUTIC ACTIVITIES: CPT | Performed by: PHYSICAL THERAPIST

## 2018-03-20 PROCEDURE — 97110 THERAPEUTIC EXERCISES: CPT | Performed by: PHYSICAL THERAPIST

## 2018-03-20 NOTE — PROGRESS NOTES
PT DAILY TREATMENT NOTE     Patient Name: Ilene Baig  Date:3/20/2018  : 1955  [x]  Patient  Verified  Payor: Cinthya Flower / Plan: Kesli Vazquez / Product Type: PPO /    In time:9:30  Out time:10:03  Total Treatment Time (min): 33  Visit #: 13 of 32    Treatment Area: right knee pain    SUBJECTIVE  Pain Level (0-10 scale): 2  Any medication changes, allergies to medications, adverse drug reactions, diagnosis change, or new procedure performed?: [x] No    [] Yes (see summary sheet for update)  Subjective functional status/changes:   [] No changes reported  Feeling better overall. X-rays were good. Given some anti-inflammatory medication to help with swelling. OBJECTIVE    23 min Therapeutic Exercise:  [x] See flow sheet :   Rationale: increase ROM, increase strength and decrease pain to improve the patients ability to complete ADLs    10 min Therapeutic Activity:  [x]  See flow sheet :   Rationale: increase ROM, increase strength and improve coordination  to improve the patients ability to complete ADLs          With   [] TE   [] TA   [] neuro   [] other: Patient Education: [x] Review HEP    [] Progressed/Changed HEP based on:   [] positioning   [] body mechanics   [] transfers   [] heat/ice application    [] other:        Pain Level (0-10 scale) post treatment: 2    ASSESSMENT/Changes in Function: Patient responds well to treatment session. Patient responds well to weighted squats and increased resistances. Educated on progressing into pain with central/peripheral sensitivity. She was receptive.  No adverse effects were noted from today's treatment session      Patient will continue to benefit from skilled PT services to modify and progress therapeutic interventions, address functional mobility deficits, address ROM deficits, address strength deficits, analyze and address soft tissue restrictions, analyze and cue movement patterns, analyze and modify body mechanics/ergonomics and assess and modify postural abnormalities to attain remaining goals. []  See Plan of Care  []  See progress note/recertification  []  See Discharge Summary         Progress towards goals / Updated goals:  Updated Goals: to be achieved in 4 weeks:                         Long Term Goals: To be accomplished in 4 weeks:                        Patient will increase strength to 5/5 throughout B LEs to aid in return to sports.                        Status at IE:4/5                        Current: 4/5 in ext/flex, progressing                                       Patient will demonstrate AROM of  degrees to aid in completion of ADLs.                       NQCOBF at IE:                          Current: 7-110* AROM progressing                             Patient will report pain less than 1-2/10 average to aid in completion of ADLs.                       CVYYBO at IE:8 at worst                        Current: 4/10 at worst, progressing                            Patient will perform 10 squats with 20lbs with good form/technique to aid in completion of ADLs and return to work                        Status at IE: Pain with sit<>stand, uses UEs                        Current: currently BW squats without weight, progressing                            Patient will improve FOTO to 55 points overall to demonstrate improvement in functional ability.                         BVRWDV at IE:33                        Current:43/100, progressing    PLAN  []  Upgrade activities as tolerated     [x]  Continue plan of care  []  Update interventions per flow sheet       []  Discharge due to:_  []  Other:_      Juanito Gotti PT, DPT 3/20/2018  9:43 AM    Future Appointments  Date Time Provider Angel Lopez   3/22/2018 9:30 AM Juanito Gotti PT [facility]   3/23/2018 11:00 AM Collin Curling [facility]   3/27/2018 9:00 AM Collin Curling [facility]   3/29/2018 9:00 AM Juanito Gotti PT [facility]   4/3/2018 10:00 AM Bharat Pichardo PT [facility]   4/5/2018 9:30 AM Bharat Pichardo PT [facility]   4/10/2018 10:00 AM Bharat Pichardo PT [facility]   4/12/2018 9:30 AM Bharat Pichardo PT [facility]

## 2018-03-22 ENCOUNTER — HOSPITAL ENCOUNTER (OUTPATIENT)
Dept: PHYSICAL THERAPY | Age: 63
Discharge: HOME | End: 2018-03-22
Payer: COMMERCIAL

## 2018-03-22 PROCEDURE — 97110 THERAPEUTIC EXERCISES: CPT | Performed by: PHYSICAL THERAPIST

## 2018-03-22 PROCEDURE — 97016 VASOPNEUMATIC DEVICE THERAPY: CPT | Performed by: PHYSICAL THERAPIST

## 2018-03-22 PROCEDURE — 97530 THERAPEUTIC ACTIVITIES: CPT | Performed by: PHYSICAL THERAPIST

## 2018-03-22 NOTE — PROGRESS NOTES
PT DAILY TREATMENT NOTE 12-    Patient Name: Kong Cedillo  Date:3/22/2018  : 1955  [x]  Patient  Verified  Payor: Marcellus Mathew / Plan: Merlin Reel / Product Type: PPO /    In time:9:30  Out time:10:30  Total Treatment Time (min): 60  Visit #: 14 of 20    Treatment Area: right knee pain    SUBJECTIVE  Pain Level (0-10 scale): 2  Any medication changes, allergies to medications, adverse drug reactions, diagnosis change, or new procedure performed?: [x] No    [] Yes (see summary sheet for update)  Subjective functional status/changes:   [] No changes reported  Feels alright. No new issues. Still just sore    OBJECTIVE    Modality rationale: decrease edema, decrease inflammation and decrease pain to improve the patients ability to complete ADLs   Min Type Additional Details   10 [x]  Vasopneumatic Device Pressure:       [x] lo [] med [] hi   Temperature: [x] lo [] med [] hi   [] Skin assessment post-treatment:  []intact []redness- no adverse reaction    []redness - adverse reaction:     40 min Therapeutic Exercise:  [x] See flow sheet :   Rationale: increase ROM, increase strength and decrease pain to improve the patients ability to complete ADLs    10 min Therapeutic Activity:  [x]  See flow sheet :   Rationale: increase ROM, increase strength and improve coordination  to improve the patients ability to complete ADLs      With   [] TE   [] TA   [] neuro   [] other: Patient Education: [x] Review HEP    [] Progressed/Changed HEP based on:   [] positioning   [] body mechanics   [] transfers   [] heat/ice application    [] other:         Pain Level (0-10 scale) post treatment: 3-4    ASSESSMENT/Changes in Function: Patient responds well to treatment session. Continues to be challenged by exercises in terms of difficulty with effort and pain affecting function, but puts forth a good effort. Progress as tolerated.  No adverse effects were noted from today's treatment session      Patient will continue to benefit from skilled PT services to modify and progress therapeutic interventions, address functional mobility deficits, address ROM deficits, address strength deficits, analyze and address soft tissue restrictions, analyze and cue movement patterns, analyze and modify body mechanics/ergonomics and assess and modify postural abnormalities to attain remaining goals. []  See Plan of Care  []  See progress note/recertification  []  See Discharge Summary         Progress towards goals / Updated goals:  Updated Goals: to be achieved in 4 weeks:                         Long Term Goals: To be accomplished in 4 weeks:                        Patient will increase strength to 5/5 throughout B LEs to aid in return to sports.                        Status at IE:4/5                        Current: 4/5 in ext/flex, progressing                                       WFPNSJW will demonstrate AROM of  degrees to aid in completion of ADLs.                       MBJWSK at IE:                          Current: 7-110* AROM progressing                             Patient will report pain less than 1-2/10 average to aid in completion of ADLs.                       CXELDP at IE:8 at worst                        Current: 4/10 at worst, progressing                            Patient will perform 10 squats with 20lbs with good form/technique to aid in completion of ADLs and return to work                        Status at IE: Pain with sit<>stand, uses UEs                        Current: currently BW squats without weight, progressing                            Patient will improve FOTO to 55 points overall to demonstrate improvement in functional ability.                         WLBNJI at IE:33                        Current:43/100, progressing    PLAN  []  Upgrade activities as tolerated     [x]  Continue plan of care  []  Update interventions per flow sheet       []  Discharge due to:_  []  Other:_ Michel Quintanilla, PT, DPT 3/22/2018  9:53 AM    Future Appointments  Date Time Provider Angel Lopez   3/23/2018 11:00 AM [facility]   3/27/2018 9:00 AM [facility]   3/29/2018 9:00 AM Michel Quintanilla PT [facility]   4/3/2018 10:00 AM Michel Quintanilla PT [facility]   4/5/2018 9:30 AM Michel Quintanilla PT [facility]   4/10/2018 10:00 AM Michel Quintanilla PT [facility]   4/12/2018 9:30 AM Michel Quintanilla PT [facility]

## 2018-03-23 ENCOUNTER — APPOINTMENT (OUTPATIENT)
Dept: PHYSICAL THERAPY | Age: 63
End: 2018-03-23
Payer: COMMERCIAL

## 2018-03-27 ENCOUNTER — HOSPITAL ENCOUNTER (OUTPATIENT)
Dept: PHYSICAL THERAPY | Age: 63
Discharge: HOME | End: 2018-03-27
Payer: COMMERCIAL

## 2018-03-27 PROCEDURE — 97530 THERAPEUTIC ACTIVITIES: CPT

## 2018-03-27 PROCEDURE — 97110 THERAPEUTIC EXERCISES: CPT

## 2018-03-27 NOTE — PROGRESS NOTES
PT DAILY TREATMENT NOTE 12    Patient Name: Erin Monzon  Date:3/27/2018  : 1955  [x]  Patient  Verified  Payor: Phil Prieto / Plan: Suzan Workman / Product Type: PPO /    In time:9:36  Out time:10:10  Total Treatment Time (min): 34  Visit #: 15 of 20    Treatment Area: right knee pain    SUBJECTIVE  Pain Level (0-10 scale): 1/10  Any medication changes, allergies to medications, adverse drug reactions, diagnosis change, or new procedure performed?: [x] No    [] Yes (see summary sheet for update)  Subjective functional status/changes:   [] No changes reported  \"I feel mediocre today. \"    OBJECTIVE    22 min Therapeutic Exercise:  [x] See flow sheet :   Rationale: increase ROM, increase strength and improve coordination to improve the patients ability to perform ADLs with less pain. 12 min Therapeutic Activity:  [x]  See flow sheet :   Rationale: increase ROM, increase strength and improve coordination  to improve the patients ability to perform ADLs with less pain. With   [] TE   [] TA   [] neuro   [] other: Patient Education: [x] Review HEP    [] Progressed/Changed HEP based on:   [] positioning   [] body mechanics   [] transfers   [] heat/ice application    [] other:      Other Objective/Functional Measures: FOTO: 45     Pain Level (0-10 scale) post treatment: 1/10    ASSESSMENT/Changes in Function: Pt continues to be challenged by therex. Pt had increased fatigue with standing therex but not above tolerance. Pt denied modalities post tx. Patient will continue to benefit from skilled PT services to modify and progress therapeutic interventions, address functional mobility deficits, address ROM deficits, address strength deficits, analyze and address soft tissue restrictions, analyze and cue movement patterns, analyze and modify body mechanics/ergonomics and assess and modify postural abnormalities to attain remaining goals.      []  See Plan of Care  []  See progress note/recertification  []  See Discharge Summary         Progress towards goals / Updated goals:  Updated Goals: to be achieved in 4 weeks:                         Long Term Goals: To be accomplished in 4 weeks:                        TUQKUPH will increase strength to 5/5 throughout B LEs to aid in return to sports.                        Status at IE:4/5                        Current: 4/5 in ext/flex, progressing                                       DSRDCHQ will demonstrate AROM of  degrees to aid in completion of ADLs.                       MIEKIX at IE:                          Current: 7-110* AROM progressing                             Patient will report pain less than 1-2/10 average to aid in completion of ADLs.                       BTUMWW at IE:8 at worst                        Current: 4/10 at worst, progressing                            Patient will perform 10 squats with 20lbs with good form/technique to aid in completion of ADLs and return to work                        Status at IE: Pain with sit<>stand, uses UEs                        Current: currently BW squats without weight, progressing                            Patient will improve FOTO to 55 points overall to demonstrate improvement in functional ability.                         TAKVQO at IE:33                        Current:43/100, progressing       PLAN  [x]  Upgrade activities as tolerated     [x]  Continue plan of care  []  Update interventions per flow sheet       []  Discharge due to:_  []  Other:_      Garcia Jordan 3/27/2018  9:52 AM    Future Appointments  Date Time Provider Angel Lopez   3/29/2018 9:00 AM Dimas Dwyer [facility]   4/3/2018 10:00 AM Dimas Dwyer [facility]   4/5/2018 9:30 AM Dimas Dwyer PT [facility]   4/10/2018 10:00 AM Dimas Dwyer [facility]   4/12/2018 9:30 AM Dimas Dwyer [facility]

## 2018-03-29 ENCOUNTER — HOSPITAL ENCOUNTER (OUTPATIENT)
Dept: PHYSICAL THERAPY | Age: 63
Discharge: HOME | End: 2018-03-29
Payer: COMMERCIAL

## 2018-03-29 PROCEDURE — 97110 THERAPEUTIC EXERCISES: CPT | Performed by: PHYSICAL THERAPIST

## 2018-03-29 PROCEDURE — 97016 VASOPNEUMATIC DEVICE THERAPY: CPT | Performed by: PHYSICAL THERAPIST

## 2018-03-29 PROCEDURE — 97530 THERAPEUTIC ACTIVITIES: CPT | Performed by: PHYSICAL THERAPIST

## 2018-03-29 NOTE — PROGRESS NOTES
PT DAILY TREATMENT NOTE 1216    Patient Name: Navdeep Mortensen  Date:3/29/2018  : 1955  [x]  Patient  Verified  Payor: Delilah Suh / Plan: Adeel Howell / Product Type: PPO /    In time:9:05  Out time:9:51  Total Treatment Time (min): 46  Visit #: 16 of 20    Treatment Area: right knee pain    SUBJECTIVE  Pain Level (0-10 scale): 2  Any medication changes, allergies to medications, adverse drug reactions, diagnosis change, or new procedure performed?: [x] No    [] Yes (see summary sheet for update)  Subjective functional status/changes:   [] No changes reported  Is worried about going back to work in the middle of next month. OBJECTIVE    Modality rationale: decrease edema, decrease inflammation and decrease pain to improve the patients ability to complete ADLs   Min Type Additional Details   10 [x]  Vasopneumatic Device Pressure:       [] lo [] med [] hi   Temperature: [] lo [] med [] hi   [] Skin assessment post-treatment:  []intact []redness- no adverse reaction    []redness - adverse reaction:     25 min Therapeutic Exercise:  [x] See flow sheet :   Rationale: increase ROM, increase strength and decrease pain to improve the patients ability to complete ADLs    10 min Therapeutic Activity:  [x]  See flow sheet :   Rationale: increase ROM, increase strength and decrease pain and improve coordination to improve the patients ability to complete ADLs             With   [] TE   [] TA   [] neuro   [] other: Patient Education: [x] Review HEP    [] Progressed/Changed HEP based on:   [] positioning   [] body mechanics   [] transfers   [] heat/ice application    [] other:      Other Objective/Functional Measures:      Pain Level (0-10 scale) post treatment: 1    ASSESSMENT/Changes in Function: Patient responds well to treatment session. Patient continues to have pain and worries about her return to work. Educated on her progress throughout therapy.  Instructed to attempt a mindfulness john ([employer]), she was receptive. Will f/u with this next session. No adverse effects were noted from today's treatment session      Patient will continue to benefit from skilled PT services to modify and progress therapeutic interventions, address functional mobility deficits, address ROM deficits, address strength deficits, analyze and address soft tissue restrictions, analyze and cue movement patterns, analyze and modify body mechanics/ergonomics and assess and modify postural abnormalities to attain remaining goals. []  See Plan of Care  []  See progress note/recertification  []  See Discharge Summary         Progress towards goals / Updated goals:  Updated Goals: to be achieved in 4 weeks:                         Long Term Goals: To be accomplished in 4 weeks:                        Patient will increase strength to 5/5 throughout B LEs to aid in return to sports.                        Status at IE:4/5                        Current: 4/5 in ext/flex, progressing                                       OHGHGES will demonstrate AROM of  degrees to aid in completion of ADLs.                       HPLPKS at IE:                          Current: 7-110* AROM progressing                             Patient will report pain less than 1-2/10 average to aid in completion of ADLs.                       LKXDRP at IE:8 at worst                        Current: 4/10 at worst, progressing                            Patient will perform 10 squats with 20lbs with good form/technique to aid in completion of ADLs and return to work                        Status at IE: Pain with sit<>stand, uses UEs                        Current: currently BW squats without weight, progressing                            Patient will improve FOTO to 55 points overall to demonstrate improvement in functional ability.                         LFEKEB at IE:33                        Current:43/100, progressing    PLAN  []  Upgrade activities as tolerated     [x]  Continue plan of care  []  Update interventions per flow sheet       []  Discharge due to:_  []  Other:_      Mylene Stephen PT, DPT 3/29/2018  8:08 AM    Future Appointments  Date Time Provider Angel Lopez   3/29/2018 9:00 AM Mylene Stephen PT [facility]   4/3/2018 10:00 AM Mylene Stephen PT [facility]   4/5/2018 9:30 AM Mylene Stephen PT [facility]   4/10/2018 10:00 AM Mylene Stephen PT [facility]   4/12/2018 9:30 AM Mylene Stephen PT [facility]

## 2018-04-03 ENCOUNTER — HOSPITAL ENCOUNTER (OUTPATIENT)
Dept: PHYSICAL THERAPY | Age: 63
Discharge: HOME | End: 2018-04-03
Payer: COMMERCIAL

## 2018-04-03 PROCEDURE — 97110 THERAPEUTIC EXERCISES: CPT | Performed by: PHYSICAL THERAPIST

## 2018-04-03 PROCEDURE — 97530 THERAPEUTIC ACTIVITIES: CPT | Performed by: PHYSICAL THERAPIST

## 2018-04-03 NOTE — PROGRESS NOTES
PT DAILY TREATMENT NOTE     Patient Name: Cristiane Ly  Date:4/3/2018  : 1955  [x]  Patient  Verified  Payor: Cristal Barajas / Plan: Chantal Royal / Product Type: PPO /    In time:10:00  Out time:10:50  Total Treatment Time (min): 50  Visit #: 75 of 20    Treatment Area: right knee pain    SUBJECTIVE  Pain Level (0-10 scale): 0  Any medication changes, allergies to medications, adverse drug reactions, diagnosis change, or new procedure performed?: [x] No    [] Yes (see summary sheet for update)  Subjective functional status/changes:   [] No changes reported  Feeling good. Has been walking without the cane. Exercising at home with resistance. Feels like its helping. The sensitivity has gone down a good amount. OBJECTIVE    Modality rationale: decrease pain to improve the patients ability to complete ADLs   Min Type Additional Details   10 [x]  Ice     []  heat  []  Ice massage  []  Laser   []  Anodyne Position: supine  Location:R knee       30 min Therapeutic Exercise:  [x] See flow sheet :   Rationale: increase ROM, increase strength and decrease pain to improve the patients ability to complete ADLs    10 min Therapeutic Activity:  [x]  See flow sheet :   Rationale: increase ROM, increase strength and improve coordination  to improve the patients ability to complete ADLs           With   [] TE   [] TA   [] neuro   [] other: Patient Education: [x] Review HEP    [] Progressed/Changed HEP based on:   [] positioning   [] body mechanics   [] transfers   [] heat/ice application    [] other:         Pain Level (0-10 scale) post treatment: 1    ASSESSMENT/Changes in Function: Patient responds well to treatment session. Patient appears to be progressing well. Was unable to f/u regarding the mindfulness approach, however the patient is subjectively otherwise improved. Progress as tolerated .  No adverse effects were noted from today's treatment session      Patient will continue to benefit from skilled PT services to modify and progress therapeutic interventions, address functional mobility deficits, address ROM deficits, address strength deficits, analyze and address soft tissue restrictions, analyze and cue movement patterns, analyze and modify body mechanics/ergonomics and assess and modify postural abnormalities to attain remaining goals. []  See Plan of Care  []  See progress note/recertification  []  See Discharge Summary         Progress towards goals / Updated goals:  Updated Goals: to be achieved in 4 weeks:                         Long Term Goals: To be accomplished in 4 weeks:                        Patient will increase strength to 5/5 throughout B LEs to aid in return to sports.                        Status at IE:4/5                        Current: 4/5 in ext/flex, progressing                                       XDPOBPC will demonstrate AROM of  degrees to aid in completion of ADLs.                       RCJGVM at IE:                          Current: 7-110* AROM progressing                             Patient will report pain less than 1-2/10 average to aid in completion of ADLs.                       NSYELO at IE:8 at worst                        Current: 4/10 at worst, progressing                            Patient will perform 10 squats with 20lbs with good form/technique to aid in completion of ADLs and return to work                        Status at IE: Pain with sit<>stand, uses UEs                        Current: currently BW squats without weight, progressing                            Patient will improve FOTO to 55 points overall to demonstrate improvement in functional ability.                         ZDVAMT at IE:33                        Current:43/100, progressing    PLAN  []  Upgrade activities as tolerated     [x]  Continue plan of care  []  Update interventions per flow sheet       []  Discharge due to:_  []  Other:_      Lelia Sit, PT, DPT 4/3/2018  1:04 PM    Future Appointments  Date Time Provider Angel Lopez   4/5/2018 9:30 AM Benito Frazier, [facility]   4/10/2018 10:00 AM Benito Frazier, [facility]   4/12/2018 9:30 AM Benito Frazier, [facility]

## 2018-04-05 ENCOUNTER — HOSPITAL ENCOUNTER (OUTPATIENT)
Dept: PHYSICAL THERAPY | Age: 63
Discharge: HOME | End: 2018-04-05
Payer: COMMERCIAL

## 2018-04-05 PROCEDURE — 97530 THERAPEUTIC ACTIVITIES: CPT | Performed by: PHYSICAL THERAPIST

## 2018-04-05 PROCEDURE — 97110 THERAPEUTIC EXERCISES: CPT | Performed by: PHYSICAL THERAPIST

## 2018-04-05 PROCEDURE — 97016 VASOPNEUMATIC DEVICE THERAPY: CPT | Performed by: PHYSICAL THERAPIST

## 2018-04-05 NOTE — PROGRESS NOTES
PT DAILY TREATMENT NOTE     Patient Name: Cara Cervantes  Date:2018  : 1955  [x]  Patient  Verified  Payor: Ger Laird / Plan: Kathy Mckeon / Product Type: PPO /    In time:9:25  Out time:10:22  Total Treatment Time (min): 48  Visit #: 18 of 20    Treatment Area: right knee pain    SUBJECTIVE  Pain Level (0-10 scale): 1  Any medication changes, allergies to medications, adverse drug reactions, diagnosis change, or new procedure performed?: [x] No    [] Yes (see summary sheet for update)  Subjective functional status/changes:   [] No changes reported  Feels alright. Didn't sleep well last night. Has been having a bit more sensitivity today. OBJECTIVE    Modality rationale: decrease edema, decrease inflammation and decrease pain to improve the patients ability to complete ADLs   Min Type Additional Details   10 -  Vasopneumatic Device Pressure:       X- lo - med - hi   Temperature: - Xlo - med - hi   - Skin assessment post-treatment:  -intact -redness- no adverse reaction    -redness - adverse reaction:     33 min Therapeutic Exercise:  [x] See flow sheet :   Rationale: increase ROM, increase strength and decrease pain to improve the patients ability to complete ADLs    10 min Therapeutic Activity:  [x]  See flow sheet :   Rationale: increase ROM, increase strength and improve coordination  to improve the patients ability to complete ADLs            With   [] TE   [] TA   [] neuro   [] other: Patient Education: [x] Review HEP    [] Progressed/Changed HEP based on:   [] positioning   [] body mechanics   [] transfers   [] heat/ice application    [] other:         Pain Level (0-10 scale) post treatment: 1    ASSESSMENT/Changes in Function: Patient responds well to treatment session. Educated patient on normal variability in pain states. She was receptive. Instructed to attempt mindfulness john again. She was receptive.  . No adverse effects were noted from today's treatment session      Patient will continue to benefit from skilled PT services to modify and progress therapeutic interventions, address functional mobility deficits, address ROM deficits, address strength deficits, analyze and address soft tissue restrictions, analyze and cue movement patterns, analyze and modify body mechanics/ergonomics and assess and modify postural abnormalities to attain remaining goals. []  See Plan of Care  []  See progress note/recertification  []  See Discharge Summary         Progress towards goals / Updated goals:  Updated Goals: to be achieved in 4 weeks:                         Long Term Goals: To be accomplished in 4 weeks:                        Patient will increase strength to 5/5 throughout B LEs to aid in return to sports.                        Status at IE:4/5                        Current: 4/5 in ext/flex, progressing                                       VQIVBXG will demonstrate AROM of  degrees to aid in completion of ADLs.                       JFVZBB at IE:                          Current: 7-110* AROM progressing                             Patient will report pain less than 1-2/10 average to aid in completion of ADLs.                       AJOYRJ at IE:8 at worst                        Current: 4/10 at worst, progressing                            Patient will perform 10 squats with 20lbs with good form/technique to aid in completion of ADLs and return to work                        Status at IE: Pain with sit<>stand, uses UEs                        Current: currently BW squats without weight, progressing                            Patient will improve FOTO to 55 points overall to demonstrate improvement in functional ability.                         WDRZIE at IE:33                        Current:43/100, progressing    PLAN  []  Upgrade activities as tolerated     [x]  Continue plan of care  []  Update interventions per flow sheet       []  Discharge due to:_  []  Other:_      Fiordaliza Chan PT, DPT 4/5/2018  9:51 AM    Future Appointments  Date Time Provider Angel Lopez   4/10/2018 10:00 AM Fiordaliza Chan PT [facility]   4/12/2018 9:30 AM Fiordaliza Chan PT [facility]

## 2018-04-10 ENCOUNTER — APPOINTMENT (OUTPATIENT)
Dept: PHYSICAL THERAPY | Age: 63
End: 2018-04-10
Payer: COMMERCIAL

## 2018-04-11 ENCOUNTER — HOSPITAL ENCOUNTER (OUTPATIENT)
Dept: PHYSICAL THERAPY | Age: 63
Discharge: HOME | End: 2018-04-11
Payer: COMMERCIAL

## 2018-04-11 PROCEDURE — 97016 VASOPNEUMATIC DEVICE THERAPY: CPT | Performed by: PHYSICAL THERAPIST

## 2018-04-11 PROCEDURE — 97110 THERAPEUTIC EXERCISES: CPT | Performed by: PHYSICAL THERAPIST

## 2018-04-11 PROCEDURE — 97530 THERAPEUTIC ACTIVITIES: CPT | Performed by: PHYSICAL THERAPIST

## 2018-04-11 NOTE — PROGRESS NOTES
PT DAILY TREATMENT NOTE     Patient Name: Patricia Stewart  Date:2018  : 1955  [x]  Patient  Verified  Payor: Vernon Madera / Plan: Jayme Alegre / Product Type: PPO /    In time:9:35  Out time:10:40  Total Treatment Time (min): 65  Visit #: 23 of 20    Treatment Area: right knee pain    SUBJECTIVE  Pain Level (0-10 scale): 3.5  Any medication changes, allergies to medications, adverse drug reactions, diagnosis change, or new procedure performed?: [x] No    [] Yes (see summary sheet for update)  Subjective functional status/changes:   [] No changes reported  Feels alright. Feels like the sensitivity is trying to rev up again    OBJECTIVE    Modality rationale: decrease edema, decrease inflammation and decrease pain to improve the patients ability to complete ADLs   Min Type Additional Details   10 [x]  Vasopneumatic Device Pressure:       [x] lo [] med [] hi   Temperature: [x] lo [] med [] hi   [] Skin assessment post-treatment:  []intact []redness- no adverse reaction    []redness - adverse reaction:     30 min Therapeutic Exercise:  [x] See flow sheet :   Rationale: increase ROM, increase strength and decrease pain to improve the patients ability to complete ADLs    25 min Therapeutic Activity:  [x]  See flow sheet :   Rationale: increase ROM, increase strength and improve coordination  to improve the patients ability to complete ADLs             With   [] TE   [] TA   [] neuro   [] other: Patient Education: [x] Review HEP    [] Progressed/Changed HEP based on:   [] positioning   [] body mechanics   [] transfers   [] heat/ice application    [] other:        Pain Level (0-10 scale) post treatment: 1    ASSESSMENT/Changes in Function: Patient responds well to treatment session. Patient is making good progress. Will re-evaluate next session.  No adverse effects were noted from today's treatment session      Patient will continue to benefit from skilled PT services to modify and progress therapeutic interventions, address functional mobility deficits, address ROM deficits, address strength deficits, analyze and address soft tissue restrictions, analyze and cue movement patterns, analyze and modify body mechanics/ergonomics and assess and modify postural abnormalities to attain remaining goals. []  See Plan of Care  []  See progress note/recertification  []  See Discharge Summary         Progress towards goals / Updated goals:  Updated Goals: to be achieved in 4 weeks:                         Long Term Goals: To be accomplished in 4 weeks:                        Patient will increase strength to 5/5 throughout B LEs to aid in return to sports.                        Status at IE:4/5                        Current: 4/5 in ext/flex, progressing                                       YUJGWJV will demonstrate AROM of  degrees to aid in completion of ADLs.                       HDVYRH at IE:                          Current: 7-110* AROM progressing                             Patient will report pain less than 1-2/10 average to aid in completion of ADLs.                       EPWNDS at IE:8 at worst                        Current: 4/10 at worst, progressing                            Patient will perform 10 squats with 20lbs with good form/technique to aid in completion of ADLs and return to work                        Status at IE: Pain with sit<>stand, uses UEs                        Current: currently BW squats without weight, progressing                            Patient will improve FOTO to 55 points overall to demonstrate improvement in functional ability.                         CZYOBS at IE:33                        Current:43/100, progressing    PLAN  []  Upgrade activities as tolerated     [x]  Continue plan of care  []  Update interventions per flow sheet       []  Discharge due to:_  []  Other:_      Enio Aragon, PT, DPT 4/11/2018  9:55 AM    Future Appointments  Date Time Provider Angel Lopez   4/12/2018 8:30 AM Soco Stewart PT [facility]   4/12/2018 9:30 AM Soco Stewart PT [facility]

## 2018-04-12 ENCOUNTER — HOSPITAL ENCOUNTER (OUTPATIENT)
Dept: PHYSICAL THERAPY | Age: 63
Discharge: HOME | End: 2018-04-12
Payer: COMMERCIAL

## 2018-04-12 ENCOUNTER — APPOINTMENT (OUTPATIENT)
Dept: PHYSICAL THERAPY | Age: 63
End: 2018-04-12
Payer: COMMERCIAL

## 2018-04-12 PROCEDURE — 97530 THERAPEUTIC ACTIVITIES: CPT | Performed by: PHYSICAL THERAPIST

## 2018-04-12 PROCEDURE — 97110 THERAPEUTIC EXERCISES: CPT | Performed by: PHYSICAL THERAPIST

## 2018-04-12 NOTE — PROGRESS NOTES
[facility] at [address], [address]  Ph [phone]  Fx [phone]    Physical Therapy Discharge Summary    Patient name: Bonita Dai Start of Care: 2018   Referral source: Mckenzie Hunt MD : 1955   Medical/Treatment Diagnosis: right knee pain Onset Date:2018     Prior Hospitalization: see medical history Provider#: [id number]   Medications: Verified on Patient Summary List    Comorbidities: OA, tobacco use  Prior Level of Function:independent w/ ADLs, sedentary    Visits from Start of Care: 20    Missed Visits: 3    Reporting Period : 2018 to 2018    Summary of Care:  Updated Goals: to be achieved in 4 weeks:                         Long Term Goals: To be accomplished in 4 weeks:                        Patient will increase strength to 5/5 throughout B LEs to aid in return to sports.                        Status at IE:4/5                        Current: 5/5 Met 2018                            Patient will demonstrate AROM of  degrees to aid in completion of ADLs.                       ZYWAMH at IE:                          Current:8-116 AROM; Met 2018                            Patient will report pain less than 1-2/10 average to aid in completion of ADLs.                       MJHTAN at IE:8 at worst                        Current: Pain still 4 at worst; not Met 2018                            Patient will perform 10 squats with 20lbs with good form/technique to aid in completion of ADLs and return to work                        Status at IE: Pain with sit<>stand, uses UEs                        Current: 10 squats w/ 20lbs; Met 2018                            Patient will improve FOTO to 55 points overall to demonstrate improvement in functional ability.                         KYNAZG at IE:33                        Current:63 Met 2018      ASSESSMENT/RECOMMENDATIONS:  Patient has made good progress to date. Meeting all goals. Continues to have some allodynia and disrupted sleep as a result, but is capable of continuing via independent HEP at this time.    [x]Discontinue therapy: [x]Patient has reached or is progressing toward set goals      []Patient is non-compliant or has abdicated      []Due to lack of appreciable progress towards set goals    Benito Frazier, PT, DPT 4/12/2018 11:20 AM

## 2018-04-12 NOTE — PROGRESS NOTES
PT DAILY TREATMENT NOTE     Patient Name: Cristiane Ly  Date:2018  : 1955  [x]  Patient  Verified  Payor: Cristal Barajas / Plan: Chantal Royal / Product Type: PPO /    In time:10:07  Out time:11:10  Total Treatment Time (min): 61  Visit #: 20 of 20    Treatment Area: right knee pain    SUBJECTIVE  Pain Level (0-10 scale): 3  Any medication changes, allergies to medications, adverse drug reactions, diagnosis change, or new procedure performed?: [x] No    [] Yes (see summary sheet for update)  Subjective functional status/changes:   [] No changes reported  Feels alright. Goes to work Monday. Feels ready. OBJECTIVE    25 min Therapeutic Exercise:  [x] See flow sheet :   Rationale: increase ROM, increase strength and decrease pain to improve the patients ability to complete ADLs    38 min Therapeutic Activity:  [x]  See flow sheet :   Rationale: increase ROM, increase strength and improve coordination  to improve the patients ability to complete ADLs        Access Code: [email or web address]   URL: [email or web address]   Date: 2018   Prepared by: Wood Jay     Exercises   Full Leg Press - 10 reps - 2 sets - 3x weekly   Hamstring Curl with Weight Machine - 10 reps - 2 sets - 3x weekly   Knee Extension with Weight Machine - 10 reps - 2 sets - 3x weekly   Goblet Squat with Kettlebell - 10 reps - 2 sets - 3x weekly   Supine Bridge - 10 reps - 2 sets - 3x weekly   Hip Abduction with Resistance Loop - 10 reps - 2 sets - 3x weekly   Standing Hip Extension Kicks - 10 reps - 2 sets - 3x weekly   Standing Hip Flexion with Resistance Loop - 10 reps - 2 sets - 3x weekly      With   [] TE   [] TA   [] neuro   [] other: Patient Education: [x] Review HEP    [] Progressed/Changed HEP based on:   [] positioning   [] body mechanics   [] transfers   [] heat/ice application    [] other:        Pain Level (0-10 scale) post treatment: 1/10    ASSESSMENT/Changes in Function: Patient responds well to treatment session. Patient has made good progress to date. Meeting all goals. Continues to have some allodynia and disrupted sleep as a result, but is capable of continuing via independent HEP at this time. No adverse effects were noted from today's treatment session      Patient will continue to benefit from skilled PT services to modify and progress therapeutic interventions, address functional mobility deficits, address ROM deficits, address strength deficits, analyze and address soft tissue restrictions, analyze and cue movement patterns, analyze and modify body mechanics/ergonomics and assess and modify postural abnormalities to attain remaining goals. []  See Plan of Care  []  See progress note/recertification  [x]  See Discharge Summary         Progress towards goals / Updated goals:  Updated Goals: to be achieved in 4 weeks:                         Long Term Goals: To be accomplished in 4 weeks:                        TYVNAQW will increase strength to 5/5 throughout B LEs to aid in return to sports.                        Status at IE:4/5                        Current: 5/5 Met 4/12/2018                            Patient will demonstrate AROM of  degrees to aid in completion of ADLs.                       XHJUFL at IE:                          Current:8-116 AROM; Met 4/12/2018                            Patient will report pain less than 1-2/10 average to aid in completion of ADLs.                       XZTHIZ at IE:8 at worst                        Current: Pain still 4 at worst; not Met 4/12/2018                            Patient will perform 10 squats with 20lbs with good form/technique to aid in completion of ADLs and return to work                        Status at IE: Pain with sit<>stand, uses UEs                        Current: 10 squats w/ 20lbs;  Met 4/12/2018                            Patient will improve FOTO to 55 points overall to demonstrate improvement in functional ability.                         DDDITM at IE:33                        Current:63 Met 4/12/2018    PLAN  []  Upgrade activities as tolerated     []  Continue plan of care  []  Update interventions per flow sheet       [x]  Discharge due to:_ meeting goals  []  Other:_      Patrick Hicks PT, DPT 4/12/2018  10:11 AM    Future Appointments  Date Time Provider Angel Lopez   4/16/2018 5:30 PM Patrick Hicks PT [facility]   4/19/2018 3:30  W Steve [facility]

## 2018-04-16 ENCOUNTER — APPOINTMENT (OUTPATIENT)
Dept: PHYSICAL THERAPY | Age: 63
End: 2018-04-16
Payer: COMMERCIAL

## 2018-04-19 ENCOUNTER — APPOINTMENT (OUTPATIENT)
Dept: PHYSICAL THERAPY | Age: 63
End: 2018-04-19
Payer: COMMERCIAL

## 2018-11-28 ENCOUNTER — HOSPITAL ENCOUNTER (OUTPATIENT)
Dept: PREADMISSION TESTING | Age: 63
Discharge: HOME | End: 2018-11-28
Payer: COMMERCIAL

## 2018-11-28 DIAGNOSIS — M67.431 GANGLION OF RIGHT WRIST: ICD-10-CM

## 2018-11-28 LAB
ALBUMIN SERPL-MCNC: 3.8 G/DL (ref 3.4–5)
ALBUMIN/GLOB SERPL: 1 {RATIO} (ref 0.8–1.7)
ALP SERPL-CCNC: 99 U/L (ref 45–117)
ALT SERPL-CCNC: 27 U/L (ref 13–56)
ANION GAP SERPL CALC-SCNC: 8 MMOL/L (ref 3–18)
AST SERPL-CCNC: 19 U/L (ref 15–37)
BILIRUB SERPL-MCNC: 0.3 MG/DL (ref 0.2–1)
BUN SERPL-MCNC: 13 MG/DL (ref 7–18)
BUN/CREAT SERPL: 15
CALCIUM SERPL-MCNC: 9.6 MG/DL (ref 8.5–10.1)
CHLORIDE SERPL-SCNC: 105 MMOL/L (ref 100–108)
CO2 SERPL-SCNC: 29 MMOL/L (ref 21–32)
CREAT SERPL-MCNC: 0.87 MG/DL (ref 0.6–1.3)
ERYTHROCYTE [DISTWIDTH] IN BLOOD BY AUTOMATED COUNT: 14.5 % (ref 11.6–14.5)
GLOBULIN SER CALC-MCNC: 3.7 G/DL (ref 2–4)
GLUCOSE SERPL-MCNC: 71 MG/DL (ref 74–99)
HCT VFR BLD AUTO: 40.6 % (ref 35–45)
HGB BLD-MCNC: 13.1 G/DL (ref 12–16)
MCH RBC QN AUTO: 28.4 PG (ref 24–34)
MCHC RBC AUTO-ENTMCNC: 32.3 G/DL (ref 31–37)
MCV RBC AUTO: 87.9 FL (ref 74–97)
PLATELET # BLD AUTO: 317 K/UL (ref 135–420)
PMV BLD AUTO: 8.9 FL (ref 9.2–11.8)
POTASSIUM SERPL-SCNC: 4.2 MMOL/L (ref 3.5–5.5)
PROT SERPL-MCNC: 7.5 G/DL (ref 6.4–8.2)
RBC # BLD AUTO: 4.62 M/UL (ref 4.2–5.3)
SODIUM SERPL-SCNC: 142 MMOL/L (ref 136–145)
WBC # BLD AUTO: 4.9 K/UL (ref 4.6–13.2)

## 2018-11-28 PROCEDURE — 36415 COLL VENOUS BLD VENIPUNCTURE: CPT

## 2018-11-28 PROCEDURE — 85027 COMPLETE CBC AUTOMATED: CPT

## 2018-11-28 PROCEDURE — 80053 COMPREHEN METABOLIC PANEL: CPT

## 2018-11-28 PROCEDURE — 93005 ELECTROCARDIOGRAM TRACING: CPT

## 2018-11-29 LAB
ATRIAL RATE: 56 BPM
CALCULATED P AXIS, ECG09: 70 DEGREES
CALCULATED R AXIS, ECG10: 18 DEGREES
CALCULATED T AXIS, ECG11: 41 DEGREES
DIAGNOSIS, 93000: NORMAL
P-R INTERVAL, ECG05: 168 MS
Q-T INTERVAL, ECG07: 414 MS
QRS DURATION, ECG06: 82 MS
QTC CALCULATION (BEZET), ECG08: 399 MS
VENTRICULAR RATE, ECG03: 56 BPM

## 2018-12-17 PROBLEM — G56.01 CARPAL TUNNEL SYNDROME OF RIGHT WRIST: Chronic | Status: ACTIVE | Noted: 2018-12-17

## 2018-12-17 PROBLEM — M67.40 GANGLION CYST: Chronic | Status: ACTIVE | Noted: 2018-12-17

## 2018-12-17 NOTE — H&P
History and Physical        Patient: Harley Benz               Sex: female          DOA: (Not on file)         YOB: 1955      Age:  66 y.o.        LOS:  LOS: 0 days        HPI:     Je Cedillo is in for her right hand carpal tunnel syndrome that is seen by EMG/right first dorsal compartment ganglion cyst of tendon sheath. The patient is in for followup. The patient's EMGs do show the carpal tunnel syndrome of both hands with the right being worse than the left. The patient's injection in the ganglion tendon sheath helped with the pain, but the cyst is still present. Her EMGs do show carpal tunnel syndrome in both hands. There is a question of right cubital tunnel syndrome, but that does not correspond with any of her symptomatology. AP, lateral, and oblique views of the right hand were obtained and interpreted in the office and reveal some [facility] joint DJD; otherwise, no periosteal reaction, no medullary lesions, no osteopenia, well-aligned joint spaces, no chondrolysis, and no fractures. Past Medical History:   Diagnosis Date    OA (osteoarthritis)     right knee    Osteoarthritis of right knee 1/7/2018       Past Surgical History:   Procedure Laterality Date    COLONOSCOPY N/A 10/9/2017    COLONOSCOPY performed by Yossi Shea MD at [address] Right 01/2018    HX ORTHOPAEDIC Left 06/30/2016    carpal tunnel release    HX ORTHOPAEDIC Right     foot surgery    HX TUBAL LIGATION         No family history on file.     Social History     Socioeconomic History    Marital status:      Spouse name: Not on file    Number of children: Not on file    Years of education: Not on file    Highest education level: Not on file   Tobacco Use    Smoking status: Current Every Day Smoker     Packs/day: 0.25     Years: 10.00     Pack years: 2.50    Smokeless tobacco: Never Used    Tobacco comment: no smoking 24 hours prior to surgery   Substance and Sexual Activity    Alcohol use: Yes     Alcohol/week: 1.2 oz     Types: 2 Glasses of wine per week    Drug use: No       Prior to Admission medications    Medication Sig Start Date End Date Taking? Authorizing Provider   diclofenac (VOLTAREN XR) 100 mg ER tablet Take 100 mg by mouth every other day. Provider, Historical   acetaminophen (TYLENOL EXTRA STRENGTH) 500 mg tablet Take 1,000 mg by mouth every six (6) hours as needed for Pain. Provider, Historical   acetaminophen (TYLENOL ARTHRITIS PAIN) 650 mg TbER Take 650 mg by mouth every eight (8) hours. Provider, Historical       Allergies   Allergen Reactions    Ultram [Tramadol] Itching     Nervousness, can't sleep       Review of Systems    Pertinent positives include dizziness, headache, ringing in ears, loss of balance and numbness/tingling. Pertinent negatives include blurred vision, chest pain, chills, cold, discharge of the eyes, double vision, fever, hearing loss, heart murmur, itching of the eyes, palpitations, redness of the eyes, rheumatic fever, sore throat/hoarseness, weight change, abdominal pain, anxiety, bipolar disorder, bladder/kidney infection, bloody stool, blood in urine, burning sensation, changes in mood, chronic cough, depression, diarrhea, difficulty breathing, difficulty swallowing, fainting, frequent urinating, fracture/dislocation, gas/bloating, gout, hemorrhoids, incontinence, joint pain, joint stiffness, memory loss, muscle weakness, nausea/vomiting, pain on breathing, painful urination, psoriasis, rash/itching, Raynaud's phenomenon, rheumatoid disease, seizure disorder, shortness of breath, sprain/strain, swelling of feet, tendonitis, varicose veins and wheezing. Physical Exam:      There were no vitals taken for this visit. Physical Exam:   Physical examination shows that the patients right wrist demonstrates a palpable and moveable ganglion in the 1st dorsal compartment. It is quite fluctuant.   There is no evidence of infection. She has a positive direct carpal compression test and a positive Tinel sign. She has some decrease to light touch sensation in the median nerve distribution. The patient has normal motion in all six directions. The patient has a negative Phalen test.  The patient has a negative Finkelstein maneuver. The patient has good capillary refill. The patient has good  strength of the hand with normal thenar eminence tone. Assessment/Plan     Principal Problem:    Carpal tunnel syndrome of right wrist (12/17/2018)    Active Problems:    Ganglion cyst (12/17/2018)    Dr. Debi Ortiz scheduled her for a right endoscopic carpal tunnel release and a first dorsal compartment ganglion of tendon sheath excision. There is a small cyst that is associated with the flexor carpi radialis. We are going to see if we can reach through there through the longitudinal incision of this ganglion excision to see if we can get this cyst off of her sheath. She understands the risks including infection, pain, bleeding, and swelling, and is willing to proceed. Dr. Debi Ortiz told her that he needs for her to be really faithful for the next couple of day to keep it elevated. He asked her to follow up again one week postop. She will likely be out of work between seven and ten days. He has given her Norco for postoperative pain use without narcotics.

## 2018-12-26 ENCOUNTER — ANESTHESIA EVENT (OUTPATIENT)
Dept: SURGERY | Age: 63
End: 2018-12-26
Payer: COMMERCIAL

## 2018-12-26 RX ORDER — SODIUM CHLORIDE 0.9 % (FLUSH) 0.9 %
5-10 SYRINGE (ML) INJECTION EVERY 8 HOURS
Status: CANCELLED | OUTPATIENT
Start: 2018-12-26

## 2018-12-26 RX ORDER — SODIUM CHLORIDE 0.9 % (FLUSH) 0.9 %
5-10 SYRINGE (ML) INJECTION AS NEEDED
Status: CANCELLED | OUTPATIENT
Start: 2018-12-26

## 2018-12-27 ENCOUNTER — ANESTHESIA (OUTPATIENT)
Dept: SURGERY | Age: 63
End: 2018-12-27
Payer: COMMERCIAL

## 2018-12-27 ENCOUNTER — HOSPITAL ENCOUNTER (OUTPATIENT)
Age: 63
Setting detail: OUTPATIENT SURGERY
Discharge: HOME | End: 2018-12-27
Attending: ORTHOPAEDIC SURGERY | Admitting: ORTHOPAEDIC SURGERY
Payer: COMMERCIAL

## 2018-12-27 VITALS
HEIGHT: 63 IN | TEMPERATURE: 97.2 F | WEIGHT: 212 LBS | HEART RATE: 69 BPM | OXYGEN SATURATION: 96 % | DIASTOLIC BLOOD PRESSURE: 86 MMHG | BODY MASS INDEX: 37.56 KG/M2 | SYSTOLIC BLOOD PRESSURE: 121 MMHG | RESPIRATION RATE: 16 BRPM

## 2018-12-27 PROCEDURE — 74011250636 HC RX REV CODE- 250/636: Performed by: PHYSICIAN ASSISTANT

## 2018-12-27 PROCEDURE — 76210000063 HC OR PH I REC FIRST 0.5 HR: Performed by: ORTHOPAEDIC SURGERY

## 2018-12-27 PROCEDURE — 77030009770 HC INSTRU CRPL SET CNMD -C: Performed by: ORTHOPAEDIC SURGERY

## 2018-12-27 PROCEDURE — 74011250636 HC RX REV CODE- 250/636

## 2018-12-27 PROCEDURE — 77030012508 HC MSK AIRWY LMA AMBU -A: Performed by: SPECIALIST

## 2018-12-27 PROCEDURE — 88304 TISSUE EXAM BY PATHOLOGIST: CPT

## 2018-12-27 PROCEDURE — 74011000250 HC RX REV CODE- 250: Performed by: ORTHOPAEDIC SURGERY

## 2018-12-27 PROCEDURE — 76060000032 HC ANESTHESIA 0.5 TO 1 HR: Performed by: ORTHOPAEDIC SURGERY

## 2018-12-27 PROCEDURE — 74011250636 HC RX REV CODE- 250/636: Performed by: SPECIALIST

## 2018-12-27 PROCEDURE — 74011000250 HC RX REV CODE- 250

## 2018-12-27 PROCEDURE — 77030032490 HC SLV COMPR SCD KNE COVD -B: Performed by: ORTHOPAEDIC SURGERY

## 2018-12-27 PROCEDURE — 76210000021 HC REC RM PH II 0.5 TO 1 HR: Performed by: ORTHOPAEDIC SURGERY

## 2018-12-27 PROCEDURE — 77030020782 HC GWN BAIR PAWS FLX 3M -B: Performed by: ORTHOPAEDIC SURGERY

## 2018-12-27 PROCEDURE — 76010000138 HC OR TIME 0.5 TO 1 HR: Performed by: ORTHOPAEDIC SURGERY

## 2018-12-27 PROCEDURE — 77030000032 HC CUF TRNQT ZIMM -B: Performed by: ORTHOPAEDIC SURGERY

## 2018-12-27 RX ORDER — SODIUM CHLORIDE 9 MG/ML
125 INJECTION, SOLUTION INTRAVENOUS CONTINUOUS
Status: DISCONTINUED | OUTPATIENT
Start: 2018-12-27 | End: 2018-12-27 | Stop reason: HOSPADM

## 2018-12-27 RX ORDER — MAGNESIUM SULFATE 100 %
4 CRYSTALS MISCELLANEOUS AS NEEDED
Status: DISCONTINUED | OUTPATIENT
Start: 2018-12-27 | End: 2018-12-27 | Stop reason: HOSPADM

## 2018-12-27 RX ORDER — MIDAZOLAM HYDROCHLORIDE 1 MG/ML
INJECTION, SOLUTION INTRAMUSCULAR; INTRAVENOUS AS NEEDED
Status: DISCONTINUED | OUTPATIENT
Start: 2018-12-27 | End: 2018-12-27 | Stop reason: HOSPADM

## 2018-12-27 RX ORDER — GLYCOPYRROLATE 0.2 MG/ML
INJECTION INTRAMUSCULAR; INTRAVENOUS AS NEEDED
Status: DISCONTINUED | OUTPATIENT
Start: 2018-12-27 | End: 2018-12-27 | Stop reason: HOSPADM

## 2018-12-27 RX ORDER — SODIUM CHLORIDE, SODIUM LACTATE, POTASSIUM CHLORIDE, CALCIUM CHLORIDE 600; 310; 30; 20 MG/100ML; MG/100ML; MG/100ML; MG/100ML
125 INJECTION, SOLUTION INTRAVENOUS CONTINUOUS
Status: DISCONTINUED | OUTPATIENT
Start: 2018-12-27 | End: 2018-12-27 | Stop reason: HOSPADM

## 2018-12-27 RX ORDER — BUPIVACAINE HYDROCHLORIDE 2.5 MG/ML
INJECTION, SOLUTION EPIDURAL; INFILTRATION; INTRACAUDAL AS NEEDED
Status: DISCONTINUED | OUTPATIENT
Start: 2018-12-27 | End: 2018-12-27 | Stop reason: HOSPADM

## 2018-12-27 RX ORDER — ONDANSETRON 2 MG/ML
INJECTION INTRAMUSCULAR; INTRAVENOUS AS NEEDED
Status: DISCONTINUED | OUTPATIENT
Start: 2018-12-27 | End: 2018-12-27 | Stop reason: HOSPADM

## 2018-12-27 RX ORDER — DEXAMETHASONE SODIUM PHOSPHATE 4 MG/ML
INJECTION, SOLUTION INTRA-ARTICULAR; INTRALESIONAL; INTRAMUSCULAR; INTRAVENOUS; SOFT TISSUE AS NEEDED
Status: DISCONTINUED | OUTPATIENT
Start: 2018-12-27 | End: 2018-12-27 | Stop reason: HOSPADM

## 2018-12-27 RX ORDER — LIDOCAINE HYDROCHLORIDE 20 MG/ML
INJECTION, SOLUTION EPIDURAL; INFILTRATION; INTRACAUDAL; PERINEURAL AS NEEDED
Status: DISCONTINUED | OUTPATIENT
Start: 2018-12-27 | End: 2018-12-27 | Stop reason: HOSPADM

## 2018-12-27 RX ORDER — ONDANSETRON 2 MG/ML
4 INJECTION INTRAMUSCULAR; INTRAVENOUS ONCE
Status: DISCONTINUED | OUTPATIENT
Start: 2018-12-27 | End: 2018-12-27 | Stop reason: HOSPADM

## 2018-12-27 RX ORDER — FENTANYL CITRATE 50 UG/ML
25 INJECTION, SOLUTION INTRAMUSCULAR; INTRAVENOUS AS NEEDED
Status: DISCONTINUED | OUTPATIENT
Start: 2018-12-27 | End: 2018-12-27 | Stop reason: HOSPADM

## 2018-12-27 RX ORDER — PROPOFOL 10 MG/ML
INJECTION, EMULSION INTRAVENOUS AS NEEDED
Status: DISCONTINUED | OUTPATIENT
Start: 2018-12-27 | End: 2018-12-27 | Stop reason: HOSPADM

## 2018-12-27 RX ORDER — FENTANYL CITRATE 50 UG/ML
50 INJECTION, SOLUTION INTRAMUSCULAR; INTRAVENOUS
Status: DISCONTINUED | OUTPATIENT
Start: 2018-12-27 | End: 2018-12-27 | Stop reason: HOSPADM

## 2018-12-27 RX ORDER — FENTANYL CITRATE 50 UG/ML
INJECTION, SOLUTION INTRAMUSCULAR; INTRAVENOUS AS NEEDED
Status: DISCONTINUED | OUTPATIENT
Start: 2018-12-27 | End: 2018-12-27 | Stop reason: HOSPADM

## 2018-12-27 RX ORDER — DEXTROSE 50 % IN WATER (D50W) INTRAVENOUS SYRINGE
25-50 AS NEEDED
Status: DISCONTINUED | OUTPATIENT
Start: 2018-12-27 | End: 2018-12-27 | Stop reason: HOSPADM

## 2018-12-27 RX ORDER — NALOXONE HYDROCHLORIDE 0.4 MG/ML
0.1 INJECTION, SOLUTION INTRAMUSCULAR; INTRAVENOUS; SUBCUTANEOUS AS NEEDED
Status: DISCONTINUED | OUTPATIENT
Start: 2018-12-27 | End: 2018-12-27 | Stop reason: HOSPADM

## 2018-12-27 RX ADMIN — PROPOFOL 200 MG: 10 INJECTION, EMULSION INTRAVENOUS at 12:09

## 2018-12-27 RX ADMIN — MIDAZOLAM HYDROCHLORIDE 2 MG: 1 INJECTION, SOLUTION INTRAMUSCULAR; INTRAVENOUS at 11:58

## 2018-12-27 RX ADMIN — SODIUM CHLORIDE, SODIUM LACTATE, POTASSIUM CHLORIDE, AND CALCIUM CHLORIDE 125 ML/HR: 600; 310; 30; 20 INJECTION, SOLUTION INTRAVENOUS at 09:03

## 2018-12-27 RX ADMIN — DEXAMETHASONE SODIUM PHOSPHATE 4 MG: 4 INJECTION, SOLUTION INTRA-ARTICULAR; INTRALESIONAL; INTRAMUSCULAR; INTRAVENOUS; SOFT TISSUE at 12:11

## 2018-12-27 RX ADMIN — FENTANYL CITRATE 50 MCG: 50 INJECTION, SOLUTION INTRAMUSCULAR; INTRAVENOUS at 12:50

## 2018-12-27 RX ADMIN — ONDANSETRON 4 MG: 2 INJECTION INTRAMUSCULAR; INTRAVENOUS at 12:11

## 2018-12-27 RX ADMIN — LIDOCAINE HYDROCHLORIDE 80 MG: 20 INJECTION, SOLUTION EPIDURAL; INFILTRATION; INTRACAUDAL; PERINEURAL at 12:09

## 2018-12-27 RX ADMIN — FENTANYL CITRATE 100 MCG: 50 INJECTION, SOLUTION INTRAMUSCULAR; INTRAVENOUS at 12:00

## 2018-12-27 RX ADMIN — GLYCOPYRROLATE 0.2 MG: 0.2 INJECTION INTRAMUSCULAR; INTRAVENOUS at 11:58

## 2018-12-27 NOTE — PERIOP NOTES
Primary Nurse Orene Dancer, RN and Obed Wilkins RN performed a dual skin assessment on this patient     Reviewed PTA medication list with patient/caregiver and patient/caregiver denies any additional medications.  Patient admits to having a responsible adult care for them for at least 24 hours after surgery.

## 2018-12-27 NOTE — ANESTHESIA POSTPROCEDURE EVALUATION
Procedure(s):  RIGHT ENDOSCOPIC CARPAL TUNNEL RELEASE, RIGHT DORSAL GANGLION EXCISION AND RIGHT WRIST LIPOMA EXCISION. Anesthesia Post Evaluation        Comments: Post-Anesthesia Evaluation and Assessment    Cardiovascular Function/Vital Signs  /84   Pulse 71   Temp 36.3 °C (97.3 °F)   Resp 21   Ht 5' 3\" (1.6 m)   Wt 96.2 kg (212 lb)   SpO2 100%   BMI 37.55 kg/m²     Patient is status post Procedure(s):  RIGHT ENDOSCOPIC CARPAL TUNNEL RELEASE, RIGHT DORSAL GANGLION EXCISION AND RIGHT WRIST LIPOMA EXCISION. Nausea/Vomiting: Controlled. Postoperative hydration reviewed and adequate. Pain:  Pain Scale 1: FLACC (12/27/18 1258)  Pain Intensity 1: 0 (12/27/18 1258)   Managed. Neurological Status:   Neuro (WDL): Within Defined Limits (12/27/18 0839)   At baseline. Mental Status and Level of Consciousness: Arousable. Pulmonary Status:   O2 Device: Nasal cannula (12/27/18 1255)   Adequate oxygenation and airway patent. Complications related to anesthesia: None    Post-anesthesia assessment completed. No concerns. Patient has met all discharge requirements.     Signed By: Thierno Barrientos MD    December 27, 2018                     Visit Vitals  /84   Pulse 71   Temp 36.3 °C (97.3 °F)   Resp 21   Ht 5' 3\" (1.6 m)   Wt 96.2 kg (212 lb)   SpO2 100%   BMI 37.55 kg/m²

## 2018-12-27 NOTE — DISCHARGE INSTRUCTIONS
[facility], [place], MD Evelin Castanon PA-C    Upper Extremity Surgery  Discharge Instructions      Please take the time to review the following instructions before you leave the hospital and use them as guidelines during your recovery from surgery. If you have any questions you may contact my office at [phone]. Wound Care/Dressing Changes:    []   You may remove the bulky dressing two days after surgery. Once you remove this, no dressing is necessary if there is no drainage. [x]   You may change your dressing as needed. Beginning the 2 days after you are discharged from the hospital you should change your dressing daily. A big, bulky dressing isnt necessary as long as there is any drainage from the incisions. You can put a band-aid or a piece of gauze over each incision and wear an ACE bandage as needed for comfort and swelling. []   Dont remove your dressing or get them wet.  It isnt necessary to apply antibiotic ointment to your incisions. Sutures will be removed at your one week post-op visit. Staples (if you have them) are removed in two weeks. If you have steri-strips over your incision they will start to peel off in 7-10 days as you get them wet. They dont need to be removed prior to that. When they begin to peel off, you may remove them. They should all be removed by 14 days from your surgery. Showering/Bathing:    [x]   You may shower 2 days after your surgery. Your dressing may be removed for showering. You may get your incisions wet in the shower. Dont vigorously scrub your incisions. Apply a clean, dry dressing after you have dried your incisions. Do not take a bath or get into a swimming pool or Jacuzzi until the incisions are completely healed. This may take about 14 days. Do not soak your incision under water. Sling:    []   You are not required to wear your sling and should do so only as needed for comfort.  You have no restrictions with regards to the movement of your shoulder. Please push to achieve full range of motion as soon as possible. You may resume your normal daily activities immediately and return to work as soon as you feel appropriate.    []   Keep your arm in the immobilizer at all times except when showering and changing your clothes. When showering or changing, keep your arm at your side. Dont move it away from your body. []   Keep your arm in the immobilizer at all times except when showering, changing your clothes and doing the exercises shown to you by Dr. Lillie Mc or your physical therapist prior to your discharge from the hospital.  Keep your arm at your side when changing your clothes and showering. Dont move it away from your body. Ice/Elevation    Continue ice consistently for 48 hours after surgery. After 48 hours, you should ice your shoulder 3 times per day, for 20 minutes at a time for the next 5 days. After one week from surgery, you may use ice as needed for pain and swelling. Diet:    You may advance to your regular diet as tolerated. Medication:    1. You will be given a prescription for pain medication when you are discharged from the hospital.  Take the medication as needed according to the directions on the prescription bottle. Possible side effects of the medication include dizziness, headache, nausea, vomiting, constipation and urinary retention. If you experience any of these side effects call the office so that we can assist you in relieving them. Discontinue the use of the pain medication if you develop itching, rash, shortness of breath or difficulties swallowing. If these symptoms become severe or arent relieved by discontinuing the medication you should seek immediate medical attention. Refills of pain medication are authorized during office hours only (8 AM-5PM Mon. thru Fri.).    2. If you were prescribed Percoset/oxycodone or Dilaudid/hydromorphone you must have a written prescription. These medications legally cannot be called in to the pharmacy. 3. You may take over the counter Ibuprofen/Advil/Aleve between dosages of your pain medication if needed. Do not take Tylenol in addition to your pain medication as most of the pain medication already contains Tylenol. Do not exceed 3000mg of Tylenol per day. Ex: (hydrocodone 5/325g= 325mg of Tylenol)  4. You may resume the medication you were taking prior to surgery. Pain medication may change the effects of any antidepressant medication you are taking. If you have any questions about possible interactions between your regular medications and the pain medication you should consult the physician who prescribes your regular medications. Follow Up Appointment:  If you are unsure of your follow-up appointment date and time, please call [phone]. Please let our  know you are scheduling a post-op appointment. Most appointments should be between 7-14 days after your surgery. Physical Therapy:    []    If you already have a therapy appointment, please be sure to attend your sessions as scheduled. []   Physical Therapy will be discussed with you at your first follow-up appointment with Dr. Diandra Dejesus. You dont need to begin physical therapy prior to that.    []  Begin physical therapy with your Home Health Physical Therapy. This will be set up         for your before you leave the hospital.    [x]  You do not require Physical Therapy. Important Signs and Symptoms:    If any of the following signs and symptoms occurs, you should contact Dr. Diandra Dejesus office. Please be advised if a problem arises which you feel requires immediate medical attention or you are unable to contact Dr. Diandra Dejesus office you should seek immediate medical attention. Signs and symptoms to watch for include:    1.  A sudden increase in swelling and/or redness or warmth at the area your surgery was performed which isnt relieved by rest, ice, and elevation. 2. Oral temperature greater than 101 degrees for 12 hours or more which isnt relieved by an increase in fluid intake and taking two Tylenol every 4-6 hours. 3. Excessive drainage from your incisions, or drainage which hasnt stopped by 72 hours after your surgery. 4. Fever, chills, shortness of breath, chest pain, nausea, vomiting or other signs and symptoms which are of concern to you. DISCHARGE SUMMARY from Nurse    PATIENT INSTRUCTIONS:    After general anesthesia or intravenous sedation, for 24 hours or while taking prescription Narcotics:  · Limit your activities  · Do not drive and operate hazardous machinery  · Do not make important personal or business decisions  · Do  not drink alcoholic beverages  · If you have not urinated within 8 hours after discharge, please contact your surgeon on call. Report the following to your surgeon:  · Excessive pain, swelling, redness or odor of or around the surgical area  · Temperature over 100.5  · Nausea and vomiting lasting longer than 4 hours or if unable to take medications  · Any signs of decreased circulation or nerve impairment to extremity: change in color, persistent  numbness, tingling, coldness or increase pain  · Any questions    What to do at Home:  Recommended activity: Activity as tolerated and no driving for today,     If you experience any of the following symptoms fever, chills, uncontrollable pain or nausea, please follow up with  in 1 week. *  Please give a list of your current medications to your Primary Care Provider. *  Please update this list whenever your medications are discontinued, doses are      changed, or new medications (including over-the-counter products) are added. *  Please carry medication information at all times in case of emergency situations.     These are general instructions for a healthy lifestyle:    No smoking/ No tobacco products/ Avoid exposure to second hand smoke  Surgeon Shon Mckeon Warning:  Quitting smoking now greatly reduces serious risk to your health. Obesity, smoking, and sedentary lifestyle greatly increases your risk for illness    A healthy diet, regular physical exercise & weight monitoring are important for maintaining a healthy lifestyle    You may be retaining fluid if you have a history of heart failure or if you experience any of the following symptoms:  Weight gain of 3 pounds or more overnight or 5 pounds in a week, increased swelling in our hands or feet or shortness of breath while lying flat in bed. Please call your doctor as soon as you notice any of these symptoms; do not wait until your next office visit. Recognize signs and symptoms of STROKE:    F-face looks uneven    A-arms unable to move or move unevenly    S-speech slurred or non-existent    T-time-call 911 as soon as signs and symptoms begin-DO NOT go       Back to bed or wait to see if you get better-TIME IS BRAIN. Warning Signs of HEART ATTACK     Call 911 if you have these symptoms:   Chest discomfort. Most heart attacks involve discomfort in the center of the chest that lasts more than a few minutes, or that goes away and comes back. It can feel like uncomfortable pressure, squeezing, fullness, or pain.  Discomfort in other areas of the upper body. Symptoms can include pain or discomfort in one or both arms, the back, neck, jaw, or stomach.  Shortness of breath with or without chest discomfort.  Other signs may include breaking out in a cold sweat, nausea, or lightheadedness. Don't wait more than five minutes to call 911 - MINUTES MATTER! Fast action can save your life. Calling 911 is almost always the fastest way to get lifesaving treatment. Emergency Medical Services staff can begin treatment when they arrive -- up to an hour sooner than if someone gets to the hospital by car. The discharge information has been reviewed with the patient and caregiver.   The patient and caregiver verbalized understanding. Discharge medications reviewed with the patient and caregiver and appropriate educational materials and side effects teaching were provided.   ___________________________________________________________________________________________________________________________________    Patient armband removed and shredded

## 2018-12-27 NOTE — OP NOTES
[address] REPORT    Juvencio Ren  MR#: [id number]  : 1955  ACCOUNT #: [de-identified]   DATE OF SERVICE: 2018    PREOPERATIVE DIAGNOSES:  1. Right carpal tunnel syndrome. 2.  Right volar radial mass. POSTOPERATIVE DIAGNOSES:  1. Right carpal tunnel syndrome. 2.  Right radial mass/lipoma. 3.  Right volar ganglion of tendon sheath (flexor carpi radialis). PROCEDURES PERFORMED:  1. Right endoscopic carpal tunnel release. 2.  Right wrist radial lipoma and volar ganglion excision. SURGEON:  Micheal Darnell III, MD    ASSISTANT:  Daved Kayser, PA-C    ANESTHESIA:  General.    ESTIMATED BLOOD LOSS:  Minimal.    COMPLICATIONS:  None. SPECIMENS REMOVED:  Sent to Pathology. IMPLANTS:  none    TOURNIQUET TIME:  About 18 minutes at 250 mmHg. INDICATIONS:  A 42-year-old  female with known right carpal tunnel syndrome and a palpable mass right near the first dorsal compartment on the radial side of the wrist and a secondary smaller mass on the volar aspect of the flexor carpi radialis. The patient now presents for endoscopic carpal tunnel release and radial and volar mass excision. PROCEDURE:  The patient was brought into the operating theater. After adequate anesthesia and prepping and draping of the right wrist and hand, the limb was exsanguinated with an Esmarch bandage and the tourniquet inflated to 250 mmHg. A 1 cm transverse incision was then placed at the volar flexion wrist crease just ulnar to the palmaris longus. The incision was deepened to the antebrachial fascia, which was released the length of the wound proximally and approximately for an additional 1 cm. Using Linvate carpal tunnel release kit, the carpal tunnel was dilated in line with the fourth metacarpal to the distal extent of the transverse carpal ligament. The cannula was then inserted and kept on some proximal radial deviation with the wrist in extension. It was passed all the way to the cardinal line of Russell. The scope was then inserted with the wrist in slight extension and the undersurface of the transverse carpal ligament was seen easily from proximal to the distal fatty/adipose tissue at the end of the transverse carpal ligament. Using the [facility] from the [facility] and using the endoscope for visualization, the transverse carpal ligament was transected in 2 passes with good release of the ligament out the lateral view of the cannula. The nerve was never in harm's way. The cannula was then withdrawn and placed back in the carpal tunnel with none of the prerelease tension. This wound was then closed with Mastisol and half inch Steri-Strips, and this was anesthetized with 4-5 mL of 0.25% Marcaine without epinephrine. The ganglion that was on the volar part of the flexor carpi radialis and then the lipoma right near the anatomic snuffbox, a 2.5 cm longitudinal incision was made on the ulnar side of the lipoma mass in order to get through both of these through 1 incision. Incision was opened with pickups and tenotomies, and the lipoma was easily shelled out down to the tendinous structures. Once this was removed, the specimen was sent to the pathologist.  The attention was then directed to the volar part of the flexor carpi radialis, where I dissected towards the mass. There was a ganglion cyst that was part of the tendon sheath of the FCR. This was about 6 or 7 mm in diameter, and I dissected it completely free and resected part of the sheath of the FCR. Once this was done, there were no further mucinoid collections. The wound was then irrigated out and closed with inverted 3-0 Monocryl. We then had Mastisol and half inch Steri-Strips applied here. Additional Marcaine without epinephrine was injected in this region.   This was then dressed with 4 x 4's, cast padding, and an Ace wrap, and the patient had the tourniquet deflated and returned to the recovery room awake in stable condition. All instrument, sponge, and needle counts were correct. MD Isael Moreno / Jose A Wright  D: 12/27/2018 15:22     T: 12/27/2018 17:34  JOB #: [id number]

## 2018-12-27 NOTE — ANESTHESIA PREPROCEDURE EVALUATION
Anesthetic History   No history of anesthetic complications            Review of Systems / Medical History  Patient summary reviewed, nursing notes reviewed and pertinent labs reviewed    Pulmonary          Smoker         Neuro/Psych   Within defined limits           Cardiovascular  Within defined limits                Exercise tolerance: >4 METS     GI/Hepatic/Renal  Within defined limits              Endo/Other        Arthritis     Other Findings              Physical Exam    Airway  Mallampati: III  TM Distance: 4 - 6 cm  Neck ROM: decreased range of motion   Mouth opening: Diminished (comment)     Cardiovascular  Regular rate and rhythm,  S1 and S2 normal,  no murmur, click, rub, or gallop  Rhythm: regular  Rate: normal         Dental         Pulmonary  Breath sounds clear to auscultation               Abdominal  GI exam deferred       Other Findings            Anesthetic Plan    ASA: 2  Anesthesia type: general and MAC      Post-op pain plan if not by surgeon: peripheral nerve block single    Induction: Intravenous  Anesthetic plan and risks discussed with: Patient and Family

## 2018-12-27 NOTE — INTERVAL H&P NOTE
H&P Update:  Cara Parents was seen and examined. History and physical has been reviewed. The patient has been examined. There have been no significant clinical changes since the completion of the originally dated History and Physical.  Patient identified by surgeon; surgical site was confirmed by patient and surgeon.     Signed By: Gina Cordova MD     December 27, 2018 11:31 AM

## 2018-12-27 NOTE — PERIOP NOTES
Maria Del Rosario-sister picked up belongings with security. Discharge instructions reviewed with patient and caregiver. No questions at this time. Instructed to  prescriptions at own pharmacy sent  Over by Corinne Celeste. Stated understanding. patient belongings with patient. Dressed with caregiver's help. Taken to private vehicle via wheelchair. Awake and alert.

## 2019-05-16 ENCOUNTER — HOSPITAL ENCOUNTER (OUTPATIENT)
Dept: PHYSICAL THERAPY | Age: 64
Discharge: HOME | End: 2019-05-16
Payer: OTHER MISCELLANEOUS

## 2019-05-16 PROCEDURE — 97110 THERAPEUTIC EXERCISES: CPT | Performed by: PHYSICAL THERAPIST

## 2019-05-16 PROCEDURE — 97162 PT EVAL MOD COMPLEX 30 MIN: CPT | Performed by: PHYSICAL THERAPIST

## 2019-05-16 NOTE — PROGRESS NOTES
[facility]  [address], [address]  Ph [phone]  Fx [phone]    Plan of Care/ Statement of Necessity for Physical Therapy Services    Patient name: Essie Tan Start of Care: 2019   Referral source: Sana Garber MD : 1955    Medical Diagnosis: Left hip pain [M25.552]  Right wrist pain [M25.531]  Right knee pain [M25.561]   Onset Date:5 May 2019fall     Treatment Diagnosis: left hip/back pain , right knee pain secondary wrist pain    Prior Hospitalization: see medical history Provider#: [id number]   Medications: Verified on Patient summary List    Comorbidities: tobacco use 1 pp week, TKR in 2018 , surgery bilateral wrist and plantar fascitis surgery    Prior Level of Function: occasional knee pain limiting walking , pt is  very physically demanding job. The Plan of Care and following information is based on the information from the initial evaluation. Assessment/ key information:  Patient is a 60 y. o.female who presents to PT with Left hip pain [M25.552]Right wrist pain [M25.531]Right knee pain [M25.561]. Pt slipped on IV fluid on floor and fell while in patient room removing trash on 6 May 2019. Pt main concern is her left hip/buttock pain with radiation at times to mid calf and right knee pain ( pt with hx of TKR 2018). Right wrist pain not addressed at this session. Since fall pt has pain in walking , bending , lifting , transfers sit to stand , at times difficult to put full weight onto right knee. Pain in left hip is more post buttock pain with back pain > in extension than flexion. No nerve tension or deviation. Pt denies numbness tingling. Hip pain with end range ER ,pain with compression and grind left hip but no catches. Right knee decrease ROM flexion, very ttp over lateral patella anterior lateral joint line and patellar tendon but no ligament laxity noted. Tight quad .   Mild knee and hip weakness , and decrease core control and glut strength with bridges. Noted + trendelenburg gait no assistive device used. The patient will benefit from skilled PT services to address these deficits and facilitate return to full activity level and improved quality of life. Evaluation Complexity History HIGH Complexity :3+ comorbidities / personal factors will impact the outcome/ POC ; Examination HIGH Complexity : 4+ Standardized tests and measures addressing body structure, function, activity limitation and / or participation in recreation  ;Presentation MEDIUM Complexity : Evolving with changing characteristics  ; Clinical Decision Making HIGH Complexity : FOTO score of 1- 25   Overall Complexity Rating: MEDIUM  Problem List: pain affecting function, decrease ROM, decrease strength, edema affecting function, impaired gait/ balance, decrease ADL/ functional abilitiies, decrease activity tolerance, decrease flexibility/ joint mobility and decrease transfer abilities   Treatment Plan may include any combination of the following: Therapeutic exercise, Therapeutic activities, Neuromuscular re-education, Physical agent/modality, Gait/balance training, Manual therapy, Aquatic therapy, Patient education, Self Care training, Functional mobility training, Home safety training and Stair training  Patient / Family readiness to learn indicated by: asking questions, trying to perform skills and interest  Persons(s) to be included in education: patient (P)  Barriers to Learning/Limitations: None  Patient Goal (s): \" pain free , able to walk without pain limping , without waking up in middle of night , better quality of life \"   Patient Self Reported Health Status: good  Rehabilitation Potential: good    Short Term Goals: STG- To be accomplished in 3 week(s):  1. Pt will be compliant with HEP for max progression toward all goals and return to PLOF.   Eval:started on heel slides , SLR , single knee to chest and LTR , SLS balance ex given handout    2.Pt pain will improve >= 40% to allow pt improved sleep and tolerance to daily activity. Eval:8/10 max      3. Pt FOTO scores will increase by >=6 points to show improvement in functional mobility. Eval:39 knee right  , 38 hip left  *will reasses every 5th visit      Long Term Goals: LTG- To be accomplished in 6 week(s):     1. Pt will be independant with HEP for continued and ongoing progression following discharge toward full functional mobility. Eval:started on HEP      2. Pt pain will improve >= 80% to allow pt return to PLOF. Eval:8 max      3. Pt FOTO scores will increase by >=12 points to show improvement in functional mobility. Eval:39 knee right  , 38 hip left   will reassess every 5th visit      4. Pt strength will improve to 5/5 and  SLR to 20 reps to allow pt to return to prior level of function and full work duties in housekeeping   Eval:                 Strength          Knee Left Right   Ext 4+ 4+   Flex 5 5                                Strength   Hip Left Right   Flex 4+ 4   ADD 5 5   ABD 5 + pain in left hip  5   ER 4+ 4+ pain in right knee       Tolerated 6 reps only SLR on right ( pain at knee ) ,   3x only with left pain in hip difficulty keeping knee straight     5. Pt will be able to bridge full height 10 reps with good core control noting improved core and glut strength   Eval:5 reps , decrease height just off table and + cramping in right HS     Frequency / Duration: Patient to be seen 2 times per week for 6 weeks. Patient/ Caregiver education and instruction: Diagnosis, prognosis, self care, activity modification and exercises   [x]  Plan of care has been reviewed with PTA      Certification Period: 5/16/2019-7/15/2019  Kellee Brambila, PT 5/16/2019 1:00 PM    ________________________________________________________________________    I certify that the above Therapy Services are being furnished while the patient is under my care.  I agree with the treatment plan and certify that this therapy is necessary.     Physician's Signature:____________Date:_________TIME:________    [employer], Date and Time must be completed for valid certification **  Please sign and return to [facility]  [address], [address] [facility]  Ph [phone]  Fx [phone]

## 2019-05-16 NOTE — PROGRESS NOTES
PT DAILY TREATMENT NOTE/NEURO EVAL 10-18    Patient Name: Juliann De La Cruz  Date:2019  : 1955  [x]  Patient  Verified  Payor: Blake Master / Plan: [employer] / Product Type: PPO /    In time:1110   Out time:1215  Total Treatment Time (min): 65  Visit #: 1 of 12    Medicare/BCBS Only   Total Timed Codes (min):  15 1:1 Treatment Time:  65     Treatment Area: Left hip pain [M25.552]  Right wrist pain [M25.531]  Right knee pain [M25.561]    SUBJECTIVE  Pt to dept 8 min late with no paper work done prior to appt      Pain Level (0-10 scale):left hip post top of buttock,  constant 5-6/10 walking irritates bend stoop , stand , radiated down back leg  to mid calf   Right knee : 3/10 hx of prior knee replacement ( 2018 ) , now since fall increase soreness , stiffness with bend , pain with walk , difficult to put full weight , clicks with bend , difficult sit to stand and walking less pain with being still   Right wrist pain : not as bad as day progresses , surgery in Dec 2018 , sore with pressure in palm not really worse since fall , at night still feels some carpal tunnel sx if lie on it     [x]constant []intermittent [x]improving []worsening []no change since onset    Any medication changes, allergies to medications, adverse drug reactions, diagnosis change, or new procedure performed?: [x] No    [] Yes (see summary sheet for update)  Subjective functional status/changes:     PLOF: hip didn't bother , some difficulty due to ovary issue ( mass ) , some limitation with right knee good /bad days sometimes limited walking   Limitations to PLOF: walking bending , lift   Mechanism of Injury: pt fell (Monday 6 may )   , pt pulling trash in PACU standing on right side of bed , when walked away from bed slipped and fell from IV fluid on floor , left leg went out from under fell on right knee     Current symptoms/Complaints: see above for each location   Previous Treatment/Compliance: therapy in past for TKR in 2018 , no therapy for right wrist   PMHx/Surgical Hx: right wrist , left wrist ( 2016 ) , right TKR in 2018 , tubal ligation 2001 , plantar fascitis surgery 2001   Work Hx:  lifting heavey bags of trash ,  bending , pulling cleaning   Living Situation: lives with daughter who is able to help mentally challenged but functional , no stairs ( difficult with steps and curb )   Pt Goals: \" pain free , able to walk without pain limping , without waking up in middle of night , better quality of life \"   Barriers: []pain []financial []time []transportation []other  Motivation: kind of low due to being in pain for the last year , apprehensive that therapy will hurt   Substance use: []Alcohol []Tobacco []other:   FABQ Score: []low []elevate  Cognition: A & O x 4   Other:notes does feel some forgetful     OBJECTIVE/EXAMINATION  Domestic Life: feels safe at home   Activity/Recreational Limitations: stairs , lifting bending walking ,bike hurting hip   Mobility: not using assitive device   Self Care: indep         50 min [x]Eval                  []Re-Eval       15 min Therapeutic Exercise:  [x] See flow sheet :   Rationale: increase ROM, increase strength and improve balance to improve the patients ability to return to full work duties and prior level of function           With   [] TE   [] TA   [] neuro   [] other: Patient Education: [x] Review HEP    [] Progressed/Changed HEP based on:   [] positioning   [] body mechanics   [] transfers   [] heat/ice application    [] other:      Other Objective/Functional Measures: FOTO:    Physical Therapy Evaluation - Neurologic    Posture: [] Poor    [x] Fair    [] Good    Describe: increase lordosis flexed hips       = crest , knee crease , increase lordosis , righ tarch foot lower than left , not V/V , = arm space     Low back : standing flexion fingers to distal shin , 1x pull in HS but no worse , 10x : pull gets worse during , right knee aggrev but overall no worse     Standing extension :1 x 22 deg pull in left buttock 10x ( performed only 5) some soreness in low back     pressups 3/4 distance : end range pain with 2-3 reps , unilateral pressups some stiffness bilaterally     Gait: [] Normal    [x] Abnormal    Device:    None   Describe: trendelenburg              AROM    Strength    Knee Left Right Left Right   Ext 10 hyper  5 hyper  4+ 4+   Flex 122/130 110/113 5 5             AROM                          Strength   Hip Left Right Left Right   Flex 125 124 4+ 4   ADD   5 5   ABD   5 + pain in left hip  5   ER 70+ pain end range  45 4+ 4+ pain in right knee    IR 35 37       Tolerated 6 reps only SLR on right ( pain at knee ) ,   3x only with left pain in hip difficulty keeping knee straight     Bridges : cramp in right HS , performed 5reps , hips off table mild                                          AROM             Strength    Ankle Left Right Left Right   Ext       Flex   5 5       Sensation: pt denies numbness tingling     ttp : right knee , lateral patella , lateral anterior joit line , patellar tendon very tender , left : no ttp     Hip compression/grind  : + pain posterior , smooth with grind some pain medial left hip   Right : no pain in hip just knee       Knee :right knee :  negative for ligament instability , neg anterior/ post drawer , and V/V     Quads very tight 110 deg flexion in prone on left , 100 deg on right       Reflexes: [x] Not Tested   Left Right   Biceps (C5)     Brachioradiais (C6)     Triceps (C7)     Knee Jerk (L4)     Ankle Jerk (SI)       Balance/ Equilibrium: SLS : after several tries up to 10 sec bilaterally           Other test /comments:       Pain Level (0-10 scale) post treatment: 4 in left buttock hip , 0 in right knee following treatment in sitting     ASSESSMENT/Changes in Function: Patient is a 60 y. o.female who presents to PT with Left hip pain [M25.552]  Right wrist pain [M25.531]  Right knee pain [M25.561].   Pt slipped on IV fluid on floor and fell while in patient room removing trash on 6 May 2019. Pt main concern is her left hip/buttock pain with radiation at times to mid calf and right knee pain ( pt with hx of TKR Jan 2018). Right wrist pain not addressed at this session. Since fall pt has pain in walking , bending , lifting , transfers sit to stand , at times difficult to put full weight onto right knee. Pain in left hip is more post buttock pain with back pain > in extension than flexion. No nerve tension or deviation. Pt denies numbness tingling. Hip pain with end range ER , compression and grind but no catches. Right knee decrease ROM flexion, very ttp over lateral patella anterior lateral joint line and patellar tendon but no ligament laxity noted. Mild knee and hip weakness , and decrease core control and glut strength with bridges. Noted + trendelenburg gait no assistive device used. The patient will benefit from skilled PT services to address these deficits and facilitate return to full activity level and improved quality of life. Patient will continue to benefit from skilled PT services to modify and progress therapeutic interventions, address functional mobility deficits, address ROM deficits, address strength deficits, analyze and address soft tissue restrictions, analyze and cue movement patterns, analyze and modify body mechanics/ergonomics, assess and modify postural abnormalities and address imbalance/dizziness to attain remaining goals. [x]  See Plan of Care  []  See progress note/recertification  []  See Discharge Summary         Progress towards goals / Updated goals:  Short Term Goals: STG- To be accomplished in 3 week(s):  1. Pt will be compliant with HEP for max progression toward all goals and return to PLOF. Eval:started on heel slides , SLR , single knee to chest and LTR , SLS balance ex given handout     2. Pt pain will improve >= 40% to allow pt improved sleep and tolerance to daily activity. Eval:8/10 max     3. Pt FOTO scores will increase by >=6 points to show improvement in functional mobility. Eval: 39 knee right  , 38 hip left   *will reasses every 5th visit     Long Term Goals: LTG- To be accomplished in 6 week(s):    1. Pt will be independant with HEP for continued and ongoing progression following discharge toward full functional mobility. Eval:started on HEP     2.Pt pain will improve >= 80% to allow pt return to PLOF. Eval:8 max     3. Pt FOTO scores will increase by >=12  points to show improvement in functional mobility. Eval:39 knee right  , 38 hip left   will reassess every 5th visit     4. 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   Pt will be able to bridge full height 10 reps with good core control noting improved core and glut strength   Eval:5 reps , decrease height just off table and + cramping in right HS     PLAN  [x]  Upgrade activities as tolerated     [x]  Continue plan of care  []  Update interventions per flow sheet       []  Discharge due to:_  []  Other:_      Felicia Stevenson, PT 5/16/2019  11:08 AM

## 2019-05-22 ENCOUNTER — HOSPITAL ENCOUNTER (OUTPATIENT)
Dept: PHYSICAL THERAPY | Age: 64
Discharge: HOME | End: 2019-05-22
Payer: OTHER MISCELLANEOUS

## 2019-05-22 PROCEDURE — 97110 THERAPEUTIC EXERCISES: CPT

## 2019-05-22 NOTE — PROGRESS NOTES
PT DAILY TREATMENT NOTE    Patient Name: Armani Calvillo  Date:2019  : 1955  [x]  Patient  Verified  Payor: Trever Dexterbeccakristie Eduardolashanda [id number] / Plan: [id number] / Product Type: Workers Comp /    In time:2:35  Out time:3:30  Total Treatment Time (min): 55  Total Timed Codes (min): 55  1:1 Treatment Time ( only): NA   Visit #: 2 of 12    Treatment Area: Left hip pain [M25.552]  Right wrist pain [M25.531]  Right knee pain [M25.561]    SUBJECTIVE  Pain Level (0-10 scale): 6/10  Any medication changes, allergies to medications, adverse drug reactions, diagnosis change, or new procedure performed?: [x] No    [] Yes (see summary sheet for update)  Subjective functional status/changes:   [] No changes reported  \"I have pain in my left hip the most today. \"    OBJECTIVE    Modalities Rationale:     decrease edema, decrease inflammation and decrease pain to improve patient's ability to return to prior level of physical activity. 10 min [x]  Ice     []  Heat    location/position: Supine/left hip   [x] Skin assessment post-treatment (if applicable):    [x]  intact    []  redness- no adverse reaction     []redness - adverse reaction:          45 min Therapeutic Exercise:  [x] See flow sheet :   Rationale: increase ROM, increase strength and improve coordination to improve the patients ability to return to prior level of physical activity. With   [] TE   [] TA   [] neuro   [] other: Patient Education: [x] Review HEP    [] Progressed/Changed HEP based on:   [] positioning   [] body mechanics   [] transfers   [] heat/ice application    [] other:      Other Objective/Functional Measures:      Pain Level (0-10 scale) post treatment: 4/10    ASSESSMENT/Changes in Function: Pt tolerated session well. Pt did have increased pain in hip from ex but reported decreased pain post tx modalities. Attempted 90/90 ext Isometrics and patient reported increased.  Will attempt next visit to see same response or decreased pain. Pt demonstrated no adverse affects post tx. Patient will continue to benefit from skilled PT services to modify and progress therapeutic interventions, address functional mobility deficits, address ROM deficits, address strength deficits, analyze and address soft tissue restrictions, analyze and cue movement patterns, analyze and modify body mechanics/ergonomics and assess and modify postural abnormalities to attain remaining goals. []  See Plan of Care  []  See progress note/recertification  []  See Discharge Summary         Progress towards goals / Updated goals:  Short Term Goals: STG- To be accomplished in 3 week(s):  1.  Pt will be compliant with HEP for max progression toward all goals and return to PLOF. Eval:started on heel slides , SLR , single knee to chest and LTR , SLS balance ex given handout      2. Pt pain will improve >= 40% to allow pt improved sleep and tolerance to daily activity. Eval:8/10 max      3. Pt FOTO scores will increase by >=6 points to show improvement in functional mobility. Eval:39 knee right  , 38 hip left  *will reasses every 5th visit      Long Term Goals: LTG- To be accomplished in 6 week(s):     1.  Pt will be independant with HEP for continued and ongoing progression following discharge toward full functional mobility. Eval:started on HEP      2. Pt pain will improve >= 80% to allow pt return to PLOF. Eval:8 max      3. Pt FOTO scores will increase by >=12 points to show improvement in functional mobility. Eval:39 knee right  , 38 hip left   will reassess every 5th visit      4.  Pt strength will improve to 5/5 and  SLR to 20 reps to allow pt to return to prior level of function and full work duties in [employer]  Eval:                 Strength          Knee Left Right   Ext 4+ 4+   Flex 5 5                                Strength   Hip Left Right   Flex 4+ 4   ADD 5 5   ABD 5 + pain in left hip  5   ER 4+ 4+ pain in right knee     Tolerated 6 reps only SLR on right ( pain at knee ) ,   3x only with left pain in hip difficulty keeping knee straight     5.  Pt will be able to bridge full height 10 reps with good core control noting improved core and glut strength   Eval:5 reps , decrease height just off table and + cramping in right HS              PLAN  [x]  Upgrade activities as tolerated     [x]  Continue plan of care  []  Update interventions per flow sheet       []  Discharge due to:_  []  Other:_      Bernice Mcmillan PTA 5/22/2019  2:46 PM    Future Appointments   Date Time Provider Angel Lopez   5/24/2019 10:30 AM [facility]   5/29/2019 12:30 PM [facility]   5/31/2019 10:00 AM Erin Díaz [facility]   6/5/2019 10:30 AM Erin Díaz [facility]   6/12/2019  6:00 PM [facility]   6/13/2019  5:30 PM Tabitha Hernandez PT [facility]   6/21/2019 10:30 AM Erin Díaz [facility]

## 2019-05-24 ENCOUNTER — HOSPITAL ENCOUNTER (OUTPATIENT)
Dept: PHYSICAL THERAPY | Age: 64
Discharge: HOME | End: 2019-05-24
Payer: OTHER MISCELLANEOUS

## 2019-05-24 PROCEDURE — 97110 THERAPEUTIC EXERCISES: CPT

## 2019-05-24 NOTE — PROGRESS NOTES
PT DAILY TREATMENT NOTE    Patient Name: Elen Ballard  Date:2019  : 1955  [x]  Patient  Verified  Payor: Trever Millan Humberto Rasmussen [id number] / Plan: [id number] / Product Type: Workers Comp /    In time:10:30  Out time:11:25  Total Treatment Time (min): 55  Total Timed Codes (min): 55  1:1 Treatment Time ( only): 39   Visit #: 3 of 12    Treatment Area: Left hip pain [M25.552]  Right wrist pain [M25.531]  Right knee pain [M25.561]    SUBJECTIVE  Pain Level (0-10 scale): 9/10  Any medication changes, allergies to medications, adverse drug reactions, diagnosis change, or new procedure performed?: [x] No    [] Yes (see summary sheet for update)  Subjective functional status/changes:   [] No changes reported  \"It hurts in my hips and my knees. I did a lot of standing yesterday. \"    OBJECTIVE    Modalities Rationale:     decrease edema, decrease inflammation and decrease pain to improve patient's ability to return to prior level of physical activity. 10 min [x]  Ice     []  Heat    location/position: B knees and Left Hip    [x] Skin assessment post-treatment (if applicable):    [x]  intact    []  redness- no adverse reaction     []redness - adverse reaction:           45 min Therapeutic Exercise:  [x] See flow sheet :   Rationale: increase ROM, increase strength and improve coordination to improve the patients ability to return to prior level of physical activity. With   [] TE   [] TA   [] neuro   [] other: Patient Education: [x] Review HEP    [] Progressed/Changed HEP based on:   [] positioning   [] body mechanics   [] transfers   [] heat/ice application    [] other:      Other Objective/Functional Measures:      Pain Level (0-10 scale) post treatment: 6/10    ASSESSMENT/Changes in Function: Pt arrived reported high pain levels. Pt required continuous VC to discourage antalgic guarding, causing increased pain. Pt gradually tolerated ex with lessened pain as tx progressed.  Pt received VASO post tx. Patient will continue to benefit from skilled PT services to modify and progress therapeutic interventions, address functional mobility deficits, address ROM deficits, address strength deficits, analyze and address soft tissue restrictions, analyze and cue movement patterns, analyze and modify body mechanics/ergonomics and assess and modify postural abnormalities to attain remaining goals. []  See Plan of Care  []  See progress note/recertification  []  See Discharge Summary         Progress towards goals / Updated goals:  Short Term Goals: STG- To be accomplished in 3 week(s):  1.  Pt will be compliant with HEP for max progression toward all goals and return to PLOF. Eval:started on heel slides , SLR , single knee to chest and LTR , SLS balance ex given handout   Current: Pt reports occasional compliance but not regular 5/24/19     2. Pt pain will improve >= 40% to allow pt improved sleep and tolerance to daily activity. Eval:8/10 max      3. Pt FOTO scores will increase by >=6 points to show improvement in functional mobility. Eval:39 knee right  , 38 hip left  *will reasses every 5th visit      Long Term Goals: LTG- To be accomplished in 6 week(s):     1.  Pt will be independant with HEP for continued and ongoing progression following discharge toward full functional mobility. Eval:started on HEP      2. Pt pain will improve >= 80% to allow pt return to PLOF. Eval:8 max      3. Pt FOTO scores will increase by >=12 points to show improvement in functional mobility. Eval:39 knee right  , 38 hip left   will reassess every 5th visit      4.  Pt strength will improve to 5/5 and  SLR to 20 reps to allow pt to return to prior level of function and full work duties in [employer]  Eval:                 Strength          Knee Left Right   Ext 4+ 4+   Flex 5 5                                Strength   Hip Left Right   Flex 4+ 4   ADD 5 5   ABD 5 + pain in left hip  5   ER 4+ 4+ pain in right knee       Tolerated 6 reps only SLR on right ( pain at knee ) ,   3x only with left pain in hip difficulty keeping knee straight     5.  Pt will be able to bridge full height 10 reps with good core control noting improved core and glut strength   Eval:5 reps , decrease height just off table and + cramping in right HS                 PLAN  [x]  Upgrade activities as tolerated     [x]  Continue plan of care  []  Update interventions per flow sheet       []  Discharge due to:_  []  Other:_      Lacey Dinh PTA 5/24/2019  10:36 AM    Future Appointments   Date Time Provider Angel Lopez   5/29/2019 12:30 PM Frieda Fothergill [facility]   5/31/2019 10:00 AM Frieda Fothergill [facility]   6/5/2019 10:30 AM Manoj Zarate PTA [facility]   6/12/2019  6:00 PM Frieda Fothergill [facility]   6/13/2019  5:30 PM Baron Jona PT [facility]   6/21/2019 10:30 AM Manoj Zarate PTA [facility]

## 2019-05-29 ENCOUNTER — HOSPITAL ENCOUNTER (OUTPATIENT)
Dept: PHYSICAL THERAPY | Age: 64
Discharge: HOME | End: 2019-05-29
Payer: OTHER MISCELLANEOUS

## 2019-05-29 PROCEDURE — 97110 THERAPEUTIC EXERCISES: CPT

## 2019-05-29 NOTE — PROGRESS NOTES
PT DAILY TREATMENT NOTE    Patient Name: Oli Albert  Date:2019  : 1955  [x]  Patient  Verified  Payor: Trever Yana Humberto Rasmussen [id number] / Plan: [id number] / Product Type: Workers Comp /    In [employer] time:1:30  Total Treatment Time (min): 55  Total Timed Codes (min): 55  1:1 Treatment Time (MC only): NA   Visit #: 4 of 12    Treatment Area: Left hip pain [M25.552]  Right wrist pain [M25.531]  Right knee pain [M25.561]    SUBJECTIVE  Pain Level (0-10 scale): 6.510  Any medication changes, allergies to medications, adverse drug reactions, diagnosis change, or new procedure performed?: [x] No    [] Yes (see summary sheet for update)  Subjective functional status/changes:   [] No changes reported  \"I have pain today. I did some of my exercises over the weekend. \"    OBJECTIVE    Modalities Rationale:     decrease edema, decrease inflammation and decrease pain to improve patient's ability to return to prior level of physical activity. 10 min []  Ice     [x]  Heat    location/position: Left Hip/B Knees   [x] Skin assessment post-treatment (if applicable):    [x]  intact    []  redness- no adverse reaction     []redness - adverse reaction:          45 min Therapeutic Exercise:  [x] See flow sheet :   Rationale: increase ROM, increase strength and improve coordination to improve the patients ability to return to prior level of physical activity. With   [] TE   [] TA   [] neuro   [] other: Patient Education: [x] Review HEP    [] Progressed/Changed HEP based on:   [] positioning   [] body mechanics   [] transfers   [] heat/ice application    [] other:      Other Objective/Functional Measures:      Pain Level (0-10 scale) post treatment: 6/10    ASSESSMENT/Changes in Function: Pt tolerated session moderately well. Pt performed all therex fully but did report increased pain and discomfort with ex. Pt reported no adverse affects post tx.      Patient will continue to benefit from skilled PT services to modify and progress therapeutic interventions, address functional mobility deficits, address ROM deficits, address strength deficits, analyze and address soft tissue restrictions, analyze and cue movement patterns, analyze and modify body mechanics/ergonomics and assess and modify postural abnormalities to attain remaining goals. []  See Plan of Care  []  See progress note/recertification  []  See Discharge Summary         Progress towards goals / Updated goals:  Short Term Goals: STG- To be accomplished in 3 week(s):  1.  Pt will be compliant with HEP for max progression toward all goals and return to PLOF. Eval:started on heel slides , SLR , single knee to chest and LTR , SLS balance ex given handout   Current: Pt reports occasional compliance but not regular 5/24/19     2. Pt pain will improve >= 40% to allow pt improved sleep and tolerance to daily activity. Eval:8/10 max      3. Pt FOTO scores will increase by >=6 points to show improvement in functional mobility. Eval:39 knee right  , 38 hip left  *will reasses every 5th visit      Long Term Goals: LTG- To be accomplished in 6 week(s):     1.  Pt will be independant with HEP for continued and ongoing progression following discharge toward full functional mobility. Eval:started on HEP      2. Pt pain will improve >= 80% to allow pt return to PLOF. Eval:8 max      3. Pt FOTO scores will increase by >=12 points to show improvement in functional mobility. Eval:39 knee right  , 38 hip left   will reassess every 5th visit      4.  Pt strength will improve to 5/5 and  SLR to 20 reps to allow pt to return to prior level of function and full work duties in [employer]  Eval:                 Strength          Knee Left Right   Ext 4+ 4+   Flex 5 5                                Strength   Hip Left Right   Flex 4+ 4   ADD 5 5   ABD 5 + pain in left hip  5   ER 4+ 4+ pain in right knee       Tolerated 6 reps only SLR on right ( pain at knee ) ,   3x only with left pain in hip difficulty keeping knee straight     5.  Pt will be able to bridge full height 10 reps with good core control noting improved core and glut strength   Eval:5 reps , decrease height just off table and + cramping in right HS  Current: Pt able to perform 10 bridges but not to full height 5/29/19                    PLAN  [x]  Upgrade activities as tolerated     [x]  Continue plan of care  []  Update interventions per flow sheet       []  Discharge due to:_  []  Other:_      Denise Hoffmann PTA 5/29/2019  12:49 PM    Future Appointments   Date Time Provider Angel Lopez   5/31/2019 10:00 AM [facility]   6/5/2019 10:30 AM Norma Nicholson PTA [facility]   6/12/2019  6:00 PM [facility]   6/13/2019  5:30 PM Willy Barrientos PT [facility]   6/21/2019 10:30 AM Norma Nicholson PTA [facility]

## 2019-05-31 ENCOUNTER — HOSPITAL ENCOUNTER (OUTPATIENT)
Dept: PHYSICAL THERAPY | Age: 64
Discharge: HOME | End: 2019-05-31
Payer: OTHER MISCELLANEOUS

## 2019-05-31 PROCEDURE — 97016 VASOPNEUMATIC DEVICE THERAPY: CPT

## 2019-05-31 PROCEDURE — 97110 THERAPEUTIC EXERCISES: CPT

## 2019-05-31 NOTE — PROGRESS NOTES
PT DAILY TREATMENT NOTE    Patient Name: Vivian Saini  Date:2019  : 1955  [x]  Patient  Verified  Payor: Trever Dexterbeccakristie Eduardolashanda [id number] / Plan: [id number] / Product Type: Workers Comp /    In [employer] time:11:05  Total Treatment Time (min): 60  Total Timed Codes (min): 60  1:1 Treatment Time (MC only): NA   Visit #: 5 of 12    Treatment Area: Left hip pain [M25.552]  Right wrist pain [M25.531]  Right knee pain [M25.561]    SUBJECTIVE  Pain Level (0-10 scale): 8/10  Any medication changes, allergies to medications, adverse drug reactions, diagnosis change, or new procedure performed?: [x] No    [] Yes (see summary sheet for update)  Subjective functional status/changes:   [] No changes reported  \"I am so achy today. It hurts today. \"    OBJECTIVE    Modalities Rationale:     decrease edema, decrease inflammation and decrease pain to improve patient's ability to return to prior level of physical activity. 10 min [x]  Ice     [x]  Heat    location/position: Supine/Heat - Left Hip, Ice B Knees   [x] Skin assessment post-treatment (if applicable):    [x]  intact    []  redness- no adverse reaction     []redness - adverse reaction:          50 min Therapeutic Exercise:  [x] See flow sheet :   Rationale: increase ROM, increase strength and improve coordination to improve the patients ability to return to prior level of physical activity. With   [] TE   [] TA   [] neuro   [] other: Patient Education: [x] Review HEP    [] Progressed/Changed HEP based on:   [] positioning   [] body mechanics   [] transfers   [] heat/ice application    [] other:      Other Objective/Functional Measures:      Pain Level (0-10 scale) post treatment: 7/10    ASSESSMENT/Changes in Function: Pt tolerated session moderately well. Pt continues to be challenged by there, reporting increased difficulty with ex.  Pt also continue to show antalgic bracing and guarding, requiring constant VC to discourage. Pt received Modalities post tx. Patient will continue to benefit from skilled PT services to modify and progress therapeutic interventions, address functional mobility deficits, address ROM deficits, address strength deficits, analyze and address soft tissue restrictions, analyze and cue movement patterns, analyze and modify body mechanics/ergonomics and assess and modify postural abnormalities to attain remaining goals. []  See Plan of Care  []  See progress note/recertification  []  See Discharge Summary         Progress towards goals / Updated goals:  Short Term Goals: STG- To be accomplished in 3 week(s):  1.  Pt will be compliant with HEP for max progression toward all goals and return to PLOF. Eval:started on heel slides , SLR , single knee to chest and LTR , SLS balance ex given handout   Current: Pt reports occasional compliance but not regular 5/24/19     2. Pt pain will improve >= 40% to allow pt improved sleep and tolerance to daily activity. Eval:8/10 max      3. Pt FOTO scores will increase by >=6 points to show improvement in functional mobility. Eval:39 knee right  , 38 hip left  *will reasses every 5th visit      Long Term Goals: LTG- To be accomplished in 6 week(s):     1.  Pt will be independant with HEP for continued and ongoing progression following discharge toward full functional mobility. Eval:started on HEP      2. Pt pain will improve >= 80% to allow pt return to PLOF. Eval:8 max      3. Pt FOTO scores will increase by >=12 points to show improvement in functional mobility. Eval:39 knee right  , 38 hip left   will reassess every 5th visit      4.  Pt strength will improve to 5/5 and  SLR to 20 reps to allow pt to return to prior level of function and full work duties in [employer] [facility]  Eval:                 Strength          Knee Left Right   Ext 4+ 4+   Flex 5 5                                Strength   Hip Left Right   Flex 4+ 4   ADD 5 5   ABD 5 + pain in left hip  5   ER 4+ 4+ pain in right knee       Tolerated 6 reps only SLR on right ( pain at knee ) ,   3x only with left pain in hip difficulty keeping knee straight     5.  Pt will be able to bridge full height 10 reps with good core control noting improved core and glut strength   Eval:5 reps , decrease height just off table and + cramping in right HS  Current: Pt able to perform 10 bridges but not to full height 5/29/19                 PLAN  [x]  Upgrade activities as tolerated     [x]  Continue plan of care  []  Update interventions per flow sheet       []  Discharge due to:_  []  Other:_      Laura Bryant PTA 5/31/2019  10:16 AM    Future Appointments   Date Time Provider Angel Lopez   6/5/2019 10:30 AM [facility]   6/12/2019  6:00 PM [facility]   6/13/2019  5:30 PM Ana Maria Varela, PT [facility]   6/21/2019 10:30 AM Phill Everett PTA [facility]

## 2019-06-05 ENCOUNTER — HOSPITAL ENCOUNTER (OUTPATIENT)
Dept: PHYSICAL THERAPY | Age: 64
Discharge: HOME | End: 2019-06-05
Payer: OTHER MISCELLANEOUS

## 2019-06-05 PROCEDURE — 97110 THERAPEUTIC EXERCISES: CPT

## 2019-06-05 NOTE — PROGRESS NOTES
PT DAILY TREATMENT NOTE    Patient Name: Bob Valverde  Date:2019  : 1955  [x]  Patient  Verified  Payor: Trever Millan Humberto Rasmussen [id number] / Plan: [id number] / Product Type: Workers Comp /    In time:10:30  Out time:11:45  Total Treatment Time (min): 75  Total Timed Codes (min): 75  1:1 Treatment Time ( only): 65   Visit #: 6 of 12    Treatment Area: Left hip pain [M25.552]  Right wrist pain [M25.531]  Right knee pain [M25.561]    SUBJECTIVE  Pain Level (0-10 scale): 8/10 hip, 5/10 knee  Any medication changes, allergies to medications, adverse drug reactions, diagnosis change, or new procedure performed?: [x] No    [] Yes (see summary sheet for update)  Subjective functional status/changes:   [] No changes reported  \"It hurts in my hip today. My knee hurts but not as bad. \"    OBJECTIVE    Modalities Rationale:     decrease edema, decrease inflammation and decrease pain to improve patient's ability to return to prior level of physical activity. 10 min [x]  Ice     []  Heat    location/position: Supine/Left hip, B knees   [x] Skin assessment post-treatment (if applicable):    [x]  intact    []  redness- no adverse reaction     []redness - adverse reaction:          65 min Therapeutic Exercise:  [x] See flow sheet :   Rationale: increase ROM, increase strength and improve coordination to improve the patients ability to return to prior level of physical activity. With   [] TE   [] TA   [] neuro   [] other: Patient Education: [x] Review HEP    [] Progressed/Changed HEP based on:   [] positioning   [] body mechanics   [] transfers   [] heat/ice application    [] other:      Other Objective/Functional Measures:   Access Code: [email or web address]   URL: [email or web address]   Date: 2019   Prepared by: Chani Kinney     Exercises   Sidelying Thoracic Rotation with Open Book - 10 reps - 3 sets - 5 hold - 1x daily - 3x weekly   Supine Lower Trunk Rotation - 10 reps - 3 sets - 5 hold - 1x daily - 3x weekly   Supine Heel Slide - 10 reps - 3 sets - 5 hold - 1x daily - 3x weekly   Supine Bridge - 10 reps - 3 sets - 1x daily - 3x weekly   Clamshell with Resistance - 10 reps - 3 sets - 1x daily - 3x weekly   Seated Hamstring Stretch - 10 reps - 3 sets - 1x daily - 3x weekly   Standing Hip Flexion with Resistance Loop - 10 reps - 3 sets - 1x daily - 3x weekly   Standing Hip Abduction Kicks - 10 reps - 3 sets - 1x daily - 3x weekly   Hip Extension with Resistance Loop - 10 reps - 3 sets - 1x daily - 3x weekly   Supine Single Leg Lift - 10 reps - 3 sets - 1x daily - 3x weekly       Pain Level (0-10 scale) post treatment: 6/10 hip, 5/10 knee    ASSESSMENT/Changes in Function: Pt tolerated session moderately well. Pt continues to require VC to discourage bracing and valsalva compensation during ex. Pt directed with  Proper gait mechanics to avoid circumduction/trendelenburg compensation. Pt received CP post tx. Patient will continue to benefit from skilled PT services to modify and progress therapeutic interventions, address functional mobility deficits, address ROM deficits, address strength deficits, analyze and address soft tissue restrictions, analyze and cue movement patterns, analyze and modify body mechanics/ergonomics and assess and modify postural abnormalities to attain remaining goals. []  See Plan of Care  []  See progress note/recertification  []  See Discharge Summary         Progress towards goals / Updated goals:  Short Term Goals: STG- To be accomplished in 3 week(s):  1.  Pt will be compliant with HEP for max progression toward all goals and return to PLOF. Eval:started on heel slides , SLR , single knee to chest and LTR , SLS balance ex given handout   Current: Pt reports occasional compliance but not regular 5/24/19     2. Pt pain will improve >= 40% to allow pt improved sleep and tolerance to daily activity. Eval:8/10 max      3.  Pt FOTO scores will increase by >=6 points to show improvement in functional mobility. Eval:39 knee right  , 38 hip left  *will reasses every 5th visit      Long Term Goals: LTG- To be accomplished in 6 week(s):     1.  Pt will be independant with HEP for continued and ongoing progression following discharge toward full functional mobility. Eval:started on HEP      2. Pt pain will improve >= 80% to allow pt return to PLOF. Eval:8 max      3. Pt FOTO scores will increase by >=12 points to show improvement in functional mobility. Eval:39 knee right  , 38 hip left   will reassess every 5th visit      4.  Pt strength will improve to 5/5 and  SLR to 20 reps to allow pt to return to prior level of function and full work duties in [address] [employer]  Eval:                 Strength          Knee Left Right   Ext 4+ 4+   Flex 5 5                                Strength   Hip Left Right   Flex 4+ 4   ADD 5 5   ABD 5 + pain in left hip  5   ER 4+ 4+ pain in right knee       Tolerated 6 reps only SLR on right ( pain at knee ) ,   3x only with left pain in hip difficulty keeping knee straight     5.  Pt will be able to bridge full height 10 reps with good core control noting improved core and glut strength   Eval:5 reps , decrease height just off table and + cramping in right HS  Current: Pt able to perform 10 bridges but not to full height 5/29/19                 PLAN  [x]  Upgrade activities as tolerated     [x]  Continue plan of care  []  Update interventions per flow sheet       []  Discharge due to:_  []  Other:_      Nieves Haynes, LISA 6/5/2019  11:01 AM    Future Appointments   Date Time Provider Angel Lopez   6/12/2019  6:00 PM Scott Obregon [facility]   6/13/2019  5:30 PM Danica Jurado PT [facility]   6/21/2019 10:30 AM Katarina Carvalho PTA [facility]

## 2019-06-12 ENCOUNTER — APPOINTMENT (OUTPATIENT)
Dept: PHYSICAL THERAPY | Age: 64
End: 2019-06-12
Payer: OTHER MISCELLANEOUS

## 2019-06-13 ENCOUNTER — APPOINTMENT (OUTPATIENT)
Dept: PHYSICAL THERAPY | Age: 64
End: 2019-06-13
Payer: OTHER MISCELLANEOUS

## 2019-06-18 ENCOUNTER — HOSPITAL ENCOUNTER (OUTPATIENT)
Dept: PHYSICAL THERAPY | Age: 64
Discharge: HOME | End: 2019-06-18
Payer: OTHER MISCELLANEOUS

## 2019-06-18 PROCEDURE — 97530 THERAPEUTIC ACTIVITIES: CPT

## 2019-06-18 PROCEDURE — 97110 THERAPEUTIC EXERCISES: CPT

## 2019-06-18 NOTE — PROGRESS NOTES
PT DAILY TREATMENT NOTE    Patient Name: Ana Maloney  Date:2019  : 1955  [x]  Patient  Verified  Payor: Trever Dexterbeccakristie Eduardolashanda [id number] / Plan: [address] / Product Type:  Workers Comp /    In [employer]  Total Treatment Time (min): 57  Total Timed Codes (min): 47  1:1 Treatment Time ( only): 52   Visit #: 7 of 12    Treatment Area: Left hip pain [M25.552]  Right wrist pain [M25.531]  Right knee pain [M25.561]    SUBJECTIVE  Pain Level (0-10 scale): 6/10  Any medication changes, allergies to medications, adverse drug reactions, diagnosis change, or new procedure performed?: [x] No    [] Yes (see summary sheet for update)  Subjective functional status/changes:   [] No changes reported  Pt reports limited consistency with HEP    OBJECTIVE    Modalities Rationale:     decrease inflammation and decrease pain to improve patient's ability to stand more than 30 minutes   min [] Estim, type/location:                                      []  att     []  unatt     []  w/US     []  w/ice    []  w/heat    min []  Mechanical Traction: type/lbs                   []  pro   []  sup   []  int   []  cont    []  before manual    []  after manual    min []  Ultrasound, settings/location:      min []  Iontophoresis w/ dexamethasone, location:                                               []  take home patch       []  in clinic   10 min [x]  Ice     []  Heat    location/position: Left knee and hip, supine    min []  Vasopneumatic Device, press/temp:     min []  Other:    [x] Skin assessment post-treatment (if applicable):    [x]  intact    [x]  redness- no adverse reaction     []redness - adverse reaction:          32 min Therapeutic Exercise:  [] See flow sheet :   Rationale: increase ROM, increase strength, improve coordination, improve balance and increase proprioception to improve the patients ability to stand more than 30 minutes    15 min Therapeutic Activity:  []  See flow sheet : pt education, strength check, checking goals   Rationale: increase ROM, increase strength and improve coordination  to improve the patients ability to stand more than 30 minutes           With   [x] TE   [] TA   [] neuro   [] other: Patient Education: [x] Review HEP    [] Progressed/Changed HEP based on:   [] positioning   [] body mechanics   [] transfers   [] heat/ice application    [x] other: consistency with HEP     Other Objective/Functional Measures:      Pain Level (0-10 scale) post treatment: 5/10 hip left, 0/10 left knee    ASSESSMENT/Changes in Function:Pt has had limited progress since start of care partially due to poor compliance with HEP. She has made some progress with hamstring strength and FOTo score has improved for her hip. Pt encouraged to improve consistency to improve her overall function. Reviewed HEP with patient    Patient will continue to benefit from skilled PT services to address functional mobility deficits, address ROM deficits, address strength deficits, analyze and address soft tissue restrictions, analyze and cue movement patterns, analyze and modify body mechanics/ergonomics, assess and modify postural abnormalities, address imbalance/dizziness and instruct in home and community integration to attain remaining goals. []  See Plan of Care  []  See progress note/recertification  []  See Discharge Summary         Progress towards goals / Updated goals:  Short Term Goals: STG- To be accomplished in 3 week(s):  1.  Pt will be compliant with HEP for max progression toward all goals and return to PLOF. Eval:started on heel slides , SLR , single knee to chest and LTR , SLS balance ex given handout   Current: Pt reports intermittent compliance 6-18-19 NO CHANGE     2.Pt pain will improve >= 40% to allow pt improved sleep and tolerance to daily activity. Eval:8/10 max   Current: Pt reports 8-9/10 NO CHANGE 6-18-19     3.  Pt FOTO scores will increase by >=6 points to show improvement in functional mobility. Eval:39 knee right  , 38 hip left  Current: 42 hip, 37 knee SLIGHT IMPROVEMENT 6-18-19     Long Term Goals: LTG- To be accomplished in 6 week(s):     1.  Pt will be independant with HEP for continued and ongoing progression following discharge toward full functional mobility. Eval:started on HEP   Current:  See above  2. Pt pain will improve >= 80% to allow pt return to PLOF. Eval:8 max  Current: See above      3. Pt FOTO scores will increase by >=12 points to show improvement in functional mobility. Eval:39 knee right  , 38 hip left  Current: see above     4.  Pt strength will improve to 5/5 and  SLR to 20 reps to allow pt to return to prior level of function and full work duties in [employer]  Eval:                 Strength          Knee Left Right   Ext 4+ 4+   Flex 5 5                                Strength   Hip Left Right   Flex 4+ 4   ADD 5 5   ABD 5 + pain in left hip  5   ER 4+ 4+ pain in right knee       Tolerated 6 reps only SLR on right ( pain at knee ) ,   3x only with left pain in hip difficulty keeping knee straight  Current: 3 on left with knee straight and 4 on right with knee straight, NO CHANGE WITH HIP FLEXION and 5/5 on knee extension SLIGHT IMPROVEMENT 6-18-19     5.  Pt will be able to bridge full height 10 reps with good core control noting improved core and glut strength   Eval:5 reps , decrease height just off table and + cramping in right HS  Current: Pt able to perform 10 bridges but not to full height 6-18-19 SLIGHT PROGRESS                         PLAN  []  Upgrade activities as tolerated     []  Continue plan of care  []  Update interventions per flow sheet       []  Discharge due to:_  [x]  Other:_  Continue for 4 more weeks to address deficits  Bonita Bruno, LISA 6/18/2019  10:46 AM    Future Appointments   Date Time Provider Angel Lopez   6/21/2019 10:30 AM [facility]

## 2019-06-18 NOTE — PROGRESS NOTES
[facility]  [address], [address] [facility]  Ph [phone]  Fx [phone]    Physical Therapy Progress Note  Patient name: Ina Woodruff Start of Care: 19   Referral source: Jaylene Godfrey MD : 1955   Medical/Treatment Diagnosis: Left hip pain [M25.552]  Right wrist pain [M25.531]  Right knee pain [M25.561] Onset Date:2019 (fall)   Prior Hospitalization: see medical history Provider#: [id number]   Medications: Verified on Patient Summary List    Comorbidities: tobacco use 1 pp week, TKR in 2018 , surgery bilateral wrist and plantar fascitis surgery   Prior Level of Function:occasional knee pain limiting walking , pt is  very physically demanding job. Visits from Start of Care: 7    Missed Visits: 2    Goals/Measure of Progress:    Short Term Goals: STG- To be accomplished in 3 week(s):  1.  Pt will be compliant with HEP for max progression toward all goals and return to PLOF. Eval:started on heel slides , SLR , single knee to chest and LTR , SLS balance ex given handout   Current: Pt reports intermittent compliance - NO CHANGE     2.Pt pain will improve >= 40% to allow pt improved sleep and tolerance to daily activity. Eval:8/10 max   Current: Pt reports 8-9/10 NO CHANGE -     3. Pt FOTO scores will increase by >=6 points to show improvement in functional mobility. Eval:39 knee right  , 38 hip left  Current: 42 hip, 37 knee SLIGHT IMPROVEMENT 18-     Long Term Goals: LTG- To be accomplished in 6 week(s):     1.  Pt will be independant with HEP for continued and ongoing progression following discharge toward full functional mobility. Eval:started on HEP   Current:  See above  2. Pt pain will improve >= 80% to allow pt return to PLOF. Eval:8 max  Current: See above      3. Pt FOTO scores will increase by >=12 points to show improvement in functional mobility. Eval:39 knee right  , 38 hip left  Current: see above     4.  Pt strength will improve to 5/5 and  SLR to 20 reps to allow pt to return to prior level of function and full work duties in [employer]  Eval:                 Strength          Knee Left Right   Ext 4+ 4+   Flex 5 5                                Strength   Hip Left Right   Flex 4+ 4   ADD 5 5   ABD 5 + pain in left hip  5   ER 4+ 4+ pain in right knee       Tolerated 6 reps only SLR on right ( pain at knee ) ,   3x only with left pain in hip difficulty keeping knee straight  Current: 3 on left with knee straight and 4 on right with knee straight, NO CHANGE WITH HIP FLEXION and 5/5 on knee extension SLIGHT IMPROVEMENT 6-18-19     5.  Pt will be able to bridge full height 10 reps with good core control noting improved core and glut strength   Eval:5 reps , decrease height just off table and + cramping in right HS  Current: Pt able to perform 10 bridges but not to full height 6-18-19 SLIGHT PROGRESS       Patient has made minimal progress toward goals that were set on Evaluation. Patient has been given HEP and reports minimal compliance at home. Patient may benefit from continued skilled PT intervention 2x/wk x2wks paired with improved compliance with HEP to attempt to achieve set goals. Key Functional Changes: minimally improved LE strength and core strength, no change in pain. Updated Goals:  To be achieved in 2 weeks:        ASSESSMENT/RECOMMENDATIONS:  [x]Continue therapy per initial plan/protocol at a frequency of  2 x per week for 2 weeks  []Continue therapy with the following recommended changes:_____________________ _____________________________ ________________________________________  []Discontinue therapy progressing towards or have reached established goals  []Discontinue therapy due to lack of appreciable progress towards goals  []Discontinue therapy due to lack of attendance or compliance  []Await Physician's recommendations/decisions regarding therapy  []Other:________________________________________________________________    Thank you for this referral.   Rhoda ZOE Small, PT 6/18/2019 2:08 PM    NOTE TO PHYSICIAN:  PLEASE COMPLETE THE ORDERS BELOW AND   FAX TO [facility]: [phone]  If you are unable to process this request in 24 hours please contact our office: [phone]      ____ I have read the above report and request that my patient continue therapy with the following changes/special instructions:  ____ I have read the above report and request that my patient be discharged from therapy    Physician's Signature:____________Date:_________TIME:________    ** Signature, Date and Time must be completed for valid certification **

## 2019-06-21 ENCOUNTER — HOSPITAL ENCOUNTER (OUTPATIENT)
Dept: PHYSICAL THERAPY | Age: 64
Discharge: HOME | End: 2019-06-21
Payer: OTHER MISCELLANEOUS

## 2019-06-21 PROCEDURE — 97110 THERAPEUTIC EXERCISES: CPT

## 2019-06-21 NOTE — PROGRESS NOTES
PT DAILY TREATMENT NOTE    Patient Name: Giulia Parikh  Date:2019  : 1955  [x]  Patient  Verified  Payor: Trever Paul Eduardolashanda [phone] / Plan: [address] / Product Type: Workers Comp /    In time:10:35  Out time:11:33  Total Treatment Time (min): 58  Total Timed Codes (min): NA  1:1 Treatment Time ( only): NA   Visit #: 8 of 12    Treatment Area: Left hip pain [M25.552]  Right wrist pain [M25.531]  Right knee pain [M25.561]    SUBJECTIVE  Pain Level (0-10 scale): 6/10  Any medication changes, allergies to medications, adverse drug reactions, diagnosis change, or new procedure performed?: [x] No    [] Yes (see summary sheet for update)  Subjective functional status/changes:   [] No changes reported  \"It hurts some today. \"    OBJECTIVE    10 min [x]  Ice     [x]  Heat    location/position:    [x] Skin assessment post-treatment (if applicable):    [x]  intact    []  redness- no adverse reaction     []redness - adverse reaction:          48 min Therapeutic Exercise:  [x] See flow sheet :   Rationale: increase ROM, increase strength and improve coordination to improve the patients ability to return to prior level of physical activity. With   [] TE   [] TA   [] neuro   [] other: Patient Education: [x] Review HEP    [] Progressed/Changed HEP based on:   [] positioning   [] body mechanics   [] transfers   [] heat/ice application    [] other:      Other Objective/Functional Measures:      Pain Level (0-10 scale) post treatment: 5/10    ASSESSMENT/Changes in Function: Pt continues to show antalgic movement during ex but not above tolernace. Pt also continues to require VC to discourage valsalva compensation which tends to increase pain with ex. Pt received modalities post tx.      Patient will continue to benefit from skilled PT services to modify and progress therapeutic interventions, address functional mobility deficits, address ROM deficits, address strength deficits, analyze and address soft tissue restrictions, analyze and cue movement patterns, analyze and modify body mechanics/ergonomics and assess and modify postural abnormalities to attain remaining goals. []  See Plan of Care  []  See progress note/recertification  []  See Discharge Summary         Progress towards goals / Updated goals:  Short Term Goals: STG- To be accomplished in 3 week(s):  1.  Pt will be compliant with HEP for max progression toward all goals and return to PLOF. Eval:started on heel slides , SLR , single knee to chest and LTR , SLS balance ex given handout   Current: Pt reports intermittent compliance 6-18-19 NO CHANGE     2.Pt pain will improve >= 40% to allow pt improved sleep and tolerance to daily activity. Eval:8/10 max   Current: Pt reports 8-9/10 NO CHANGE 6-18-19     3. Pt FOTO scores will increase by >=6 points to show improvement in functional mobility. Eval:39 knee right  , 38 hip left  Current: 42 hip, 37 knee SLIGHT IMPROVEMENT 6-18-19     Long Term Goals: LTG- To be accomplished in 6 week(s):     1.  Pt will be independant with HEP for continued and ongoing progression following discharge toward full functional mobility. Eval:started on HEP   Current:  See above  2. Pt pain will improve >= 80% to allow pt return to PLOF. Eval:8 max  Current: See above      3. Pt FOTO scores will increase by >=12 points to show improvement in functional mobility. Eval:39 knee right  , 38 hip left  Current: see above     4.  Pt strength will improve to 5/5 and  SLR to 20 reps to allow pt to return to prior level of function and full work duties in [employer]  Eval:                 Strength          Knee Left Right   Ext 4+ 4+   Flex 5 5                                Strength   Hip Left Right   Flex 4+ 4   ADD 5 5   ABD 5 + pain in left hip  5   ER 4+ 4+ pain in right knee       Tolerated 6 reps only SLR on right ( pain at knee ) ,   3x only with left pain in hip difficulty keeping knee straight  Current: 3 on left with knee straight and 4 on right with knee straight, NO CHANGE WITH HIP FLEXION and 5/5 on knee extension SLIGHT IMPROVEMENT 6-18-19     5.  Pt will be able to bridge full height 10 reps with good core control noting improved core and glut strength   Eval:5 reps , decrease height just off table and + cramping in right HS  Current: Pt able to perform 10 bridges but not to full height 6-18-19 SLIGHT PROGRESS          PLAN  [x]  Upgrade activities as tolerated     [x]  Continue plan of care  []  Update interventions per flow sheet       []  Discharge due to:_  []  Other:_      Maximino Burgess PTA 6/21/2019  10:39 AM    Future Appointments   Date Time Provider Angel Lopez   6/24/2019  9:30 AM Adriana Ingram [facility]   6/25/2019  9:15 AM Cheryle Govern[facility]   7/1/2019  9:00 AM Cora Barlow [facility]   7/3/2019  8:30 AM Cora Barlow [facility]   7/8/2019  9:30 AM Cora Barlow [facility]   7/11/2019  8:00 AM Cora Barlow [facility]   7/16/2019  8:00 AM Mohan Barger PT [facility]   7/19/2019  8:00 AM Mohan Barger PT [facility]

## 2019-06-24 ENCOUNTER — HOSPITAL ENCOUNTER (OUTPATIENT)
Dept: PHYSICAL THERAPY | Age: 64
Discharge: HOME | End: 2019-06-24
Payer: OTHER MISCELLANEOUS

## 2019-06-24 PROCEDURE — 97110 THERAPEUTIC EXERCISES: CPT

## 2019-06-24 NOTE — PROGRESS NOTES
PT DAILY TREATMENT NOTE    Patient Name: Anabel Sandhoff  Date:2019  : 1955  [x]  Patient  Verified  Payor: Trever Yana Humberto Rasmussen [id number] / Plan: [id number] / Product Type: Workers Comp /    In time:11:00  Out time:11:50  Total Treatment Time (min): 50  Total Timed Codes (min): NA  1:1 Treatment Time ( only): NA   Visit #: 9 of 12    Treatment Area: Left hip pain [M25.552]  Right wrist pain [M25.531]  Right knee pain [M25.561]    SUBJECTIVE  Pain Level (0-10 scale): 1-2/10  Any medication changes, allergies to medications, adverse drug reactions, diagnosis change, or new procedure performed?: [x] No    [] Yes (see summary sheet for update)  Subjective functional status/changes:   [] No changes reported  \"I don't have any pain right now. \"    OBJECTIVE    Modalities Rationale:     decrease edema, decrease inflammation and decrease pain to improve patient's ability to return to prior level of physical activity. 10 min []  Ice     [x]  Heat    location/position: Supine/Left hip and B Knees   [x] Skin assessment post-treatment (if applicable):    [x]  intact    []  redness- no adverse reaction     []redness - adverse reaction:          40 min Therapeutic Exercise:  [x] See flow sheet :   Rationale: increase ROM, increase strength and improve coordination to improve the patients ability to return to prior level of physical activity. With   [] TE   [] TA   [] neuro   [] other: Patient Education: [x] Review HEP    [] Progressed/Changed HEP based on:   [] positioning   [] body mechanics   [] transfers   [] heat/ice application    [] other:      Other Objective/Functional Measures:      Pain Level (0-10 scale) post treatment: 7/10    ASSESSMENT/Changes in Function: Pt tolerated session moderately well. Pt continues to be able to perform exercise with moderate pain. Pt reported lack of participation with HEP in recent weeks.  Pt requested new copy of HEP due to having misplacing their copy. Pt reported no change in pain level post tx. Patient will continue to benefit from skilled PT services to modify and progress therapeutic interventions, address functional mobility deficits, address ROM deficits, address strength deficits, analyze and address soft tissue restrictions, analyze and cue movement patterns, analyze and modify body mechanics/ergonomics and assess and modify postural abnormalities to attain remaining goals. []  See Plan of Care  []  See progress note/recertification  []  See Discharge Summary         Progress towards goals / Updated goals:  Short Term Goals: STG- To be accomplished in 3 week(s):  1.  Pt will be compliant with HEP for max progression toward all goals and return to PLOF. Eval:started on heel slides , SLR , single knee to chest and LTR , SLS balance ex given handout   Current Pt given another copy of HEP due to having lost their copy 6/24/19 No Change     2. Pt pain will improve >= 40% to allow pt improved sleep and tolerance to daily activity. Eval:8/10 max   Current: Pt reports 8-9/10 NO CHANGE 6-18-19     3. Pt FOTO scores will increase by >=6 points to show improvement in functional mobility. Eval:39 knee right  , 38 hip left  Current: 42 hip, 37 knee SLIGHT IMPROVEMENT 6-18-19     Long Term Goals: LTG- To be accomplished in 6 week(s):     1.  Pt will be independant with HEP for continued and ongoing progression following discharge toward full functional mobility. Eval:started on HEP   Current:  See above  2. Pt pain will improve >= 80% to allow pt return to PLOF. Eval:8 max  Current: See above      3. Pt FOTO scores will increase by >=12 points to show improvement in functional mobility. Eval:39 knee right  , 38 hip left  Current: see above     4.  Pt strength will improve to 5/5 and  SLR to 20 reps to allow pt to return to prior level of function and full work duties in [employer] [employer]  Eval:                 Strength          Knee Left Right Ext 4+ 4+   Flex 5 5                                Strength   Hip Left Right   Flex 4+ 4   ADD 5 5   ABD 5 + pain in left hip  5   ER 4+ 4+ pain in right knee       Tolerated 6 reps only SLR on right ( pain at knee ) ,   3x only with left pain in hip difficulty keeping knee straight  Current: 3 on left with knee straight and 4 on right with knee straight, NO CHANGE WITH HIP FLEXION and 5/5 on knee extension SLIGHT IMPROVEMENT 6-18-19     5.  Pt will be able to bridge full height 10 reps with good core control noting improved core and glut strength   Eval:5 reps , decrease height just off table and + cramping in right HS  Current: Pt able to perform 10 bridges but not to full height 6-18-19 SLIGHT PROGRESS          PLAN  [x]  Upgrade activities as tolerated     [x]  Continue plan of care  []  Update interventions per flow sheet       []  Discharge due to:_  []  Other:_      Maximino Burgess PTA 6/24/2019  11:14 AM    Future Appointments   Date Time Provider Angel Lopez   6/25/2019  9:15 AM Cheryle Govern, [facility]   7/1/2019  9:00 AM Cora Barlow [facility]   7/3/2019  8:30 AM Cora Barlow [facility]   7/8/2019  9:30 AM Cora Barlow [facility]   7/11/2019  8:00 AM Cora Barlow [facility]   7/16/2019  8:00 AM Mohan Barger PT [facility]   7/19/2019  8:00 AM Mohan Barger PT [facility]

## 2019-06-25 ENCOUNTER — HOSPITAL ENCOUNTER (OUTPATIENT)
Dept: PHYSICAL THERAPY | Age: 64
Discharge: HOME | End: 2019-06-25
Payer: OTHER MISCELLANEOUS

## 2019-06-25 PROCEDURE — 97110 THERAPEUTIC EXERCISES: CPT

## 2019-06-25 NOTE — PROGRESS NOTES
PT DAILY TREATMENT NOTE    Patient Name: Nestor Mathew  Date:2019  : 1955  [x]  Patient  Verified  Payor: Zeina Quinones / Plan: Nick Martinez / Product Type: PPO /    In time:922  Out time:1015  Total Treatment Time (min): 53  Total Timed Codes (min): 43  1:1 Treatment Time ( only): 37   Visit #: 10 of 15    Treatment Area: Left hip pain [M25.552]  Right wrist pain [M25.531]  Right knee pain [M25.561]    SUBJECTIVE  Pain Level (0-10 scale): 6.5/10  Any medication changes, allergies to medications, adverse drug reactions, diagnosis change, or new procedure performed?: [x] No    [] Yes (see summary sheet for update)  Subjective functional status/changes:   [] No changes reported  Pt reports that her MD has scheduled an MRI of her hip scheduled for .     OBJECTIVE    Modalities Rationale:     decrease pain and increase tissue extensibility to improve patient's ability to ambulate community distances   min [] Estim, type/location:                                      []  att     []  unatt     []  w/US     []  w/ice    []  w/heat    min []  Mechanical Traction: type/lbs                   []  pro   []  sup   []  int   []  cont    []  before manual    []  after manual    min []  Ultrasound, settings/location:      min []  Iontophoresis w/ dexamethasone, location:                                               []  take home patch       []  in clinic   10 min []  Ice     [x]  Heat    location/position: Supine hip    min []  Vasopneumatic Device, press/temp:     min []  Other:    [x] Skin assessment post-treatment (if applicable):    [x]  intact    []  redness- no adverse reaction     []redness - adverse reaction:          43 min Therapeutic Exercise:  [] See flow sheet :   Rationale: increase ROM, increase strength, improve coordination, improve balance and increase proprioception to improve the patients ability to ambulate community distances          With   [x] TE   [] TA   [] neuro   [] other: Patient Education: [x] Review HEP    [] Progressed/Changed HEP based on:   [x] positioning   [x] body mechanics   [] transfers   [] heat/ice application    [] other:      Other Objective/Functional Measures:      Pain Level (0-10 scale) post treatment: 3/10    ASSESSMENT/Changes in Function: Pt continues to be challenged with strength and mobility. Attempted to increase challenge with stairs, lateral band walks and sit to stands however unable to complete all tasks at low levels of reps. Patient will continue to benefit from skilled PT services to address functional mobility deficits, address ROM deficits, address strength deficits, analyze and address soft tissue restrictions, analyze and cue movement patterns, analyze and modify body mechanics/ergonomics, assess and modify postural abnormalities and address imbalance/dizziness to attain remaining goals. []  See Plan of Care  []  See progress note/recertification  []  See Discharge Summary         Progress towards goals / Updated goals:  Short Term Goals: STG- To be accomplished in 3 week(s):  1.  Pt will be compliant with HEP for max progression toward all goals and return to PLOF. Eval:started on heel slides , SLR , single knee to chest and LTR , SLS balance ex given handout   Current Pt given another copy of HEP due to having lost their copy 6/24/19 No Change     2. Pt pain will improve >= 40% to allow pt improved sleep and tolerance to daily activity. Eval:8/10 max   Current: Pt reports 8-9/10 NO CHANGE 6-18-19     3. Pt FOTO scores will increase by >=6 points to show improvement in functional mobility. Eval:39 knee right  , 38 hip left  Current: 42 hip, 37 knee SLIGHT IMPROVEMENT 6-18-19     Long Term Goals: LTG- To be accomplished in 6 week(s):     1.  Pt will be independant with HEP for continued and ongoing progression following discharge toward full functional mobility. Eval:started on HEP   Current:  See above  2. Pt pain will improve >= 80% to allow pt return to PLOF. Eval:8 max  Current: See above      3. Pt FOTO scores will increase by >=12 points to show improvement in functional mobility. Eval:39 knee right  , 38 hip left  Current: see above     4.  Pt strength will improve to 5/5 and  SLR to 20 reps to allow pt to return to prior level of function and full work duties in [employer] [employer] [facility]  Eval:                 Strength          Knee Left Right   Ext 4+ 4+   Flex 5 5                                Strength   Hip Left Right   Flex 4+ 4   ADD 5 5   ABD 5 + pain in left hip  5   ER 4+ 4+ pain in right knee       Tolerated 6 reps only SLR on right ( pain at knee ) ,   3x only with left pain in hip difficulty keeping knee straight  Current: 3 on left with knee straight and 4 on right with knee straight, NO CHANGE WITH HIP FLEXION and 5/5 on knee extension SLIGHT IMPROVEMENT 6-18-19     5.  Pt will be able to bridge full height 10 reps with good core control noting improved core and glut strength   Eval:5 reps , decrease height just off table and + cramping in right HS  Current: Pt able to perform 10 bridges but not to full height 6-18-19 SLIGHT PROGRESS             PLAN  []  Upgrade activities as tolerated     []  Continue plan of care  []  Update interventions per flow sheet       []  Discharge due to:_  []  Other:_      Pearlene Merlin, PTA 6/25/2019  9:38 AM    Future Appointments   Date Time Provider Angel Lopez   7/1/2019  9:00 AM Scott [facility]   7/1/2019  3:15 PM [facility] MRI RM 1 [facility]   7/1/2019  4:00 PM [facility] MRI RM 1 [facility]   7/1/2019  4:45 PM [facility] MRI RM 1 [facility]   7/3/2019  8:30 AM Katarina Carvalho [facility]   7/8/2019  9:30 AM Katarina Carvalho [facility]   7/11/2019  8:00 AM Katarina Carvalho PTA [facility]   7/16/2019  8:00 AM Danica Handing, PT [facility]   7/19/2019  8:00 AM Danica Beckfording, PT [facility]

## 2019-07-01 ENCOUNTER — HOSPITAL ENCOUNTER (OUTPATIENT)
Dept: PHYSICAL THERAPY | Age: 64
Discharge: HOME | End: 2019-07-01
Payer: OTHER MISCELLANEOUS

## 2019-07-01 ENCOUNTER — HOSPITAL ENCOUNTER (OUTPATIENT)
Dept: MRI IMAGING | Age: 64
Discharge: HOME | End: 2019-07-01
Attending: PREVENTIVE MEDICINE
Payer: OTHER MISCELLANEOUS

## 2019-07-01 DIAGNOSIS — S39.012A STRAIN OF BACK: ICD-10-CM

## 2019-07-01 DIAGNOSIS — S70.02XD CONTUSION OF LEFT HIP, SUBSEQUENT ENCOUNTER: ICD-10-CM

## 2019-07-01 DIAGNOSIS — S80.01XD CONTUSION OF RIGHT KNEE, SUBSEQUENT ENCOUNTER: ICD-10-CM

## 2019-07-01 PROCEDURE — 97110 THERAPEUTIC EXERCISES: CPT

## 2019-07-01 PROCEDURE — 73721 MRI JNT OF LWR EXTRE W/O DYE: CPT

## 2019-07-01 PROCEDURE — 72148 MRI LUMBAR SPINE W/O DYE: CPT

## 2019-07-01 NOTE — PROGRESS NOTES
PT DAILY TREATMENT NOTE    Patient Name: Zehra Suárez  Date:2019  : 1955  [x]  Patient  Verified  Payor: Paulette Rolon / Plan: Porfirio Foote / Product Type: PPO /    In time:8:59  Out time:9:45  Total Treatment Time (min): 46  Total Timed Codes (min): NA  1:1 Treatment Time ( only): NA   Visit #: 11 of 12    Treatment Area: Left hip pain [M25.552]  Right wrist pain [M25.531]  Right knee pain [M25.561]    SUBJECTIVE  Pain Level (0-10 scale): 0/10  Any medication changes, allergies to medications, adverse drug reactions, diagnosis change, or new procedure performed?: [x] No    [] Yes (see summary sheet for update)  Subjective functional status/changes:   [] No changes reported  \"I don't have any pain today. \"    OBJECTIVE      46 min Therapeutic Exercise:  [x] See flow sheet :   Rationale: increase ROM, increase strength and improve coordination to improve the patients ability to return to prior level of physical activity. With   [] TE   [] TA   [] neuro   [] other: Patient Education: [x] Review HEP    [] Progressed/Changed HEP based on:   [] positioning   [] body mechanics   [] transfers   [] heat/ice application    [] other:      Other Objective/Functional Measures:      Pain Level (0-10 scale) post treatment: 0/10    ASSESSMENT/Changes in Function: Pt tolerated session well with no increased pain. Pt continues to be challenged by therex and shows moderate strength deficits. Pt continues to report non-regular compliance with HEP. Pt is being discharged from PT due to lack of significant improvement, back to MD for future POC.     Patient will continue to benefit from skilled PT services to modify and progress therapeutic interventions, address functional mobility deficits, address ROM deficits, address strength deficits, analyze and address soft tissue restrictions, analyze and cue movement patterns, analyze and modify body mechanics/ergonomics and assess and modify postural abnormalities to attain remaining goals. []  See Plan of Care  []  See progress note/recertification  []  See Discharge Summary         Progress towards goals / Updated goals:  Short Term Goals: STG- To be accomplished in 3 week(s):  1.  Pt will be compliant with HEP for max progression toward all goals and return to PLOF. Eval:started on heel slides , SLR , single knee to chest and LTR , SLS balance ex given handout   Current:Pt reported continued intermittent compliance but unable to report consistent participation 7/1/19     2. Pt pain will improve >= 40% to allow pt improved sleep and tolerance to daily activity. Eval:8/10 max   Current: Pt reports 7-8/10 at highest 7/1/19      3. Pt FOTO scores will increase by >=6 points to show improvement in functional mobility. Eval:39 knee right  , 38 hip left  Current: 42 hip, 37 knee SLIGHT IMPROVEMENT 6-18-19     Long Term Goals: LTG- To be accomplished in 6 week(s):     1.  Pt will be independant with HEP for continued and ongoing progression following discharge toward full functional mobility. Eval:started on HEP   Current:  See above 7/1/19    2. Pt pain will improve >= 80% to allow pt return to PLOF. Eval:8 max  Current: See above 7/1/19     3. Pt FOTO scores will increase by >=12 points to show improvement in functional mobility. Eval:39 knee right  , 38 hip left  Current: see above     4.  Pt strength will improve to 5/5 and  SLR to 20 reps to allow pt to return to prior level of function and full work duties in [employer]  Eval:                 Strength          Knee Left Right   Ext 4+ 4+   Flex 5 5                                Strength   Hip Left Right   Flex 4+ 4   ADD 5 5   ABD 5 + pain in left hip  5   ER 4+ 4+ pain in right knee       Tolerated 6 reps only SLR on right ( pain at knee ) ,   3x only with left pain in hip difficulty keeping knee straight  Current: Knee: Ext: Left: 5/5, Right: 4/4,     Flex: Left: 5/5, Right: 4+/5      Hip: Flex: Left: 4/4, Right: 4/4    Abd: 5/5 B (minor pain in Left Hip)    Add: 5/5 B    Ext: 5/5 B Mild improvement 7/1/19        5.  Pt will be able to bridge full height 10 reps with good core control noting improved core and glut strength   Eval:5 reps , decrease height just off table and + cramping in right HS  Current: Pt performs 2 sets of 10 bridges with rest break in between but not to full height 7/1/19             PLAN  [x]  Upgrade activities as tolerated     [x]  Continue plan of care  []  Update interventions per flow sheet       []  Discharge due to:_  []  Other:_      Lina Brown PTA 7/1/2019  9:17 AM    Future Appointments   Date Time Provider Angel Lopez   7/1/2019  3:15 [facility]   7/1/2019  4:00 PM [facility] MRI RM 1 [facility]   7/1/2019  4:45 PM [facility] MRI RM 1 [facility]   7/3/2019  8:30 AM Micheal Espinoza [facility]   7/8/2019  9:30 AM Micheal Espinoza [facility]   7/11/2019  8:00 AM Micheal Espinoza [facility]   7/16/2019  8:00 AM Micheal Espinoza [facility]   7/19/2019  8:00 AM [facility]

## 2019-07-03 ENCOUNTER — APPOINTMENT (OUTPATIENT)
Dept: PHYSICAL THERAPY | Age: 64
End: 2019-07-03
Payer: OTHER MISCELLANEOUS

## 2019-07-08 ENCOUNTER — APPOINTMENT (OUTPATIENT)
Dept: PHYSICAL THERAPY | Age: 64
End: 2019-07-08
Payer: OTHER MISCELLANEOUS

## 2019-07-11 ENCOUNTER — APPOINTMENT (OUTPATIENT)
Dept: PHYSICAL THERAPY | Age: 64
End: 2019-07-11
Payer: OTHER MISCELLANEOUS

## 2019-07-16 ENCOUNTER — APPOINTMENT (OUTPATIENT)
Dept: PHYSICAL THERAPY | Age: 64
End: 2019-07-16
Payer: OTHER MISCELLANEOUS

## 2019-07-17 NOTE — PROGRESS NOTES
[facility]  [address], [address] [facility] [place]  Ph [phone]  Fx [phone]    Physical Therapy Discharge Summary    Patient name: Darryn Mitchell Start of Care: 19   Referral source: Zak Fairchild MD : 1955   Medical/Treatment Diagnosis: Left hip pain [M25.552]  Right wrist pain [M25.531]  Right knee pain [M25.561] Onset Date:19     Prior Hospitalization: see medical history Provider#: [id number]   Medications: Verified on Patient Summary List    Comorbidities: tobacco use 1 pp week, TKR in 2018 , surgery bilateral wrist and plantar fascitis surgery   Prior Level of Function:occasional knee pain limiting walking , pt is  very physically demanding job.     Visits from Start of Care: 11    Missed Visits: 0    Reporting Period : 19 to 19    Summary of Care:    Pt continues to report non-regular compliance with HEP. Pt is being discharged from PT due to lack of significant improvement, back to MD for future POC. She will require new order if further PT services are necessary. Short Term Goals: STG- To be accomplished in 3 week(s):  1.  Pt will be compliant with HEP for max progression toward all goals and return to PLOF. Eval:started on heel slides , SLR , single knee to chest and LTR , SLS balance ex given handout   Current:Pt reported continued intermittent compliance but unable to report consistent participation 19     2. Pt pain will improve >= 40% to allow pt improved sleep and tolerance to daily activity. Eval:8/10 max   Current: Pt reports 7-8/10 at highest 19      3. Pt FOTO scores will increase by >=6 points to show improvement in functional mobility. Eval:39 knee right  , 38 hip left  Current: 42 hip, 37 knee SLIGHT IMPROVEMENT 19     Long Term Goals: LTG- To be accomplished in 6 week(s):     1.  Pt will be independant with HEP for continued and ongoing progression following discharge toward full functional mobility. Eval:started on HEP   Current:  See above 7/1/19     2. Pt pain will improve >= 80% to allow pt return to PLOF. Eval:8 max  Current: See above 7/1/19     3. Pt FOTO scores will increase by >=12 points to show improvement in functional mobility. Eval:39 knee right  , 38 hip left  Current: see above     4.  Pt strength will improve to 5/5 and  SLR to 20 reps to allow pt to return to prior level of function and full work duties in [employer]  Eval:                 Strength          Knee Left Right   Ext 4+ 4+   Flex 5 5                                Strength   Hip Left Right   Flex 4+ 4   ADD 5 5   ABD 5 + pain in left hip  5   ER 4+ 4+ pain in right knee       Tolerated 6 reps only SLR on right ( pain at knee ) ,   3x only with left pain in hip difficulty keeping knee straight  Current: Knee: Ext: Left: 5/5, Right: 4/4,                            Flex: Left: 5/5, Right: 4+/5                 Hip:    Flex: Left: 4/4, Right: 4/4                           Abd: 5/5 B (minor pain in Left Hip)                           Add: 5/5 B                           Ext: 5/5 B Mild improvement 7/1/19                   5.  Pt will be able to bridge full height 10 reps with good core control noting improved core and glut strength   Eval:5 reps , decrease height just off table and + cramping in right HS  Current: Pt performs 2 sets of 10 bridges with rest break in between but not to full height 7/1/19    ASSESSMENT/RECOMMENDATIONS:  [x]Discontinue therapy: []Patient has reached or is progressing toward set goals      []Patient is non-compliant or has abdicated      [x]Due to lack of appreciable progress towards set goals    Rhoda Small, PT 7/17/2019 9:25 AM

## 2019-07-19 ENCOUNTER — APPOINTMENT (OUTPATIENT)
Dept: PHYSICAL THERAPY | Age: 64
End: 2019-07-19
Payer: OTHER MISCELLANEOUS

## 2020-08-13 ENCOUNTER — HOSPITAL ENCOUNTER (OUTPATIENT)
Dept: PHYSICAL THERAPY | Age: 65
Discharge: HOME | End: 2020-08-13
Payer: OTHER MISCELLANEOUS

## 2020-08-13 PROCEDURE — 97110 THERAPEUTIC EXERCISES: CPT

## 2020-08-13 PROCEDURE — 97162 PT EVAL MOD COMPLEX 30 MIN: CPT

## 2020-08-13 PROCEDURE — 97530 THERAPEUTIC ACTIVITIES: CPT

## 2020-08-13 NOTE — PROGRESS NOTES
[facility]  [address], [address] [facility] [address]  Ph [phone]  Fx [phone]    Plan of Care/ Statement of Necessity for Physical Therapy Services    Patient name: Stephania Cushing Start of Care: 2020   Referral source: Kaykay Carpenter MD : 1955    Medical Diagnosis: Right knee pain [M25.561]  Left hip pain [M25.552]   Onset Date:     Treatment Diagnosis: right knee pain M25.561, left hip pain M25.552                                     Prior Hospitalization: see medical history Provider#: [id number]   Medications: Verified on Patient summary List    Comorbidities: arthritis, tobacco use, right TKA , wears glasses   Prior Level of Function: functionally independent, no AD, active lifestyle      The Plan of Care and following information is based on the information from the initial evaluation. Assessment/ key information: 62 yo female who presents to THE [facility] with c/o right knee and left hip pain. Patient reports she had TKA in 2019 and then had fall where she slipped on some IV fluid at work and has had consistent pain and weakness in right knee and left hip since then. She reports she had partial tear in left hip when she fell and has had a couple of shots in L4-5 since then and they have helped the pain in the hip. Patient reports she had nerve ablation in right knee approximately 6 months ago and reports it didn't help much. She reports she has also had a couple of cortisone shots in right knee since the ablation. Pt demonstrates decreased flexibility, decreased ROM, decreased strength, impaired gait, impaired posture and pain. She would benefit from skilled PT intervention to address the above deficits.  Patient will continue to benefit from skilled PT services to modify and progress therapeutic interventions, address functional mobility deficits, address ROM deficits, address strength deficits, analyze and address soft tissue restrictions, analyze and cue movement patterns, analyze and modify body mechanics/ergonomics, assess and modify postural abnormalities, address imbalance/dizziness and instruct in home and community integration to attain remaining goals. Evaluation Complexity History MEDIUM  Complexity : 1-2 comorbidities / personal factors will impact the outcome/ POC ; Examination MEDIUM Complexity : 3 Standardized tests and measures addressing body structure, function, activity limitation and / or participation in recreation  ;Presentation MEDIUM Complexity : Evolving with changing characteristics  ; Clinical Decision Making MEDIUM Complexity : FOTO score of 26-74 : FOTO score = an established functional score where 100 = no disability  Overall Complexity Rating: MEDIUM  Problem List: pain affecting function, decrease ROM, decrease strength, edema affecting function, impaired gait/ balance, decrease ADL/ functional abilitiies, decrease activity tolerance, decrease flexibility/ joint mobility and decrease transfer abilities     Treatment Plan may include any combination of the following: Therapeutic exercise, Therapeutic activities, Neuromuscular re-education, Physical agent/modality, Gait/balance training, Manual therapy, Patient education, Self Care training, Functional mobility training, Home safety training and Stair training Patient / Family readiness to learn indicated by: asking questions, trying to perform skills and interest  Persons(s) to be included in education: patient (P)  Barriers to Learning/Limitations: None  Measures taken if barriers to learning: NA  Patient Goal (s): to be painfree enough to exercise so I can lose weight  Patient Self Reported Health Status: good  Rehabilitation Potential: good    Short Term Goals: To be accomplished in 2 weeks:  1. Patient will be independent and compliant with HEP to progress toward goals and restore functional mobility. Eval Status: issued at [facility]    2.  Patient will improve hip FOTO score by 5 points to improve functional tolerance for completing work duties without increased pain. Eval Status: FOTO 52    3. Patient will improve pain in left hip and right knee to 5/10 at worst  to improve ambulation tolerance and restore prior level of function. Eval Status: 8/10 at worst    4. Pt will have right knee ROM WFL to aid in functional mechanics for ambulation/ADLs. Eval Status:   Knee Left Right   Ext 0 -2   Flex 125 113       Long Term Goals: To be accomplished in 6 weeks:  1. Patient will improve knee FOTO score by 6 points to improve functional tolerance for walking for exercise. Eval Status: FOTO 47    2. Patient will have 5/5 bilateral LE strength to return to goals of walking for exercise and completing work duties without increased pain. Eval Status:   Hip L (1-5) R (1-5)   Hip Flexion 4 4   Hip Ext 4- 4-   Hip ABD 4- 4-   Hip ADD 4- 4-   Hip ER 4- 4-   Hip IR 4- 4-     Knee L (1-5) R (1-5)   Knee Flexion 4 4- P! Knee Extension 4 4- P! Ankle PF 4 4   Ankle DF 4 4   Other       3. Patient will improve pain in left hip and right knee to 1-2/10 at worst to improve ambulation tolerance and restore prior level of function. Eval Status: 8/10 at worst   Frequency / Duration: Patient to be seen 2 times per week for 6 weeks. Patient/ Caregiver education and instruction: Diagnosis, prognosis, self care, activity modification, brace/ splint application and exercises   [x]  Plan of care has been reviewed with PTA    Certification Period: JENNIFER Small, PT 8/13/2020 3:45 PM    ________________________________________________________________________    I certify that the above Therapy Services are being furnished while the patient is under my care. I agree with the treatment plan and certify that this therapy is necessary.     [de-identified] Signature:_____________________Date:____________TIME:________    [facility], Date and Time must be completed for valid certification **  Please sign and return to [facility]  [address], [address] [facility] [address]  Ph [phone]  Fx [phone]

## 2020-08-13 NOTE — PROGRESS NOTES
PT DAILY TREATMENT NOTE/Hip/Knee/Ankle EVAL 10-18    Patient Name: Tariq Fields  Date:2020  : 1955  [x]  Patient  Verified  Payor: Ella Sat / Plan: [id number] [facility] / Product Type: Workers Comp /    In [address]  Total Treatment Time (min): 60  Visit #: 1 of 12    Medicare/BCBS Only   Total Timed Codes (min):  45 1:1 Treatment Time:  60       Treatment Area: Right knee pain [M25.561]  Left hip pain [M25.552]    SUBJECTIVE  Pain Level (0-10 scale): 6/10  []constant []intermittent []improving []worsening []no change since onset    Any medication changes, allergies to medications, adverse drug reactions, diagnosis change, or new procedure performed?: [x] No    [] Yes (see summary sheet for update)  Subjective functional status/changes:     PLOF: functionally independent, no AD, active lifestyle  Limitations to PLOF: pain, weakness  Mechanism of Injury: pt had TKA in 2019 and then had fall where she slipped on some IV fluid at work and has had consistent pain and weakness in right knee and left hip since then. She reports she had partial tear in left hip when she fell and has had a couple of shots in L4-5 since then and they have helped the painin the hip. Patient reports she had nerve ablation in right knee approximately 6 months ago and reports it didn't help much. She reports she has also had a couple of cortisone shots in right knee since the ablation.    Current symptoms/Complaints: 8/10 pain at worst with prolonged standing, walking, squatting, stooping and lifting; 1/10 at best with laying in supine with right leg stretched out straight  Previous Treatment/Compliance: uses ice pack regularly, diclofenac PO and cream, tylenol  PMHx/Surgical Hx: arthritis, tobacco use, right TKA , wears glasses  Work Hx: works for [employer] full time  Living Situation: one story home with no PRASHANT   Pt Goals: \"to be painfree enough to exercise so I can lose weight\"  Barriers: [x]pain []financial []time []transportation []other  Motivation: good  Substance use: []Alcohol [x]Tobacco []other:   Cognition: A & O x 3       OBJECTIVE    15 min [x]Eval                  []Re-Eval       25 min Therapeutic Exercise:  [] See flow sheet :   Rationale: increase ROM and increase strength to improve the patients ability to restore PLOF    20 min Therapeutic Activity:  []  See flow sheet :   Rationale: increase strength and improve coordination  to improve the patients ability to restore PLOF     With   [x] TE   [x] TA   [] neuro   [] other: Patient Education: [x] Review HEP    [] Progressed/Changed HEP based on:   [] positioning   [] body mechanics   [] transfers   [] heat/ice application    [] other:        General Evaluation    Gait:  Markedly antalgic gait   Stairs: step-to gait with bearing weight on    Palpation/Sensation: TTP with right patellar mobilizations    ROM:                                        AROM       Knee Left Right   Ext 0 -2   Flex 125 113             AROM                             Hip Left Right   Flex [facility] WFL   Ext     ABD     ER     IR                                          Strength (MMT):                                            Hip L (1-5) R (1-5)   Hip Flexion 4 4   Hip Ext 4- 4-   Hip ABD 4- 4-   Hip ADD 4- 4-   Hip ER 4- 4-   Hip IR 4- 4-     Knee L (1-5) R (1-5)   Knee Flexion 4 4- P! Knee Extension 4 4- P!    Ankle PF 4 4   Ankle DF 4 4   Other       Special Tests:    Knee  Left Right   Varus Stress Test - -   Valgus Stress Test - -   Lachman's - -   Apprehension - -   Charlene's - -   Posterior Sag - -   Patellar Grind - -   Single Leg Squat - -                    Hip Left Right   Theron Test + +   Obers + +   Scours - -   Sanjiv - -            Other Tests / Comments: tight HS, tight quads, tight hip flexors       Pain Level (0-10 scale) post treatment: 6/10    ASSESSMENT/Changes in Function: 64 yo female who presents to [facility] with c/o right knee and left hip pain. Patient reports she had TKA in January 2019 and then had fall where she slipped on some IV fluid at work and has had consistent pain and weakness in right knee and left hip since then. She reports she had partial tear in left hip when she fell and has had a couple of shots in L4-5 since then and they have helped the pain in the hip. Patient reports she had nerve ablation in right knee approximately 6 months ago and reports it didn't help much. She reports she has also had a couple of cortisone shots in right knee since the ablation. Pt demonstrates decreased flexibility, decreased ROM, decreased strength, impaired gait, impaired posture and pain. She would benefit from skilled PT intervention to address the above deficits. Patient will continue to benefit from skilled PT services to modify and progress therapeutic interventions, address functional mobility deficits, address ROM deficits, address strength deficits, analyze and address soft tissue restrictions, analyze and cue movement patterns, analyze and modify body mechanics/ergonomics, assess and modify postural abnormalities, address imbalance/dizziness and instruct in home and community integration to attain remaining goals. [x]  See Plan of Care  []  See progress note/recertification  []  See Discharge Summary         Progress towards goals / Updated goals:    Short Term Goals: To be accomplished in 2 weeks:  1. Patient will be independent and compliant with HEP to progress toward goals and restore functional mobility. Eval Status: issued at [address]    2. Patient will improve hip FOTO score by 5 points to improve functional tolerance for completing work duties without increased pain. Eval Status: FOTO 52    3. Patient will improve pain in left hip and right knee to 5/10 at worst  to improve ambulation tolerance and restore prior level of function. Eval Status: 8/10 at worst    4.  Pt will have right knee ROM WFL to aid in functional mechanics for ambulation/ADLs. Eval Status:   Knee Left Right   Ext 0 -2   Flex 125 113       Long Term Goals: To be accomplished in 6 weeks:  1. Patient will improve knee FOTO score by 6 points to improve functional tolerance for walking for exercise. Eval Status: FOTO 47    2. Patient will have 5/5 bilateral LE strength to return to goals of walking for exercise and completing work duties without increased pain. Eval Status:   Hip L (1-5) R (1-5)   Hip Flexion 4 4   Hip Ext 4- 4-   Hip ABD 4- 4-   Hip ADD 4- 4-   Hip ER 4- 4-   Hip IR 4- 4-     Knee L (1-5) R (1-5)   Knee Flexion 4 4- P! Knee Extension 4 4- P! Ankle PF 4 4   Ankle DF 4 4   Other       3. Patient will improve pain in left hip and right knee to 1-2/10 at worst to improve ambulation tolerance and restore prior level of function.   Eval Status: 8/10 at worst       PLAN  []  Upgrade activities as tolerated     [x]  Continue plan of care  []  Update interventions per flow sheet       []  Discharge due to:_  []  Other:_      Tia Knight, PT 8/13/2020  2:32 PM

## 2020-08-14 ENCOUNTER — EMPLOYEE WELLNESS (OUTPATIENT)
Dept: FAMILY MEDICINE CLINIC | Age: 65
End: 2020-08-14

## 2020-08-14 ENCOUNTER — APPOINTMENT (OUTPATIENT)
Dept: PHYSICAL THERAPY | Age: 65
End: 2020-08-14

## 2020-08-14 LAB
CHOLEST SERPL-MCNC: 183 MG/DL
GLUCOSE SERPL-MCNC: 104 MG/DL (ref 74–99)
HDLC SERPL-MCNC: 76 MG/DL (ref 40–60)
LDLC SERPL CALC-MCNC: 91.8 MG/DL (ref 0–100)
TRIGL SERPL-MCNC: 76 MG/DL (ref ?–150)

## 2020-08-17 ENCOUNTER — HOSPITAL ENCOUNTER (OUTPATIENT)
Dept: PHYSICAL THERAPY | Age: 65
Discharge: HOME | End: 2020-08-17
Payer: COMMERCIAL

## 2020-08-17 PROCEDURE — 97162 PT EVAL MOD COMPLEX 30 MIN: CPT

## 2020-08-17 PROCEDURE — 97530 THERAPEUTIC ACTIVITIES: CPT

## 2020-08-17 PROCEDURE — 97110 THERAPEUTIC EXERCISES: CPT

## 2020-08-17 NOTE — PROGRESS NOTES
[facility]  [address], [address] [facility] [address]  Ph [phone]  Fx [phone]    Plan of Care/ Statement of Necessity for Physical Therapy Services    Patient name: Katy Hinkle Start of Care: 2020   Referral source: Tai Pressley MD : 1955    Medical Diagnosis: Right shoulder pain [M25.511]   Onset Date:2020   Treatment Diagnosis: right shoulder pain                                             ICD-10: M25.511   Prior Hospitalization: see medical history Provider#: [id number]   Medications: Verified on Patient summary List    Comorbidities: right TKR   Prior Level of Function: independent with ADLs      The Plan of Care and following information is based on the information from the initial evaluation. Assessment/ key information:     63year old female with recent acute right shoulder pain that has progressively gotten worse over the past month; patient has pain with all AROM pain with palpation of right shoulder and right shoulder girdle; special test are positive for right RTC impingement and right bicep tendoniits; demonstrates decreased AROM, decreased muscle recruitment, decreased scapular mobility, increased right upper trapezius substitution; and increased pain; all limiting functional mobility     Patient will continue to benefit from skilled PT services to modify and progress therapeutic interventions, address functional mobility deficits, address ROM deficits, address strength deficits, analyze and address soft tissue restrictions, analyze and cue movement patterns, analyze and modify body mechanics/ergonomics, assess and modify postural abnormalities and instruct in home and community integration to attain remaining goals.     Evaluation Complexity History MEDIUM  Complexity : 1-2 comorbidities / personal factors will impact the outcome/ POC ; Examination MEDIUM Complexity : 3 Standardized tests and measures addressing body structure, function, activity limitation and / or participation in recreation  ;Presentation MEDIUM Complexity : Evolving with changing characteristics  ; Clinical Decision Making MEDIUM Complexity : FOTO score of 26-74 : FOTO score = an established functional score where 100 = no disability  Overall Complexity Rating: MEDIUM  Problem List: pain affecting function, decrease ROM, decrease strength, decrease ADL/ functional abilitiies, decrease activity tolerance and decrease flexibility/ joint mobility   Treatment Plan may include any combination of the following: Therapeutic exercise, Therapeutic activities, Neuromuscular re-education, Physical agent/modality, Manual therapy, Patient education and Self Care training  Patient / Family readiness to learn indicated by: engaged in POC  Persons(s) to be included in education: patient  Barriers to Learning/Limitations: none  Measures taken if barriers to learning: N/A  Patient Goal (s): pain free motion right UE  Patient Self Reported Health Status:   Good  Rehabilitation Potential: fair due to inability to consistently come secondary to high co-pay and financial issues    Short Term Goals: To be accomplished in 2 weeks:  1. Patient will be compliant with HEP to progress toward goals and restore functional mobility. Eval Status: Issued at [employer]     2. Patient will improve FOTO score by 5  points to improve functional tolerance for exercise. Eval Status:50     3. Patient will improve pain in right UE  to 4/10  to improve reaching overhead stolerance and restore prior level of function. Eval Status: 8/10     4.  Pt will have 3/5 right UEstrength to return to goals of reaching overhead  Eval Status:  Strength (MMT):    L (1-5) R (1-5)   Shoulder flexion 5/5 2/5   Shoulder abduction 5/5 2/5   Shoulder ER 5/5 2/5   Shoulder IR 5/5 2/5                                            5. Pt will have right shoulder AROM by 10 degrees for all deficient ranges to aid in functional mechanics for ambulation/ADLs. Eval StatuROM:                                      AROM                                               Shoulder Left Right   Flex 150 145   Ext 30 neutral    70   ER 70 20   IR 65 50          Long Term Goals: To be accomplished in 4 weeks:  2. Patient will be independent with HEP to progress toward goals of independent and complaint. Eval Status:Issued at [facility]     3. Patient will improve FOTO score by 15 points to improve functional tolerance for lifting light objects overhead. Eval Status: 50     4. Lou Prescott will have right shoulder AROM by 20 degrees for all deficient ranges  to aid in functional mechanics for ambulation/ADLs. Eval Status:  ROM:                               AROM                                               Shoulder Left Right   Flex 150 145   Ext 30 neutral    70   ER 70 20   IR 65 50         5. Patient will have 5/5 right UEstrength to return to goals of reaching overhead to wash hair. Eval Status:  Strength (MMT):    L (1-5) R (1-5)   Shoulder flexion 5/5 2/5   Shoulder abduction 5/5 2/5   Shoulder ER 5/5 2/5   Shoulder IR 5/5 2/5         6. Patient will improve pain in right shoulder to 2/10 to improve reaching behind back with right UE tolerance and restore prior level of function. Eval Status: 8/10     Frequency / Duration: Patient to be seen 1 times per week for 4 weeks. Patient/ Caregiver education and instruction: Diagnosis, prognosis, self care, activity modification and exercises   [x]  Plan of care has been reviewed with PTA    Certification Period: N/A  Juan Jose Fernandez 8/17/2020 4:57 PM    ________________________________________________________________________    I certify that the above Therapy Services are being furnished while the patient is under my care. I agree with the treatment plan and certify that this therapy is necessary.     [phone] Signature:_____________________Date:____________TIME:________    [facility], Date and Time must be completed for valid certification **  Please sign and return to [facility]  [address], [address] [facility]  Ph [phone]  Fx [phone]

## 2020-08-17 NOTE — PROGRESS NOTES
PT DAILY TREATMENT NOTE/SHOULDER EVAL 10-18    Patient Name: Marizol Castañeda  Date:2020  : 1955  [x]  Patient  Verified  Payor: [address] / Plan: [facility] MEDICAL [place] [address] / Product Type: Commerical /    In time:10:24 Out time:11:15  Total Treatment Time (min):51  Visit #: 1of 4      Treatment Area: Right shoulder pain [M25.511]    SUBJECTIVE  Pain Level (0-10 scale): 8/10  []constant []intermittent []improving [x]worsening []no change since onset    Any medication changes, allergies to medications, adverse drug reactions, diagnosis change, or new procedure performed?: [x] No    [] Yes (see summary sheet for update)  Subjective functional status/changes:     PLOF: independent with ADLS  Limitations to PLOF: reports moving her right shoulder hurts with reaching overhead; reaching behind back; and lifting anything over 10 lbs; keeping awake at night and unable to lay on left or right  Mechanism of Injury: reports that she was lifting heavy bags of trash about a month ago; reports it gradually got worse  Current symptoms/Complaints: dull achy pain in right UE with intermittent sharp shooting pain  Previous Treatment/Compliance: injection helped for 2 weeks; taking pain medication  PMHx/Surgical Hx: right TKR;    Work Hx: works for environmental services for [employer]  Living Situation: lives with   Pt Goals: pain free motion in right UE  Barriers: []pain []financial []time []transportation []other  Motivation: good  Cognition: A & O x 3   Other:    OBJECTIVE    25 min [x]Eval                  []Re-Eval       10 min Therapeutic Exercise:  [x] See flow sheet :   Rationale: increase ROM, increase strength and improve coordination to improve the patients ability to reach overhead with right UE    15 min Therapeutic Activity:  [x]  See flow sheet :   Rationale: increase ROM, increase strength and improve coordination  to improve the patients ability to perform ADLS with right UE             With   [x] TE   [x] TA   [] neuro   [] other: Patient Education: [x] Review HEP    [] Progressed/Changed HEP based on:   [] positioning   [] body mechanics   [] transfers   [] heat/ice application    [] other:        General Evaluatio  Posture: bilateral rounded shoulders and a forward head  Palpation/Sensation: tenderness and pain I right proximal bicep tendon; tightness and myofascial restriction noted in right shoulder girdle, upper trapezius, and cervical paraspinals    ROM:                             AROM      Shoulder Left Right   Flex 150 145   Ext 30 neutral    70   ER 70 20   IR 65 50               Strength (MMT):   L (1-5) R (1-5)   Shoulder flexion 5/5 2/5   Shoulder abduction 5/5 2/5   Shoulder ER 5/5 2/5   Shoulder IR 5/5 2/5                                             Special Tests:  Shoulder Left Right   Relocation  NT   Speed's  (+)   Empty Can  (+)   Apprehension  (-)   spurling   (-)   compression  (-)   decompression  (-)   Impingement  (+)        Pain Level (0-10 scale) post treatment: 6/10    ASSESSMENT/Changes in Function: 63year old female with recent acute right shoulder pain that has progressively gotten worse over the past month; patient has pain with all AROM pain with palpation of right shoulder and right shoulder girdle; special test are positive for right RTC impingement and right bicep tendoniits; demonstrates decreased AROM, decreased muscle recruitment, decreased scapular mobility, increased right upper trapezius substitution; and increased pain; all limiting functional mobility    Patient will continue to benefit from skilled PT services to modify and progress therapeutic interventions, address functional mobility deficits, address ROM deficits, address strength deficits, analyze and address soft tissue restrictions, analyze and cue movement patterns, analyze and modify body mechanics/ergonomics, assess and modify postural abnormalities and instruct in home and community integration to attain remaining goals. [x]  See Plan of Care  []  See progress note/recertification  []  See Discharge Summary         Progress towards goals / Updated goals:    Short Term Goals: To be accomplished in 3 weeks:  1. Patient will be compliant with HEP to progress toward goals and restore functional mobility. Eval Status: Issued at [employer]    2. Patient will improve FOTO score by 5  points to improve functional tolerance for exercise. Eval Status:50    3. Patient will improve pain in right UE  to 4/10  to improve reaching overhead stolerance and restore prior level of function. Eval Status: 8/10    4. Pt will have 3/5 right UEstrength to return to goals of reaching overhead  Eval Status:  Strength (MMT):   L (1-5) R (1-5)   Shoulder flexion 5/5 2/5   Shoulder abduction 5/5 2/5   Shoulder ER 5/5 2/5   Shoulder IR 5/5 2/5                                           5. Pt will have right shoulder AROM by 10 degrees for all deficient ranges to aid in functional mechanics for ambulation/ADLs. Eval StatuROM:                             AROM      Shoulder Left Right   Flex 150 145   Ext 30 neutral    70   ER 70 20   IR 65 50   s:       Long Term Goals: To be accomplished in 6 weeks:  1. Patient will be independent with HEP to progress toward goals of independent and complaint. [facility]    2. Patient will improve FOTO score by 15 points to improve functional tolerance for lifting light objects overhead. Eval Status: 50    3. Torrey Epps will have right shoulder AROM by 20 degrees for all deficient ranges  to aid in functional mechanics for ambulation/ADLs. Eval Status:  ROM:                             AROM      Shoulder Left Right   Flex 150 145   Ext 30 neutral    70   ER 70 20   IR 65 50       4. Patient will have 5/5 right UEstrength to return to goals of reaching overhead to wash hair.   Eval Status:  Strength (MMT):   L (1-5) R (1-5)   Shoulder flexion 5/5 2/5   Shoulder abduction 5/5 2/5   Shoulder ER 5/5 2/5   Shoulder IR 5/5 2/5       5. Patient will improve pain in right shoulder to 2/10 to improve reaching behind back with right UE tolerance and restore prior level of function.   Eval Status: 8/10    PLAN  [x]  Upgrade activities as tolerated     [x]  Continue plan of care  []  Update interventions per flow sheet       []  Discharge due to:_  []  Other:_      Rex Lin 8/17/2020  10:24 AM

## 2020-08-21 ENCOUNTER — HOSPITAL ENCOUNTER (OUTPATIENT)
Dept: PHYSICAL THERAPY | Age: 65
Discharge: HOME | End: 2020-08-21
Payer: OTHER MISCELLANEOUS

## 2020-08-21 PROCEDURE — 97110 THERAPEUTIC EXERCISES: CPT

## 2020-08-21 NOTE — PROGRESS NOTES
PT DAILY TREATMENT NOTE    Patient Name: Haile Gandhi  Date:2020  : 1955  [x]  Patient  Verified  Payor: Adal Andrews / Plan: [id number] / Product Type: Workers Comp /    In [employer]  Out time:1530  Total Treatment Time (min): 45  Total Timed Codes (min): 45  1:1 Treatment Time ( only): 39   Visit #: 2 of 12    Treatment Area: Right knee pain [M25.561]  Left hip pain [M25.552]    SUBJECTIVE  Pain Level (0-10 scale): 5  Any medication changes, allergies to medications, adverse drug reactions, diagnosis change, or new procedure performed?: [x] No    [] Yes (see summary sheet for update)  Subjective functional status/changes:   [] No changes reported  Pt reports she did \"some of the stretches\" from her HEP    OBJECTIVE      45 min Therapeutic Exercise:  [] See flow sheet :   Rationale: increase ROM, increase strength and improve coordination to improve the patients ability to restore PLOF          With   [x] TE   [] TA   [] neuro   [] other: Patient Education: [x] Review HEP    [] Progressed/Changed HEP based on:   [] positioning   [] body mechanics   [] transfers   [] heat/ice application    [] other:      Other Objective/Functional Measures: NA     Pain Level (0-10 scale) post treatment: 2    ASSESSMENT/Changes in Function: Patient tolerated treatment session well today. No complaints with added therex for LE strengthening and to improve gait mechanics. Patient will continue to benefit from skilled PT services to modify and progress therapeutic interventions, address functional mobility deficits, address ROM deficits, address strength deficits, analyze and address soft tissue restrictions, analyze and cue movement patterns, analyze and modify body mechanics/ergonomics and assess and modify postural abnormalities to attain remaining goals.      [x]  See Plan of Care  []  See progress note/recertification  []  See Discharge Summary         Progress towards goals / Updated goals:  Short Term Goals: To be accomplished in 2 weeks:  1. Patient will be independent and compliant with HEP to progress toward goals and restore functional mobility. Eval Status: issued at [facility]  Current: reports she is doing \"some of the stretches\" 8/21/20     2. Patient will improve hip FOTO score by 5 points to improve functional tolerance for completing work duties without increased pain. Eval Status: FOTO 52     3. Patient will improve pain in left hip and right knee to 5/10 at worst  to improve ambulation tolerance and restore prior level of function. Eval Status: 8/10 at worst     4. Pt will have right knee ROM WFL to aid in functional mechanics for ambulation/ADLs. Eval Status:   Knee Left Right   Ext 0 -2   Flex 125 113         Long Term Goals: To be accomplished in 6 weeks:  1. Patient will improve knee FOTO score by 6 points to improve functional tolerance for walking for exercise. Eval Status: FOTO 47     2. Patient will have 5/5 bilateral LE strength to return to goals of walking for exercise and completing work duties without increased pain. Eval Status:   Hip L (1-5) R (1-5)   Hip Flexion 4 4   Hip Ext 4- 4-   Hip ABD 4- 4-   Hip ADD 4- 4-   Hip ER 4- 4-   Hip IR 4- 4-      Knee L (1-5) R (1-5)   Knee Flexion 4 4- P! Knee Extension 4 4- P! Ankle PF 4 4   Ankle DF 4 4   Other          3. Patient will improve pain in left hip and right knee to 1-2/10 at worst to improve ambulation tolerance and restore prior level of function.   Eval Status: 8/10 at worst       PLAN  []  Upgrade activities as tolerated     [x]  Continue plan of care  []  Update interventions per flow sheet       []  Discharge due to:_  []  Other:_      Shilo Laguna, PT 8/21/2020  4:08 PM    Future Appointments   Date Time Provider Angel Lopez   8/25/2020  2:45 PM [facility]   8/27/2020  2:15 PM [facility]   9/1/2020  2:45 PM [facility] 9/3/2020  2:45 PM [facility]   9/8/2020  2:45 PM [facility]   9/10/2020  2:45 PM Juan Francisco Charles PT [facility] THE [facility]

## 2020-08-25 ENCOUNTER — HOSPITAL ENCOUNTER (OUTPATIENT)
Dept: PHYSICAL THERAPY | Age: 65
Discharge: HOME | End: 2020-08-25
Payer: OTHER MISCELLANEOUS

## 2020-08-25 PROCEDURE — 97110 THERAPEUTIC EXERCISES: CPT

## 2020-08-25 NOTE — PROGRESS NOTES
PT DAILY TREATMENT NOTE    Patient Name: Edith Vanegas  Date:2020  : 1955  [x]  Patient  Verified  Payor: Arcola Habermann / Plan: [id number] / Product Type: Workers Comp /    In time:2:45  Out time:3:29  Total Treatment Time (min): 44  Total Timed Codes (min): 44  1:1 Treatment Time ( only): NA   Visit #: 3 of 12    Treatment Area: Right knee pain [M25.561]  Left hip pain [M25.552]    SUBJECTIVE  Pain Level (0-10 scale): 5/10  Any medication changes, allergies to medications, adverse drug reactions, diagnosis change, or new procedure performed?: [x] No    [] Yes (see summary sheet for update)  Subjective functional status/changes:   [] No changes reported  \"I have some pain right now. \"    OBJECTIVE        44 min Therapeutic Exercise:  [x] See flow sheet :   Rationale: increase ROM, increase strength and improve coordination to improve the patients ability to return to prior level of physical activity. With   [] TE   [] TA   [] neuro   [] other: Patient Education: [x] Review HEP    [] Progressed/Changed HEP based on:   [] positioning   [] body mechanics   [] transfers   [] heat/ice application    [] other:      Other Objective/Functional Measures:      Pain Level (0-10 scale) post treatment: 0/10    ASSESSMENT/Changes in Function: Progressed pt with general strengthening ex but within tolerance to promote knee stability and function. Pt terminated tx early due to person in car, waiting. Patient will continue to benefit from skilled PT services to modify and progress therapeutic interventions, address functional mobility deficits, address ROM deficits, address strength deficits, analyze and address soft tissue restrictions, analyze and cue movement patterns, analyze and modify body mechanics/ergonomics and assess and modify postural abnormalities to attain remaining goals.      []  See Plan of Care  []  See progress note/recertification  []  See Discharge Summary         Progress towards goals / Updated goals:  Short Term Goals: To be accomplished in 2 weeks:  1. Patient will be independent and compliant with HEP to progress toward goals and restore functional mobility. Eval Status: issued at [facility]  Current: reports she is doing \"some of the stretches\" 8/21/20     2. Patient will improve hip FOTO score by 5 points to improve functional tolerance for completing work duties without increased pain. Eval Status: FOTO 52     3. Patient will improve pain in left hip and right knee to 5/10 at worst  to improve ambulation tolerance and restore prior level of function. Eval Status: 8/10 at worst     4. Pt will have right knee ROM WFL to aid in functional mechanics for ambulation/ADLs. Eval Status:   Knee Left Right   Ext 0 -2   Flex 125 113         Long Term Goals: To be accomplished in 6 weeks:  1. Patient will improve knee FOTO score by 6 points to improve functional tolerance for walking for exercise. Eval Status: FOTO 47     2. Patient will have 5/5 bilateral LE strength to return to goals of walking for exercise and completing work duties without increased pain. Eval Status:   Hip L (1-5) R (1-5)   Hip Flexion 4 4   Hip Ext 4- 4-   Hip ABD 4- 4-   Hip ADD 4- 4-   Hip ER 4- 4-   Hip IR 4- 4-      Knee L (1-5) R (1-5)   Knee Flexion 4 4- P! Knee Extension 4 4- P! Ankle PF 4 4   Ankle DF 4 4   Other          3. Patient will improve pain in left hip and right knee to 1-2/10 at worst to improve ambulation tolerance and restore prior level of function.   Eval Status: 8/10 at worst          PLAN  [x]  Upgrade activities as tolerated     [x]  Continue plan of care  []  Update interventions per flow sheet       []  Discharge due to:_  []  Other:_      Louise Roberto, LISA 8/25/2020  3:34 PM    Future Appointments   Date Time Provider Angel Lopez   8/27/2020  2:15 PM [facility]   9/1/2020  2:45 PM [facility]   9/3/2020 2:45 PM [facility]   9/8/2020  2:45 PM [facility]   9/10/2020  2:45 PM Joelle Encarnacion PT [facility] THE [facility]

## 2020-08-26 ENCOUNTER — APPOINTMENT (OUTPATIENT)
Dept: PHYSICAL THERAPY | Age: 65
End: 2020-08-26
Payer: COMMERCIAL

## 2020-09-01 ENCOUNTER — APPOINTMENT (OUTPATIENT)
Dept: PHYSICAL THERAPY | Age: 65
End: 2020-09-01
Payer: OTHER MISCELLANEOUS

## 2020-09-02 ENCOUNTER — APPOINTMENT (OUTPATIENT)
Dept: PHYSICAL THERAPY | Age: 65
End: 2020-09-02
Payer: OTHER MISCELLANEOUS

## 2020-09-03 ENCOUNTER — APPOINTMENT (OUTPATIENT)
Dept: PHYSICAL THERAPY | Age: 65
End: 2020-09-03
Payer: OTHER MISCELLANEOUS

## 2020-09-08 ENCOUNTER — HOSPITAL ENCOUNTER (OUTPATIENT)
Dept: PHYSICAL THERAPY | Age: 65
Discharge: HOME | End: 2020-09-08
Payer: OTHER MISCELLANEOUS

## 2020-09-08 PROCEDURE — 97110 THERAPEUTIC EXERCISES: CPT

## 2020-09-08 NOTE — PROGRESS NOTES
PT DAILY TREATMENT NOTE    Patient Name: Fili Post  Date:2020  : 1955  [x]  Patient  Verified  Payor: Aria Riser / Plan: [id number] [facility] / Product Type: Workers Comp /    In [facility] time:3:32  Total Treatment Time (min): 42  Total Timed Codes (min): 42  1:1 Treatment Time ( only): NA   Visit #: 4 of 12    Treatment Area: Right knee pain [M25.561]  Left hip pain [M25.552]    SUBJECTIVE  Pain Level (0-10 scale): 4/10  Any medication changes, allergies to medications, adverse drug reactions, diagnosis change, or new procedure performed?: [x] No    [] Yes (see summary sheet for update)  Subjective functional status/changes:   [] No changes reported  \"It hurts some today. \"    OBJECTIVE    42 min Therapeutic Exercise:  [x] See flow sheet :   Rationale: increase ROM, increase strength and improve coordination to improve the patients ability to return to prior level of physical activity. With   [] TE   [] TA   [] neuro   [] other: Patient Education: [x] Review HEP    [] Progressed/Changed HEP based on:   [] positioning   [] body mechanics   [] transfers   [] heat/ice application    [] other:      Other Objective/Functional Measures:      Pain Level (0-10 scale) post treatment: 5/10    ASSESSMENT/Changes in Function: Pt reported increased pain in knee with standing HS curls but was able to complete without stopping. Pt reported slight increased pain but not above tolerance. Patient will continue to benefit from skilled PT services to modify and progress therapeutic interventions, address functional mobility deficits, address ROM deficits, address strength deficits, analyze and address soft tissue restrictions, analyze and cue movement patterns, analyze and modify body mechanics/ergonomics and assess and modify postural abnormalities to attain remaining goals.      []  See Plan of Care  []  See progress note/recertification  []  See Discharge Summary Progress towards goals / Updated goals:  Short Term Goals: To be accomplished in 2 weeks:  1. Patient will be compliant with HEP to progress toward goals and restore functional mobility. Eval Status: Issued at Evaluation  Current: Pt reports attempted compliance 9/8/20     2. Patient will improve FOTO score by 5  points to improve functional tolerance for exercise. Eval Status:50     3. Patient will improve pain in right UE  to 4/10  to improve reaching overhead stolerance and restore prior level of function.   Eval Status: 8/10     4. Pt will have 3/5 right UEstrength to return to goals of reaching overhead  Eval Status:  Strength (MMT):    L (1-5) R (1-5)   Shoulder flexion 5/5 2/5   Shoulder abduction 5/5 2/5   Shoulder ER 5/5 2/5   Shoulder IR 5/5 2/5                                            5. Pt will have right shoulder AROM by 10 degrees for all deficient ranges to aid in functional mechanics for ambulation/ADLs. Eval StatuROM:                                      AROM                                               Shoulder Left Right   Flex 150 145   Ext 30 neutral    70   ER 70 20   IR 65 50            Long Term Goals: To be accomplished in 4 weeks:  2. Patient will be independent with HEP to progress toward goals of independent and complaint. Eval Status:Issued at evaluaiton     3. Patient will improve FOTO score by 15 points to improve functional tolerance for lifting light objects overhead. Eval Status: 50     4. [facility] will have right shoulder AROM by 20 degrees for all deficient ranges  to aid in functional mechanics for ambulation/ADLs. Eval Status:  ROM:                               AROM                                               Shoulder Left Right   Flex 150 145   Ext 30 neutral    70   ER 70 20   IR 65 50         5. 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   Eval Status:  Strength (MMT):    L (1-5) R (1-5) Shoulder flexion 5/5 2/5   Shoulder abduction 5/5 2/5   Shoulder ER 5/5 2/5   Shoulder IR 5/5 2/5         6. Patient will improve pain in right shoulder to 2/10 to improve reaching behind back with right UE tolerance and restore prior level of function.   Eval Status: 8/10                     PLAN  [x]  Upgrade activities as tolerated     [x]  Continue plan of care  []  Update interventions per flow sheet       []  Discharge due to:_  []  Other:_      Samson Hernandez PTA 9/8/2020  3:29 PM    Future Appointments   Date Time Provider Angel Lopez   9/10/2020  2:45 PM [facility]

## 2020-09-09 ENCOUNTER — APPOINTMENT (OUTPATIENT)
Dept: PHYSICAL THERAPY | Age: 65
End: 2020-09-09
Payer: OTHER MISCELLANEOUS

## 2020-09-10 ENCOUNTER — HOSPITAL ENCOUNTER (OUTPATIENT)
Dept: PHYSICAL THERAPY | Age: 65
Discharge: HOME | End: 2020-09-10
Payer: OTHER MISCELLANEOUS

## 2020-09-10 PROCEDURE — 97110 THERAPEUTIC EXERCISES: CPT

## 2020-09-10 NOTE — PROGRESS NOTES
PT DAILY TREATMENT NOTE    Patient Name: Ciro De Los Santos  Date:9/10/2020  : 1955  [x]  Patient  Verified  Payor: Maritza Wyatt / Plan: [id number] / Product Type: Workers Comp /    In time:2:49  Out time:3:30  Total Treatment Time (min): 41  Total Timed Codes (min): 41  1:1 Treatment Time (MC only): NA   Visit #: 5 of 12    Treatment Area: Right knee pain [M25.561]  Left hip pain [M25.552]    SUBJECTIVE  Pain Level (0-10 scale): 4/10  Any medication changes, allergies to medications, adverse drug reactions, diagnosis change, or new procedure performed?: [x] No    [] Yes (see summary sheet for update)  Subjective functional status/changes:   [] No changes reported  \"I don't have a terrible amount of pain right now just some.'    OBJECTIVE      41 min Therapeutic Exercise:  [x] See flow sheet :   Rationale: increase ROM, increase strength and improve coordination to improve the patients ability to return to prior level of physical activity. With   [] TE   [] TA   [] neuro   [] other: Patient Education: [x] Review HEP    [] Progressed/Changed HEP based on:   [] positioning   [] body mechanics   [] transfers   [] heat/ice application    [] other:      Other Objective/Functional Measures:      Pain Level (0-10 scale) post treatment: 0/10    ASSESSMENT/Changes in Function: Pt tolerated session well with no increased pain. Pt did report  Increased fatigue with ex. Pt denied modalities post tx. Pt will perform reasess next visit for future plan of care. Patient will continue to benefit from skilled PT services to modify and progress therapeutic interventions, address functional mobility deficits, address ROM deficits, address strength deficits, analyze and address soft tissue restrictions, analyze and cue movement patterns, analyze and modify body mechanics/ergonomics and assess and modify postural abnormalities to attain remaining goals.      []  See Plan of Care  []  See progress note/recertification  []  See Discharge Summary         Progress towards goals / Updated goals:  Short Term Goals: To be accomplished in 2 weeks:  1. Patient will be independent and compliant with HEP to progress toward goals and restore functional mobility. Eval Status: issued at Eval  Current: reports she is doing \"some of the stretches\" 8/21/20     2. Patient will improve hip FOTO score by 5 points to improve functional tolerance for completing work duties without increased pain. Eval Status: FOTO 52     3. Patient will improve pain in left hip and right knee to 5/10 at worst  to improve ambulation tolerance and restore prior level of function. Eval Status: 8/10 at worst  Current: Pt reports 7/10 at worst in past week 9/10/20     4. Pt will have right knee ROM WFL to aid in functional mechanics for ambulation/ADLs. Eval Status:   Knee Left Right   Ext 0 -2   Flex 125 113         Long Term Goals: To be accomplished in 6 weeks:  1. Patient will improve knee FOTO score by 6 points to improve functional tolerance for walking for exercise. Eval Status: FOTO 47     2. Patient will have 5/5 bilateral LE strength to return to goals of walking for exercise and completing work duties without increased pain. Eval Status:   Hip L (1-5) R (1-5)   Hip Flexion 4 4   Hip Ext 4- 4-   Hip ABD 4- 4-   Hip ADD 4- 4-   Hip ER 4- 4-   Hip IR 4- 4-      Knee L (1-5) R (1-5)   Knee Flexion 4 4- P! Knee Extension 4 4- P! Ankle PF 4 4   Ankle DF 4 4   Other          3. Patient will improve pain in left hip and right knee to 1-2/10 at worst to improve ambulation tolerance and restore prior level of function. Eval Status: 8/10 at worst                      PLAN  [x]  Upgrade activities as tolerated     [x]  Continue plan of care  []  Update interventions per flow sheet       []  Discharge due to:_  []  Other:_      Case Skinner, LISA 9/10/2020  2:58 PM    No future appointments.

## 2020-09-16 ENCOUNTER — APPOINTMENT (OUTPATIENT)
Dept: PHYSICAL THERAPY | Age: 65
End: 2020-09-16
Payer: OTHER MISCELLANEOUS

## 2020-09-17 ENCOUNTER — HOSPITAL ENCOUNTER (OUTPATIENT)
Dept: MRI IMAGING | Age: 65
Discharge: HOME | End: 2020-09-17
Attending: ORTHOPAEDIC SURGERY
Payer: COMMERCIAL

## 2020-09-17 DIAGNOSIS — M25.511 RIGHT SHOULDER PAIN: ICD-10-CM

## 2020-09-17 PROCEDURE — 73221 MRI JOINT UPR EXTREM W/O DYE: CPT

## 2020-09-21 ENCOUNTER — APPOINTMENT (OUTPATIENT)
Dept: PHYSICAL THERAPY | Age: 65
End: 2020-09-21
Payer: OTHER MISCELLANEOUS

## 2020-09-23 ENCOUNTER — HOSPITAL ENCOUNTER (OUTPATIENT)
Dept: PHYSICAL THERAPY | Age: 65
Discharge: HOME | End: 2020-09-23
Payer: OTHER MISCELLANEOUS

## 2020-09-23 PROCEDURE — 97110 THERAPEUTIC EXERCISES: CPT

## 2020-09-23 NOTE — PROGRESS NOTES
PT DAILY TREATMENT NOTE    Patient Name: Kolby Graham  Date:2020  : 1955  [x]  Patient  Verified  Payor: Jadiel Sun / Plan: [address] / Product Type: Workers Comp /    In time:2:47  Out time: 3:30  Total Treatment Time (min): 43  Total Timed Codes (min): 28  1:1 Treatment Time ( only): NA   Visit #: 6 of 12    Treatment Area: Right knee pain [M25.561]  Left hip pain [M25.552]    SUBJECTIVE  Pain Level (0-10 scale): 10/10  Any medication changes, allergies to medications, adverse drug reactions, diagnosis change, or new procedure performed?: [x] No    [] Yes (see summary sheet for update)  Subjective functional status/changes:   [] No changes reported  \"I woke up this morning with like a pulled muscle in the back of my leg. \"    OBJECTIVE    Modalities Rationale:     decrease edema, decrease inflammation and decrease pain to improve patient's ability to return to prior level of physical activity. 15 min []  Ice     [x]  Heat    location/position: Left Posterior Hip/Supine   [x] Skin assessment post-treatment (if applicable):    [x]  intact    []  redness- no adverse reaction     []redness - adverse reaction:          28 min Therapeutic Exercise:  [x] See flow sheet :   Rationale: increase ROM, increase strength and improve coordination to improve the patients ability to return to prior level of physical activity. With   [] TE   [] TA   [] neuro   [] other: Patient Education: [x] Review HEP    [] Progressed/Changed HEP based on:   [] positioning   [] body mechanics   [] transfers   [] heat/ice application    [] othe     Other Objective/Functional Measures: FOTO: Hip: 53, Knee: 57     Pain Level (0-10 scale) post treatment: 1/10    ASSESSMENT/Changes in Function: Pt arrived reporting high pain levels in posterior hip. Pt reported \"pulled muscle feeling\". Pt agreed to reduced difficulty treatment focusing stretches.  Pt performed all stretches with reports of pain during performance. Pt received MHP post tx and reported greatly decreased pain. Patient will continue to benefit from skilled PT services to modify and progress therapeutic interventions, address functional mobility deficits, address ROM deficits, address strength deficits, analyze and address soft tissue restrictions, analyze and cue movement patterns, analyze and modify body mechanics/ergonomics and assess and modify postural abnormalities to attain remaining goals. []  See Plan of Care  []  See progress note/recertification  []  See Discharge Summary         Progress towards goals / Updated goals:  Short Term Goals: To be accomplished in 2 weeks:  1. Patient will be independent and compliant with HEP to progress toward goals and restore functional mobility. Eval Status: issued at Eval  Current: reports she is doing \"some of the stretches\" 8/21/20     2. Patient will improve hip FOTO score by 5 points to improve functional tolerance for completing work duties without increased pain. Eval Status: FOTO 52  Current: FOTO: 53 9/23/20     3. Patient will improve pain in left hip and right knee to 5/10 at worst  to improve ambulation tolerance and restore prior level of function. Eval Status: 8/10 at worst  Current: Pt reports 7/10 at worst in past week 9/10/20     4. Pt will have right knee ROM WFL to aid in functional mechanics for ambulation/ADLs. Eval Status:   Knee Left Right   Ext 0 -2   Flex 125 113         Long Term Goals: To be accomplished in 6 weeks:  1. Patient will improve knee FOTO score by 6 points to improve functional tolerance for walking for exercise. Eval Status: FOTO 47  Current: FOTO: 57 9/23/20 MET     2. Patient will have 5/5 bilateral LE strength to return to goals of walking for exercise and completing work duties without increased pain.   Eval Status:   Hip L (1-5) R (1-5)   Hip Flexion 4 4   Hip Ext 4- 4-   Hip ABD 4- 4-   Hip ADD 4- 4-   Hip ER 4- 4-   Hip IR 4- 4-      Knee L (1-5) R (1-5)   Knee Flexion 4 4- P! Knee Extension 4 4- P! Ankle PF 4 4   Ankle DF 4 4   Other          3. Patient will improve pain in left hip and right knee to 1-2/10 at worst to improve ambulation tolerance and restore prior level of function.   Eval Status: 8/10 at worst                      PLAN  [x]  Upgrade activities as tolerated     [x]  Continue plan of care  []  Update interventions per flow sheet       []  Discharge due to:_  []  Other:_      Juliet Sesay, LISA 9/23/2020  3:06 PM    Future Appointments   Date Time Provider Angel Lopez   9/29/2020  2:45 PM [facility]   9/30/2020  2:45 PM [facility]   10/5/2020  2:45 PM Danyelle Hein [facility]

## 2020-09-28 ENCOUNTER — APPOINTMENT (OUTPATIENT)
Dept: PHYSICAL THERAPY | Age: 65
End: 2020-09-28
Payer: OTHER MISCELLANEOUS

## 2020-09-29 ENCOUNTER — APPOINTMENT (OUTPATIENT)
Dept: PHYSICAL THERAPY | Age: 65
End: 2020-09-29
Payer: OTHER MISCELLANEOUS

## 2020-09-30 ENCOUNTER — HOSPITAL ENCOUNTER (OUTPATIENT)
Dept: PHYSICAL THERAPY | Age: 65
Discharge: HOME | End: 2020-09-30
Payer: OTHER MISCELLANEOUS

## 2020-09-30 PROCEDURE — 97112 NEUROMUSCULAR REEDUCATION: CPT

## 2020-09-30 PROCEDURE — 97110 THERAPEUTIC EXERCISES: CPT

## 2020-09-30 PROCEDURE — 97530 THERAPEUTIC ACTIVITIES: CPT

## 2020-09-30 NOTE — PROGRESS NOTES
[facility]  [address]  Ph [phone]  Fx [phone]    Physical Therapy Progress Note  Patient name: Prema Soriano Start of Care: 2020   Referral source: Unruly Moon MD : 1955                Medical Diagnosis: Right knee pain [M25.561]  Left hip pain [M25.552]    Onset Date:                 Treatment Diagnosis: right knee pain M25.561, left hip pain M25.552                                     Prior Hospitalization: see medical history Provider#: [id number]   Medications: Verified on Patient summary List    Comorbidities: arthritis, tobacco use, right TKA , wears glasses   Prior Level of Function: functionally independent, no AD, active lifestyle      Visits from Start of Care: 7    Missed Visits: 5    Goals/Measure of Progress:    Pt has made improvements overall with general strength and ROM at this time. Pt reports overall decreased pain as well. Pt would benefit from continued therapy to address mild deficits in hip strength and ROM. Pt has improved with general HEP performance but would benefit from maintaining compliance more constantly. Short Term Goals: To be accomplished in 2 weeks:  1. Patient will be independent and compliant with HEP to progress toward goals and restore functional mobility. Eval Status: issued at Eval  Current: Pt reports compliance of 1x/day in past several days 20     2. Patient will improve hip FOTO score by 5 points to improve functional tolerance for completing work duties without increased pain. Eval Status: FOTO 52  Current: FOTO: 53 20     3. Patient will improve pain in left hip and right knee to 5/10 at worst  to improve ambulation tolerance and restore prior level of function. Eval Status: 8/10 at worst  Current: Pt reports 3-4/10 at worst at work 20     4. Pt will have right knee ROM WFL to aid in functional mechanics for ambulation/ADLs.   Eval Status:   Knee Left Right Ext 0 -2   Flex 125 113    Current: 2-118 9/30/20      Long Term Goals: To be accomplished in 6 weeks:  1. Patient will improve knee FOTO score by 6 points to improve functional tolerance for walking for exercise. Eval Status: FOTO 47  Current: FOTO: 57 9/23/20 MET     2. Patient will have 5/5 bilateral LE strength to return to goals of walking for exercise and completing work duties without increased pain. Eval Status:   Hip L (1-5) R (1-5) Left/Right9/30/20    Hip Flexion 4 4 4/4   Hip Ext 4- 4- 4-/4-   Hip ABD 4- 4- 4/4   Hip ADD 4- 4-     Hip ER 4- 4- 4/4   Hip IR 4- 4- 4/4      Knee L (1-5) R (1-5) Left/Right 9/30/20   Knee Flexion 4 4- P! 5/5   Knee Extension 4 4- P! 5/5   Ankle PF 4 4 5/5   Ankle DF 4 4 5/5   Other          Current: see above 9/30/20     3. Patient will improve pain in left hip and right knee to 1-2/10 at worst to improve ambulation tolerance and restore prior level of function. Eval Status: 8/10 at worst   Current: Pt reports 3-4/10 at highest during work 9/30/20         Key Functional Changes: improved strength, improved pain  Updated Goals:  To be achieved in 5 visits:      ASSESSMENT/RECOMMENDATIONS:  [x]Continue therapy per initial plan/protocol at a frequency of  2 x per week for 5 visits  []Continue therapy with the following recommended changes:_____________________ _____________________________ ________________________________________  []Discontinue therapy progressing towards or have reached established goals  []Discontinue therapy due to lack of appreciable progress towards goals  []Discontinue therapy due to lack of attendance or compliance  []Await Physician's recommendations/decisions regarding therapy  []Other:________________________________________________________________    Thank you for this referral.   Tressa Gregg PT 9/30/2020 5:43 PM

## 2020-09-30 NOTE — PROGRESS NOTES
PT DAILY TREATMENT NOTE    Patient Name: Kathy Thompson  Date:2020  : 1955  [x]  Patient  Verified  Payor: Theodosia Eisenmenger / Plan: [id number] [facility] / Product Type: Workers Comp /    In [place] time:3:38  Total Treatment Time (min): 50  Total Timed Codes (min): 50  1:1 Treatment Time (MC only): NA   Visit #: 7 of 12    Treatment Area: Right knee pain [M25.561]  Left hip pain [M25.552]    SUBJECTIVE  Pain Level (0-10 scale): 1-2/10  Any medication changes, allergies to medications, adverse drug reactions, diagnosis change, or new procedure performed?: [x] No    [] Yes (see summary sheet for update)  Subjective functional status/changes:   [] No changes reported  \"I don't have any pain really, just a little. My shoulder hurts really from the tear in it. \"    OBJECTIVE      26 min Therapeutic Exercise:  [x] See flow sheet :   Rationale: increase ROM, increase strength and improve coordination to improve the patients ability to return to prior level of physical activity. 14 min Therapeutic Activity:  [x]  See flow sheet :   Rationale: increase ROM, increase strength and improve coordination  to improve the patients ability to return to prior level of physical activity. 10 min Neuromuscular Re-education:  [x]  See flow sheet :   Rationale: increase ROM, increase strength and improve coordination  to improve the patients ability to return to prior level of physical activity. With   [] TE   [] TA   [] neuro   [] other: Patient Education: [x] Review HEP    [] Progressed/Changed HEP based on:   [] positioning   [] body mechanics   [] transfers   [] heat/ice application    [] other:      Other Objective/Functional Measures:      Pain Level (0-10 scale) post treatment: 0/10    ASSESSMENT/Changes in Function: Pt overall has made improvements with general strength and ROM at this time. Pt reports overall decreased pain as well.  Pt would benefit from continued therapy to address mild deficits in hip strength and ROM. Pt has improved with general HEP performance but would benefit from maintaining compliance more constantly. Patient will continue to benefit from skilled PT services to modify and progress therapeutic interventions, address functional mobility deficits, address ROM deficits, address strength deficits, analyze and address soft tissue restrictions, analyze and cue movement patterns, analyze and modify body mechanics/ergonomics and assess and modify postural abnormalities to attain remaining goals. []  See Plan of Care  []  See progress note/recertification  []  See Discharge Summary         Progress towards goals / Updated goals:  Short Term Goals: To be accomplished in 2 weeks:  1. Patient will be independent and compliant with HEP to progress toward goals and restore functional mobility. Eval Status: issued at Eval  Current: Pt reports compliance of 1x/day in past several days 9/30/20     2. Patient will improve hip FOTO score by 5 points to improve functional tolerance for completing work duties without increased pain. Eval Status: FOTO 52  Current: FOTO: 53 9/23/20     3. Patient will improve pain in left hip and right knee to 5/10 at worst  to improve ambulation tolerance and restore prior level of function. Eval Status: 8/10 at worst  Current: Pt reports 3-4/10 at worst at work 9/30/20     4. Pt will have right knee ROM WFL to aid in functional mechanics for ambulation/ADLs. Eval Status:   Knee Left Right   Ext 0 -2   Flex 125 113    Current: 2-118 9/30/20      Long Term Goals: To be accomplished in 6 weeks:  1. Patient will improve knee FOTO score by 6 points to improve functional tolerance for walking for exercise. Eval Status: FOTO 47  Current: FOTO: 57 9/23/20 MET     2. Patient will have 5/5 bilateral LE strength to return to goals of walking for exercise and completing work duties without increased pain.   Eval Status:   Hip L (1-5) R (1-5) Left/Right9/30/20    Hip Flexion 4 4 4/4   Hip Ext 4- 4- 4-/4-   Hip ABD 4- 4- 4/4   Hip ADD 4- 4-    Hip ER 4- 4- 4/4   Hip IR 4- 4- 4/4      Knee L (1-5) R (1-5) Left/Right 9/30/20   Knee Flexion 4 4- P! 5/5   Knee Extension 4 4- P! 5/5   Ankle PF 4 4 5/5   Ankle DF 4 4 5/5   Other         Current: see above 9/30/20    3. Patient will improve pain in left hip and right knee to 1-2/10 at worst to improve ambulation tolerance and restore prior level of function.   Eval Status: 8/10 at worst   Current: Pt reports 3-4/10 at highest during work 9/30/20      PLAN  [x]  Upgrade activities as tolerated     [x]  Continue plan of care  []  Update interventions per flow sheet       []  Discharge due to:_  []  Other:_      Marly [place], LISA 9/30/2020  3:08 PM    Future Appointments   Date Time Provider Angel Lopez   10/5/2020  2:45 PM [facility]

## 2020-10-02 NOTE — PROGRESS NOTES
[facility]  [address], [address]  Ph [phone]  Fx [phone]    Physical Therapy Discharge Summary    Patient name: Stephania Cushing Start of Care: 2020   Referral source: Kaykay Carpenter MD : 1955   Medical/Treatment Diagnosis: Right shoulder pain [M25.511] Onset Date:2020     Prior Hospitalization: see medical history Provider#: [id number]   Medications: Verified on Patient Summary List    Comorbidities: right TKR  Prior Level of Function:independent with ADLs    Visits from Start of Care: 1    Missed Visits: 0      Summary of Care:    Patient has been discharged from skilled physical therapy due to abdication. Patient did not return for any follow up visits after 2020. No goals met.     ASSESSMENT/RECOMMENDATIONS:  [x]Discontinue therapy: []Patient has reached or is progressing toward set goals      [x]Patient is non-compliant or has abdicated      []Due to lack of appreciable progress towards set goals    Nancy Suero 10/2/2020 1:15 PM

## 2020-10-05 ENCOUNTER — APPOINTMENT (OUTPATIENT)
Dept: PHYSICAL THERAPY | Age: 65
End: 2020-10-05

## 2020-10-21 ENCOUNTER — TRANSCRIBE ORDER (OUTPATIENT)
Dept: REGISTRATION | Age: 65
End: 2020-10-21

## 2020-10-21 ENCOUNTER — HOSPITAL ENCOUNTER (OUTPATIENT)
Dept: PREADMISSION TESTING | Age: 65
Discharge: HOME | End: 2020-10-21
Payer: COMMERCIAL

## 2020-10-21 DIAGNOSIS — M19.011 ARTHRITIS OF RIGHT SHOULDER REGION: ICD-10-CM

## 2020-10-21 DIAGNOSIS — M25.511 RIGHT SHOULDER PAIN: ICD-10-CM

## 2020-10-21 DIAGNOSIS — M75.41 IMPINGEMENT SYNDROME OF RIGHT SHOULDER: ICD-10-CM

## 2020-10-21 DIAGNOSIS — M19.011 ARTHRITIS OF RIGHT SHOULDER REGION: Primary | ICD-10-CM

## 2020-10-21 LAB
ALBUMIN SERPL-MCNC: 3.6 G/DL (ref 3.4–5)
ALBUMIN/GLOB SERPL: 1 {RATIO} (ref 0.8–1.7)
ALP SERPL-CCNC: 97 U/L (ref 45–117)
ALT SERPL-CCNC: 23 U/L (ref 13–56)
ANION GAP SERPL CALC-SCNC: 3 MMOL/L (ref 3–18)
AST SERPL-CCNC: 15 U/L (ref 10–38)
BASOPHILS # BLD: 0 K/UL (ref 0–0.1)
BASOPHILS NFR BLD: 0 % (ref 0–2)
BILIRUB SERPL-MCNC: 0.4 MG/DL (ref 0.2–1)
BUN SERPL-MCNC: 16 MG/DL (ref 7–18)
BUN/CREAT SERPL: 16 (ref 12–20)
CALCIUM SERPL-MCNC: 10.2 MG/DL (ref 8.5–10.1)
CHLORIDE SERPL-SCNC: 107 MMOL/L (ref 100–111)
CO2 SERPL-SCNC: 32 MMOL/L (ref 21–32)
CREAT SERPL-MCNC: 0.98 MG/DL (ref 0.6–1.3)
DIFFERENTIAL METHOD BLD: ABNORMAL
EOSINOPHIL # BLD: 0.1 K/UL (ref 0–0.4)
EOSINOPHIL NFR BLD: 2 % (ref 0–5)
ERYTHROCYTE [DISTWIDTH] IN BLOOD BY AUTOMATED COUNT: 13.5 % (ref 11.6–14.5)
GLOBULIN SER CALC-MCNC: 3.7 G/DL (ref 2–4)
GLUCOSE SERPL-MCNC: 98 MG/DL (ref 74–99)
HCT VFR BLD AUTO: 41.6 % (ref 35–45)
HGB BLD-MCNC: 13.5 G/DL (ref 12–16)
LYMPHOCYTES # BLD: 2.6 K/UL (ref 0.9–3.6)
LYMPHOCYTES NFR BLD: 51 % (ref 21–52)
MCH RBC QN AUTO: 28.8 PG (ref 24–34)
MCHC RBC AUTO-ENTMCNC: 32.5 G/DL (ref 31–37)
MCV RBC AUTO: 88.7 FL (ref 74–97)
MONOCYTES # BLD: 0.4 K/UL (ref 0.05–1.2)
MONOCYTES NFR BLD: 8 % (ref 3–10)
NEUTS SEG # BLD: 2 K/UL (ref 1.8–8)
NEUTS SEG NFR BLD: 39 % (ref 40–73)
PLATELET # BLD AUTO: 321 K/UL (ref 135–420)
PMV BLD AUTO: 10.1 FL (ref 9.2–11.8)
POTASSIUM SERPL-SCNC: 4.7 MMOL/L (ref 3.5–5.5)
PROT SERPL-MCNC: 7.3 G/DL (ref 6.4–8.2)
RBC # BLD AUTO: 4.69 M/UL (ref 4.2–5.3)
SODIUM SERPL-SCNC: 142 MMOL/L (ref 136–145)
WBC # BLD AUTO: 5.2 K/UL (ref 4.6–13.2)

## 2020-10-21 PROCEDURE — 85025 COMPLETE CBC W/AUTO DIFF WBC: CPT

## 2020-10-21 PROCEDURE — 36415 COLL VENOUS BLD VENIPUNCTURE: CPT

## 2020-10-21 PROCEDURE — 93005 ELECTROCARDIOGRAM TRACING: CPT

## 2020-10-21 PROCEDURE — 80053 COMPREHEN METABOLIC PANEL: CPT

## 2020-10-22 LAB
ATRIAL RATE: 66 BPM
CALCULATED P AXIS, ECG09: 66 DEGREES
CALCULATED R AXIS, ECG10: -3 DEGREES
CALCULATED T AXIS, ECG11: -5 DEGREES
DIAGNOSIS, 93000: NORMAL
P-R INTERVAL, ECG05: 162 MS
Q-T INTERVAL, ECG07: 408 MS
QRS DURATION, ECG06: 84 MS
QTC CALCULATION (BEZET), ECG08: 427 MS
VENTRICULAR RATE, ECG03: 66 BPM

## 2020-10-29 ENCOUNTER — ANESTHESIA EVENT (OUTPATIENT)
Dept: SURGERY | Age: 65
End: 2020-10-29
Payer: COMMERCIAL

## 2020-10-30 ENCOUNTER — HOSPITAL ENCOUNTER (OUTPATIENT)
Dept: LAB | Age: 65
Discharge: HOME | End: 2020-10-30
Payer: COMMERCIAL

## 2020-10-30 LAB
ALBUMIN SERPL-MCNC: 3.7 G/DL (ref 3.4–5)
ALBUMIN/GLOB SERPL: 1 {RATIO} (ref 0.8–1.7)
ALP SERPL-CCNC: 91 U/L (ref 45–117)
ALT SERPL-CCNC: 25 U/L (ref 13–56)
ANION GAP SERPL CALC-SCNC: 5 MMOL/L (ref 3–18)
AST SERPL-CCNC: 17 U/L (ref 10–38)
BASOPHILS # BLD: 0 K/UL (ref 0–0.1)
BASOPHILS NFR BLD: 0 % (ref 0–2)
BILIRUB SERPL-MCNC: 0.5 MG/DL (ref 0.2–1)
BUN SERPL-MCNC: 10 MG/DL (ref 7–18)
BUN/CREAT SERPL: 12 (ref 12–20)
CALCIUM SERPL-MCNC: 10.1 MG/DL (ref 8.5–10.1)
CHLORIDE SERPL-SCNC: 107 MMOL/L (ref 100–111)
CHOLEST SERPL-MCNC: 191 MG/DL
CO2 SERPL-SCNC: 31 MMOL/L (ref 21–32)
CREAT SERPL-MCNC: 0.84 MG/DL (ref 0.6–1.3)
CREAT UR-MCNC: 186 MG/DL (ref 30–125)
DIFFERENTIAL METHOD BLD: ABNORMAL
EOSINOPHIL # BLD: 0.1 K/UL (ref 0–0.4)
EOSINOPHIL NFR BLD: 2 % (ref 0–5)
ERYTHROCYTE [DISTWIDTH] IN BLOOD BY AUTOMATED COUNT: 13.7 % (ref 11.6–14.5)
EST. AVERAGE GLUCOSE BLD GHB EST-MCNC: 100 MG/DL
GLOBULIN SER CALC-MCNC: 3.7 G/DL (ref 2–4)
GLUCOSE SERPL-MCNC: 94 MG/DL (ref 74–99)
HBA1C MFR BLD: 5.1 % (ref 4.2–5.6)
HCT VFR BLD AUTO: 40.8 % (ref 35–45)
HDLC SERPL-MCNC: 72 MG/DL (ref 40–60)
HDLC SERPL: 2.7 {RATIO} (ref 0–5)
HGB BLD-MCNC: 13.3 G/DL (ref 12–16)
LDLC SERPL CALC-MCNC: 101.8 MG/DL (ref 0–100)
LIPID PROFILE,FLP: ABNORMAL
LYMPHOCYTES # BLD: 2.1 K/UL (ref 0.9–3.6)
LYMPHOCYTES NFR BLD: 55 % (ref 21–52)
MCH RBC QN AUTO: 29.2 PG (ref 24–34)
MCHC RBC AUTO-ENTMCNC: 32.6 G/DL (ref 31–37)
MCV RBC AUTO: 89.7 FL (ref 74–97)
MICROALBUMIN UR-MCNC: 1.69 MG/DL (ref 0–3)
MICROALBUMIN/CREAT UR-RTO: 9 MG/G (ref 0–30)
MONOCYTES # BLD: 0.4 K/UL (ref 0.05–1.2)
MONOCYTES NFR BLD: 10 % (ref 3–10)
NEUTS SEG # BLD: 1.3 K/UL (ref 1.8–8)
NEUTS SEG NFR BLD: 33 % (ref 40–73)
PLATELET # BLD AUTO: 340 K/UL (ref 135–420)
PMV BLD AUTO: 9.5 FL (ref 9.2–11.8)
POTASSIUM SERPL-SCNC: 4.4 MMOL/L (ref 3.5–5.5)
PROT SERPL-MCNC: 7.4 G/DL (ref 6.4–8.2)
RBC # BLD AUTO: 4.55 M/UL (ref 4.2–5.3)
SODIUM SERPL-SCNC: 143 MMOL/L (ref 136–145)
TRIGL SERPL-MCNC: 86 MG/DL (ref ?–150)
TSH SERPL DL<=0.05 MIU/L-ACNC: 1.23 UIU/ML (ref 0.36–3.74)
VLDLC SERPL CALC-MCNC: 17.2 MG/DL
WBC # BLD AUTO: 3.8 K/UL (ref 4.6–13.2)

## 2020-10-30 PROCEDURE — 80053 COMPREHEN METABOLIC PANEL: CPT

## 2020-10-30 PROCEDURE — 85025 COMPLETE CBC W/AUTO DIFF WBC: CPT

## 2020-10-30 PROCEDURE — 36415 COLL VENOUS BLD VENIPUNCTURE: CPT

## 2020-10-30 PROCEDURE — 83036 HEMOGLOBIN GLYCOSYLATED A1C: CPT

## 2020-10-30 PROCEDURE — 84443 ASSAY THYROID STIM HORMONE: CPT

## 2020-10-30 PROCEDURE — 82043 UR ALBUMIN QUANTITATIVE: CPT

## 2020-10-30 PROCEDURE — 80061 LIPID PANEL: CPT

## 2020-11-02 LAB
FAX TO INFO,FAXT: NORMAL
FAX TO NUMBER,FAXN: NORMAL

## 2020-11-03 ENCOUNTER — TRANSCRIBE ORDER (OUTPATIENT)
Dept: SCHEDULING | Age: 65
End: 2020-11-03

## 2020-11-03 DIAGNOSIS — R94.31 ABNORMAL ELECTROCARDIOGRAM: ICD-10-CM

## 2020-11-03 DIAGNOSIS — Z01.810 PRE-OPERATIVE CARDIOVASCULAR EXAMINATION: Primary | ICD-10-CM

## 2020-11-08 NOTE — H&P
Patient Name:   Dillon Her  Account #:  [de-identified]  YOB: 1955    Chief Complaint:  Right shoulder pain; follow up MRI. History of Chief Complaint:  She had the MRI done for her right shoulder. This study is reviewed. This shows high-grade partial tearing and potentially full-thickness tearing of the supraspinatus with severe [facility] joint, and mild glenohumeral DJD and signs of bursitis. Past Medical/Surgical History:    Disease/Disorder Date Side Surgery Date Side Comment   Arthritis         Nerve disorder            Carpal tunnel release 07/2016 left       Carpal tunnel release 12/27/2018 right       Hysterectomy 2001        Knee replacement 01/08/2017        Lipoma and ganglion cyst excision 12/27/2018 right    Allergies:  No known allergies. Ingredient Reaction Medication Name Comment   NO KNOWN ALLERGIES        Current Medications:    Medication Directions   Aleve 220 mg tablet    diclofenac sodium 75 mg tablet,delayed release    Naprosyn 500 mg tablet    Percocet 5 mg-325 mg tablet take 1 tablet by oral route every 4 hours as needed   Social History:    SMOKING  Status Tobacco Type Units Per Day Yrs Used   Current every day smoker Cigarette     ALCOHOL  There is a history of alcohol use. Type: Wine. 5 glasses consumed weekly. Family History:    Disease Detail Family Member Age Cause of Death Comments   Hypertension Father  N    Arthritis Father  N    Arthritis Mother  N    Cancer, pancreas Mother  N    Diabetes Father  N    Rheumatoid arthritis Mother  N    Rheumatoid arthritis Father  N    Stroke Father  N    Review of Systems:    Pertinent positives include joint pain and joint stiffness. Pertinent negatives include fever and fainting.     Vitals:  Date BP Pulse Temp (F) Resp. (per min.) Height (Total in.) Weight (lbs.) BMI   09/03/2020     63.00  38.44   09/03/2020     63.00  37.38   01/04/2019     63.00  37.38   10/16/2018     63.00  38.44   Physical Examination:  She has good motion in the cervical spine. She has some slight neck pain with extremes of motion, but her neurovascular exam is normal.      The right shoulder shows generally good motion. She has pain particularly with thumb-down abduction, but she has much more severe pain with both a Speed's and [place]'s maneuver. She has pain with resisted abduction and she has weakness in external rotation. Impression:    1. Right shoulder impingement, degenerative joint disease of the acromioclavicular joint, partial cuff tearing. 2.  Cervical spondylosis and possible cervical radiculitis. Plan:  She will be scheduled for a right shoulder arthroscopic decompression, resection of distal clavicle, and rotator cuff repair or debridement. She will plan to see Dr. Karine Martini to go over her cervical exam to be sure that the is not a significant contributing factor. She is given a sling. Postop, she will use Percocet.

## 2020-11-09 ENCOUNTER — HOSPITAL ENCOUNTER (OUTPATIENT)
Dept: PREADMISSION TESTING | Age: 65
Discharge: HOME | End: 2020-11-09
Payer: COMMERCIAL

## 2020-11-09 PROCEDURE — 87635 SARS-COV-2 COVID-19 AMP PRB: CPT

## 2020-11-11 ENCOUNTER — HOSPITAL ENCOUNTER (OUTPATIENT)
Dept: NON INVASIVE DIAGNOSTICS | Age: 65
Discharge: HOME | End: 2020-11-11
Attending: INTERNAL MEDICINE
Payer: COMMERCIAL

## 2020-11-11 DIAGNOSIS — Z01.810 PRE-OPERATIVE CARDIOVASCULAR EXAMINATION: ICD-10-CM

## 2020-11-11 DIAGNOSIS — R94.31 ABNORMAL ELECTROCARDIOGRAM: ICD-10-CM

## 2020-11-11 LAB
SARS-COV-2, COV2NT: NOT DETECTED
STRESS ANGINA INDEX: 0
STRESS BASELINE HR: 72 BPM
STRESS ESTIMATED WORKLOAD: 5.8 METS
STRESS EXERCISE DUR MIN: NORMAL
STRESS PEAK DIAS BP: 80 MMHG
STRESS PEAK SYS BP: 180 MMHG
STRESS PERCENT HR ACHIEVED: 87 %
STRESS POST PEAK HR: 136 BPM
STRESS RATE PRESSURE PRODUCT: NORMAL BPM*MMHG
STRESS ST DEPRESSION: 0 MM
STRESS ST ELEVATION: 0 MM
STRESS TARGET HR: 157 BPM

## 2020-11-11 PROCEDURE — 93017 CV STRESS TEST TRACING ONLY: CPT

## 2020-11-13 ENCOUNTER — ANESTHESIA (OUTPATIENT)
Dept: SURGERY | Age: 65
End: 2020-11-13
Payer: COMMERCIAL

## 2020-11-13 ENCOUNTER — HOSPITAL ENCOUNTER (OUTPATIENT)
Age: 65
Setting detail: OUTPATIENT SURGERY
Discharge: HOME | End: 2020-11-13
Attending: ORTHOPAEDIC SURGERY | Admitting: ORTHOPAEDIC SURGERY
Payer: COMMERCIAL

## 2020-11-13 VITALS
BODY MASS INDEX: 39.08 KG/M2 | RESPIRATION RATE: 19 BRPM | TEMPERATURE: 97.2 F | WEIGHT: 220.56 LBS | DIASTOLIC BLOOD PRESSURE: 75 MMHG | SYSTOLIC BLOOD PRESSURE: 125 MMHG | OXYGEN SATURATION: 93 % | HEIGHT: 63 IN | HEART RATE: 73 BPM

## 2020-11-13 DIAGNOSIS — M75.41 ROTATOR CUFF IMPINGEMENT SYNDROME OF RIGHT SHOULDER: Primary | Chronic | ICD-10-CM

## 2020-11-13 PROCEDURE — 77030034478 HC TU IRR ARTHRO PT ARTH -B: Performed by: ORTHOPAEDIC SURGERY

## 2020-11-13 PROCEDURE — 74011250636 HC RX REV CODE- 250/636: Performed by: ORTHOPAEDIC SURGERY

## 2020-11-13 PROCEDURE — 76210000021 HC REC RM PH II 0.5 TO 1 HR: Performed by: ORTHOPAEDIC SURGERY

## 2020-11-13 PROCEDURE — 74011250637 HC RX REV CODE- 250/637: Performed by: ANESTHESIOLOGY

## 2020-11-13 PROCEDURE — 64415 NJX AA&/STRD BRCH PLXS IMG: CPT | Performed by: ANESTHESIOLOGY

## 2020-11-13 PROCEDURE — 77030002916 HC SUT ETHLN J&J -A: Performed by: ORTHOPAEDIC SURGERY

## 2020-11-13 PROCEDURE — C1713 ANCHOR/SCREW BN/BN,TIS/BN: HCPCS | Performed by: ORTHOPAEDIC SURGERY

## 2020-11-13 PROCEDURE — 77030006884 HC BLD SHV INCIS S&N -B: Performed by: ORTHOPAEDIC SURGERY

## 2020-11-13 PROCEDURE — 77030012508 HC MSK AIRWY LMA AMBU -A: Performed by: ANESTHESIOLOGY

## 2020-11-13 PROCEDURE — 74011250636 HC RX REV CODE- 250/636: Performed by: ANESTHESIOLOGY

## 2020-11-13 PROCEDURE — 74011250636 HC RX REV CODE- 250/636: Performed by: NURSE ANESTHETIST, CERTIFIED REGISTERED

## 2020-11-13 PROCEDURE — 76060000034 HC ANESTHESIA 1.5 TO 2 HR: Performed by: ORTHOPAEDIC SURGERY

## 2020-11-13 PROCEDURE — 77030020782 HC GWN BAIR PAWS FLX 3M -B: Performed by: ORTHOPAEDIC SURGERY

## 2020-11-13 PROCEDURE — 77030040361 HC SLV COMPR DVT MDII -B: Performed by: ORTHOPAEDIC SURGERY

## 2020-11-13 PROCEDURE — 74011000250 HC RX REV CODE- 250: Performed by: ANESTHESIOLOGY

## 2020-11-13 PROCEDURE — 76942 ECHO GUIDE FOR BIOPSY: CPT | Performed by: ORTHOPAEDIC SURGERY

## 2020-11-13 PROCEDURE — 77030004451 HC BUR SHV S&N -B: Performed by: ORTHOPAEDIC SURGERY

## 2020-11-13 PROCEDURE — 77030036565 HC WRP CLDTHER ANK S2SG -A: Performed by: ORTHOPAEDIC SURGERY

## 2020-11-13 PROCEDURE — 2709999900 HC NON-CHARGEABLE SUPPLY: Performed by: ORTHOPAEDIC SURGERY

## 2020-11-13 PROCEDURE — 74011000250 HC RX REV CODE- 250: Performed by: NURSE ANESTHETIST, CERTIFIED REGISTERED

## 2020-11-13 PROCEDURE — 76010000153 HC OR TIME 1.5 TO 2 HR: Performed by: ORTHOPAEDIC SURGERY

## 2020-11-13 PROCEDURE — 76210000016 HC OR PH I REC 1 TO 1.5 HR: Performed by: ORTHOPAEDIC SURGERY

## 2020-11-13 PROCEDURE — 77030002960 HC SUT PASS S&N -C: Performed by: ORTHOPAEDIC SURGERY

## 2020-11-13 PROCEDURE — 77030012711 HC WND ARTHRO ABLT S&N -D: Performed by: ORTHOPAEDIC SURGERY

## 2020-11-13 PROCEDURE — 77030016060 HC NDL NRV BLK TELE -A: Performed by: ANESTHESIOLOGY

## 2020-11-13 DEVICE — MULTIFIX S-ULTRA 5.5MM KNOTLESS ANCHOR
Type: IMPLANTABLE DEVICE | Site: SHOULDER | Status: FUNCTIONAL
Brand: MULTIFIX

## 2020-11-13 RX ORDER — MIDAZOLAM HYDROCHLORIDE 1 MG/ML
INJECTION, SOLUTION INTRAMUSCULAR; INTRAVENOUS AS NEEDED
Status: DISCONTINUED | OUTPATIENT
Start: 2020-11-13 | End: 2020-11-13 | Stop reason: HOSPADM

## 2020-11-13 RX ORDER — LIDOCAINE HYDROCHLORIDE 20 MG/ML
INJECTION, SOLUTION EPIDURAL; INFILTRATION; INTRACAUDAL; PERINEURAL AS NEEDED
Status: DISCONTINUED | OUTPATIENT
Start: 2020-11-13 | End: 2020-11-13 | Stop reason: HOSPADM

## 2020-11-13 RX ORDER — OXYCODONE AND ACETAMINOPHEN 5; 325 MG/1; MG/1
1 TABLET ORAL
Qty: 60 TAB | Refills: 0 | Status: SHIPPED
Start: 2020-11-13 | End: 2020-11-20

## 2020-11-13 RX ORDER — ROPIVACAINE HYDROCHLORIDE 5 MG/ML
INJECTION, SOLUTION EPIDURAL; INFILTRATION; PERINEURAL
Status: COMPLETED | OUTPATIENT
Start: 2020-11-13 | End: 2020-11-13

## 2020-11-13 RX ORDER — ACETAMINOPHEN 500 MG
1000 TABLET ORAL ONCE
Status: COMPLETED | OUTPATIENT
Start: 2020-11-13 | End: 2020-11-13

## 2020-11-13 RX ORDER — PROPOFOL 10 MG/ML
INJECTION, EMULSION INTRAVENOUS AS NEEDED
Status: DISCONTINUED | OUTPATIENT
Start: 2020-11-13 | End: 2020-11-13 | Stop reason: HOSPADM

## 2020-11-13 RX ORDER — NALOXONE HYDROCHLORIDE 0.4 MG/ML
0.1 INJECTION, SOLUTION INTRAMUSCULAR; INTRAVENOUS; SUBCUTANEOUS AS NEEDED
Status: DISCONTINUED | OUTPATIENT
Start: 2020-11-13 | End: 2020-11-13 | Stop reason: HOSPADM

## 2020-11-13 RX ORDER — DEXAMETHASONE SODIUM PHOSPHATE 4 MG/ML
INJECTION, SOLUTION INTRA-ARTICULAR; INTRALESIONAL; INTRAMUSCULAR; INTRAVENOUS; SOFT TISSUE AS NEEDED
Status: DISCONTINUED | OUTPATIENT
Start: 2020-11-13 | End: 2020-11-13 | Stop reason: HOSPADM

## 2020-11-13 RX ORDER — DIPHENHYDRAMINE HYDROCHLORIDE 50 MG/ML
12.5 INJECTION, SOLUTION INTRAMUSCULAR; INTRAVENOUS
Status: DISCONTINUED | OUTPATIENT
Start: 2020-11-13 | End: 2020-11-13 | Stop reason: HOSPADM

## 2020-11-13 RX ORDER — FENTANYL CITRATE 50 UG/ML
INJECTION, SOLUTION INTRAMUSCULAR; INTRAVENOUS AS NEEDED
Status: DISCONTINUED | OUTPATIENT
Start: 2020-11-13 | End: 2020-11-13 | Stop reason: HOSPADM

## 2020-11-13 RX ORDER — CEFAZOLIN SODIUM/WATER 2 G/20 ML
2 SYRINGE (ML) INTRAVENOUS ONCE
Status: COMPLETED | OUTPATIENT
Start: 2020-11-13 | End: 2020-11-13

## 2020-11-13 RX ORDER — FENTANYL CITRATE 50 UG/ML
25 INJECTION, SOLUTION INTRAMUSCULAR; INTRAVENOUS AS NEEDED
Status: DISCONTINUED | OUTPATIENT
Start: 2020-11-13 | End: 2020-11-13 | Stop reason: HOSPADM

## 2020-11-13 RX ORDER — SODIUM CHLORIDE 0.9 % (FLUSH) 0.9 %
5-40 SYRINGE (ML) INJECTION EVERY 8 HOURS
Status: DISCONTINUED | OUTPATIENT
Start: 2020-11-13 | End: 2020-11-13 | Stop reason: HOSPADM

## 2020-11-13 RX ORDER — PROCHLORPERAZINE EDISYLATE 5 MG/ML
5 INJECTION INTRAMUSCULAR; INTRAVENOUS
Status: DISCONTINUED | OUTPATIENT
Start: 2020-11-13 | End: 2020-11-13 | Stop reason: HOSPADM

## 2020-11-13 RX ORDER — HYDROMORPHONE HYDROCHLORIDE 1 MG/ML
0.5 INJECTION, SOLUTION INTRAMUSCULAR; INTRAVENOUS; SUBCUTANEOUS
Status: DISCONTINUED | OUTPATIENT
Start: 2020-11-13 | End: 2020-11-13 | Stop reason: HOSPADM

## 2020-11-13 RX ORDER — SODIUM CHLORIDE 0.9 % (FLUSH) 0.9 %
5-40 SYRINGE (ML) INJECTION AS NEEDED
Status: DISCONTINUED | OUTPATIENT
Start: 2020-11-13 | End: 2020-11-13 | Stop reason: HOSPADM

## 2020-11-13 RX ORDER — OXYCODONE AND ACETAMINOPHEN 5; 325 MG/1; MG/1
1 TABLET ORAL
Status: ON HOLD | COMMUNITY
End: 2022-10-28

## 2020-11-13 RX ORDER — ONDANSETRON 2 MG/ML
INJECTION INTRAMUSCULAR; INTRAVENOUS AS NEEDED
Status: DISCONTINUED | OUTPATIENT
Start: 2020-11-13 | End: 2020-11-13 | Stop reason: HOSPADM

## 2020-11-13 RX ORDER — FLUMAZENIL 0.1 MG/ML
0.2 INJECTION INTRAVENOUS
Status: DISCONTINUED | OUTPATIENT
Start: 2020-11-13 | End: 2020-11-13 | Stop reason: HOSPADM

## 2020-11-13 RX ORDER — DEXAMETHASONE SODIUM PHOSPHATE 4 MG/ML
INJECTION, SOLUTION INTRA-ARTICULAR; INTRALESIONAL; INTRAMUSCULAR; INTRAVENOUS; SOFT TISSUE
Status: COMPLETED | OUTPATIENT
Start: 2020-11-13 | End: 2020-11-13

## 2020-11-13 RX ORDER — DEXMEDETOMIDINE HYDROCHLORIDE 100 UG/ML
INJECTION, SOLUTION INTRAVENOUS
Status: COMPLETED | OUTPATIENT
Start: 2020-11-13 | End: 2020-11-13

## 2020-11-13 RX ORDER — GLYCOPYRROLATE 0.2 MG/ML
INJECTION INTRAMUSCULAR; INTRAVENOUS AS NEEDED
Status: DISCONTINUED | OUTPATIENT
Start: 2020-11-13 | End: 2020-11-13 | Stop reason: HOSPADM

## 2020-11-13 RX ORDER — SODIUM CHLORIDE, SODIUM LACTATE, POTASSIUM CHLORIDE, CALCIUM CHLORIDE 600; 310; 30; 20 MG/100ML; MG/100ML; MG/100ML; MG/100ML
125 INJECTION, SOLUTION INTRAVENOUS CONTINUOUS
Status: DISCONTINUED | OUTPATIENT
Start: 2020-11-13 | End: 2020-11-13 | Stop reason: HOSPADM

## 2020-11-13 RX ADMIN — MIDAZOLAM 2 MG: 1 INJECTION INTRAMUSCULAR; INTRAVENOUS at 09:27

## 2020-11-13 RX ADMIN — DEXAMETHASONE SODIUM PHOSPHATE 2 MG: 4 INJECTION, SOLUTION INTRAMUSCULAR; INTRAVENOUS at 09:13

## 2020-11-13 RX ADMIN — Medication 2 G: at 09:31

## 2020-11-13 RX ADMIN — SODIUM CHLORIDE, SODIUM LACTATE, POTASSIUM CHLORIDE, AND CALCIUM CHLORIDE 125 ML/HR: 600; 310; 30; 20 INJECTION, SOLUTION INTRAVENOUS at 08:20

## 2020-11-13 RX ADMIN — ROPIVACAINE HYDROCHLORIDE 20 ML: 5 INJECTION, SOLUTION EPIDURAL; INFILTRATION; PERINEURAL at 09:13

## 2020-11-13 RX ADMIN — DEXAMETHASONE SODIUM PHOSPHATE 4 MG: 4 INJECTION, SOLUTION INTRAMUSCULAR; INTRAVENOUS at 09:43

## 2020-11-13 RX ADMIN — DEXMEDETOMIDINE HYDROCHLORIDE 20 MCG: 100 INJECTION, SOLUTION INTRAVENOUS at 09:13

## 2020-11-13 RX ADMIN — LIDOCAINE HYDROCHLORIDE 80 MG: 20 INJECTION, SOLUTION EPIDURAL; INFILTRATION; INTRACAUDAL; PERINEURAL at 09:41

## 2020-11-13 RX ADMIN — SODIUM CHLORIDE, SODIUM LACTATE, POTASSIUM CHLORIDE, AND CALCIUM CHLORIDE: 600; 310; 30; 20 INJECTION, SOLUTION INTRAVENOUS at 10:15

## 2020-11-13 RX ADMIN — GLYCOPYRROLATE 0.2 MG: 0.2 INJECTION INTRAMUSCULAR; INTRAVENOUS at 09:27

## 2020-11-13 RX ADMIN — ONDANSETRON HYDROCHLORIDE 4 MG: 2 INJECTION INTRAMUSCULAR; INTRAVENOUS at 09:43

## 2020-11-13 RX ADMIN — ACETAMINOPHEN 1000 MG: 500 TABLET ORAL at 08:23

## 2020-11-13 RX ADMIN — FENTANYL CITRATE 100 MCG: 50 INJECTION, SOLUTION INTRAMUSCULAR; INTRAVENOUS at 09:41

## 2020-11-13 RX ADMIN — MIDAZOLAM 2 MG: 1 INJECTION INTRAMUSCULAR; INTRAVENOUS at 09:06

## 2020-11-13 RX ADMIN — PROPOFOL 200 MG: 10 INJECTION, EMULSION INTRAVENOUS at 09:41

## 2020-11-13 NOTE — PERIOP NOTES
TRANSFER - IN REPORT:    Verbal report received from [place], CRNA on Roz Benavidez  being received from OR for routine progression of care      Report consisted of patients Situation, Background, Assessment and   Recommendations(SBAR). Information from the following report(s) OR Summary, Procedure Summary, Intake/Output and MAR was reviewed with the receiving nurse. Opportunity for questions and clarification was provided. Assessment completed upon patients arrival to unit and care assumed.

## 2020-11-13 NOTE — ANESTHESIA PROCEDURE NOTES
Peripheral Block    Start time: 11/13/2020 9:06 AM  End time: 11/13/2020 9:13 AM  Performed by: Mary Guerrero MD  Authorized by: Mary Guerrero MD       Pre-procedure: Indications: at surgeon's request, post-op pain management and procedure for pain    Preanesthetic Checklist: patient identified, risks and benefits discussed, site marked, timeout performed, anesthesia consent given and patient being monitored    Timeout Time: 09:06          Block Type:   Block Type:   Interscalene and brachial plexus  Laterality:  Right  Monitoring:  Standard ASA monitoring, continuous pulse ox, frequent vital sign checks, heart rate, oxygen and responsive to questions  Injection Technique:  Single shot  Procedures: ultrasound guided    Patient Position: supine  Prep: chlorhexidine    Location:  Interscalene  Needle Type:  Stimuplex  Needle Gauge:  21 G  Needle Localization:  Ultrasound guidance  Medication Injected:  Ropivacaine (PF) (NAROPIN)(0.5%) 5 mg/mL injection, 20 mL  dexmedeTOMidine (PRECEDEX) 100 mcg/mL iv solution, 20 mcg  dexamethasone (DECADRON) 4 mg/mL injection, 2 mg  Med Admin Time: 11/13/2020 9:13 AM    Assessment:  Number of attempts:  1  Injection Assessment:  Incremental injection every 5 mL, negative aspiration for CSF, no paresthesia, ultrasound image on chart, local visualized surrounding nerve on ultrasound, negative aspiration for blood, no intravascular symptoms and low pressure verified by pressure monitor  Patient tolerance:  Patient tolerated the procedure well with no immediate complications

## 2020-11-13 NOTE — DISCHARGE INSTRUCTIONS
DISCHARGE SUMMARY from Nurse    PATIENT INSTRUCTIONS:    After general anesthesia or intravenous sedation, for 24 hours or while taking prescription Narcotics:  · Limit your activities  · Do not drive and operate hazardous machinery  · Do not make important personal or business decisions  · Do  not drink alcoholic beverages  · If you have not urinated within 8 hours after discharge, please contact your surgeon on call. Report the following to your surgeon:  · Excessive pain, swelling, redness or odor of or around the surgical area  · Temperature over 100.5  · Nausea and vomiting lasting longer than 4 hours or if unable to take medications  · Any signs of decreased circulation or nerve impairment to extremity: change in color, persistent  numbness, tingling, coldness or increase pain  · Any questions    What to do at Home:  Recommended activity: as directed by Dr. Nunu Sandra,     If you experience any of the following symptoms as listed above or as instructed by Dr Nunu Sandra, please follow up with Dr Nunu Sandra. *  Please give a list of your current medications to your Primary Care Provider. *  Please update this list whenever your medications are discontinued, doses are      changed, or new medications (including over-the-counter products) are added. *  Please carry medication information at all times in case of emergency situations. These are general instructions for a healthy lifestyle:    No smoking/ No tobacco products/ Avoid exposure to second hand smoke  Surgeon General's Warning:  Quitting smoking now greatly reduces serious risk to your health.     Obesity, smoking, and sedentary lifestyle greatly increases your risk for illness    A healthy diet, regular physical exercise & weight monitoring are important for maintaining a healthy lifestyle    You may be retaining fluid if you have a history of heart failure or if you experience any of the following symptoms:  Weight gain of 3 pounds or more overnight or 5 pounds in a week, increased swelling in our hands or feet or shortness of breath while lying flat in bed. Please call your doctor as soon as you notice any of these symptoms; do not wait until your next office visit. Patient armband removed and shredded. Learning About Coronavirus [phone])  Coronavirus [phone]): Overview  What is coronavirus (COVID-19)? The coronavirus disease (COVID-19) is caused by a virus. It is an illness that was first found in December 2019. It has since spread worldwide. The virus can cause fever, cough, and trouble breathing. In severe cases, it can cause pneumonia and make it hard to breathe without help. It can cause death. This virus spreads person-to-person through droplets from coughing and sneezing. It can also spread when you are close to someone who is infected. And it can spread when you touch something that has the virus on it, such as a doorknob or a tabletop. Coronaviruses are a large group of viruses. They cause the common cold. They also cause more serious illnesses like [place] respiratory syndrome (MERS) and severe acute respiratory syndrome (SARS). COVID-19 is caused by a novel coronavirus. That means it's a new type that has not been seen in people before. How is COVID-19 treated? Mild illness can be treated at home, but more serious illness needs to be treated in the hospital. Treatment may include medicines to reduce symptoms, plus breathing support such as oxygen therapy or a ventilator. Other treatments, such as antiviral medicines, may help people who have COVID-19. What can you do to protect yourself from COVID-19? The best way to protect yourself from getting sick is to:  · Avoid areas where there is an outbreak. · Avoid contact with people who may be infected. · Avoid crowds and try to stay at least 6 feet away from other people. · Wash your hands often, especially after you cough or sneeze.  Use soap and water, and scrub for at least 20 seconds. If soap and water aren't available, use an alcohol-based hand . · Avoid touching your mouth, nose, and eyes. What can you do to avoid spreading the virus to others? To help avoid spreading the virus to others:  · Wash your hands often with soap or alcohol-based hand sanitizers. · Cover your mouth with a tissue when you cough or sneeze. Then throw the tissue in the trash. · Use a disinfectant to clean things that you touch often. These include doorknobs, remote controls, phones, and handles on your refrigerator and microwave. And don't forget countertops, tabletops, bathrooms, and computer keyboards. · Wear a cloth face cover if you have to go to public areas. If you know or suspect that you have COVID-19:  · Stay home. Don't go to school, work, or public areas. And don't use public transportation, ride-shares, or taxis unless you have no choice. · Leave your home only if you need to get medical care or testing. But call the doctor's office first so they know you're coming. And wear a face cover. · Limit contact with people in your home. If possible, stay in a separate bedroom and use a separate bathroom. · Wear a face cover whenever you're around other people. It can help stop the spread of the virus when you cough or sneeze. · Clean and disinfect your home every day. Use household  and disinfectant wipes or sprays. Take special care to clean things that you grab with your hands. · Self-isolate until it's safe to be around others again. ? If you have symptoms, it's safe when you haven't had a fever for 3 days and your symptoms have improved and it's been at least 10 days since your symptoms started. ? If you were exposed to the virus but don't have symptoms, it's safe to be around others 14 days after exposure. ? Talk to your doctor about whether you also need testing, especially if you have a weakened immune system.   When to call for help  Call 911 anytime you think you may need emergency care. For example, call if:  · You have severe trouble breathing. (You can't talk at all.)  · You have constant chest pain or pressure. · You are severely dizzy or lightheaded. · You are confused or can't think clearly. · Your face and lips have a blue color. · You passed out (lost consciousness) or are very hard to wake up. Call your doctor now if you develop symptoms such as:  · Shortness of breath. · Fever. · Cough. If you need to get care, call ahead to the doctor's office for instructions before you go. Make sure you wear a face cover to prevent exposing other people to the virus. Where can you get the latest information? The following health organizations are tracking and studying this virus. Their websites contain the most up-to-date information. Chance Needles also learn what to do if you think you may have been exposed to the virus. · U.[place]. Centers for Disease Control and Prevention (CDC): The CDC provides updated news about the disease and travel advice. The website also tells you how to prevent the spread of infection. www.cdc.gov  · World Health Organization [facility]): WHO offers information about the virus outbreaks. WHO also has travel advice. www.who.int  Current as of: July 10, 2020               Content Version: 12.6  © 2006-2020 [employer]. Care instructions adapted under license by [employer] (which disclaims liability or warranty for this information). If you have questions about a medical condition or this instruction, always ask your healthcare professional. [place] [address] any warranty or liability for your use of this information. The discharge information has been reviewed with the patient and caregiver. The patient and caregiver verbalized understanding.   Discharge medications reviewed with the patient and caregiver and appropriate educational materials and side effects teaching were provided.   ___________________________________________________________________________________________________________________________________

## 2020-11-13 NOTE — ANESTHESIA PREPROCEDURE EVALUATION
Relevant Problems   No relevant active problems       Anesthetic History   No history of anesthetic complications            Review of Systems / Medical History  Patient summary reviewed, nursing notes reviewed and pertinent labs reviewed    Pulmonary  Within defined limits                 Neuro/Psych   Within defined limits           Cardiovascular  Within defined limits                     GI/Hepatic/Renal  Within defined limits              Endo/Other        Obesity and arthritis     Other Findings              Physical Exam    Airway  Mallampati: II  TM Distance: 4 - 6 cm  Neck ROM: normal range of motion   Mouth opening: Normal     Cardiovascular    Rhythm: regular  Rate: normal         Dental         Pulmonary  Breath sounds clear to auscultation               Abdominal  GI exam deferred       Other Findings            Anesthetic Plan    ASA: 2  Anesthesia type: general      Post-op pain plan if not by surgeon: peripheral nerve block single    Induction: Intravenous  Anesthetic plan and risks discussed with: Patient      Discussed risks of nerve block including infection, bleeding, nerve injury, shortness of breath and block failure.

## 2020-11-13 NOTE — PERIOP NOTES
Discharge instructions reviewed with Alejandra amaro. Time given for questions. All questions answered.

## 2020-11-13 NOTE — INTERVAL H&P NOTE
Update History & Physical    The Patient's History and Physical of November 7, 2020 was reviewed with the patient and I examined the patient. There was no change. The surgical site was confirmed by the patient and me. Plan:  The risk, benefits, expected outcome, and alternative to the recommended procedure have been discussed with the patient. Patient understands and wants to proceed with the procedure.     Electronically signed by Thang Pennington MD on 11/13/2020 at 9:10 AM

## 2020-11-13 NOTE — PERIOP NOTES
Patient drinking cranberry juice and eating saltine crackers. Tolerating all po intake without difficulty or nausea.

## 2020-11-13 NOTE — PERIOP NOTES
Reviewed PTA medication list with patient/caregiver and patient/caregiver denies any additional medications. Patient admits to having a responsible adult care for them at home for at least 24 hours after surgery. Patient encouraged to use gown warming system and informed that using said warming gown to regulate body temperature prior to a procedure has been shown to help reduce the risks of blood clots and infection. Patient's pharmacy of choice verified and documented in PTA medication section. Dual skin assessment & fall risk band verification completed with HEBER Hendrix RN.

## 2020-11-13 NOTE — ANESTHESIA POSTPROCEDURE EVALUATION
Procedure(s):  RIGHT SHOULDER ARTHROSCOPIC DECOMPRESSION, RESECTION DISTAL CLAVICLE ROTATOR CUFF REPAIR GEN WITH SCALENE BLOCK PRN. general    Anesthesia Post Evaluation      Multimodal analgesia: multimodal analgesia used between 6 hours prior to anesthesia start to PACU discharge  Patient location during evaluation: PACU  Patient participation: complete - patient participated  Level of consciousness: awake and alert  Pain management: satisfactory to patient  Airway patency: patent  Anesthetic complications: no  Cardiovascular status: acceptable, hemodynamically stable and blood pressure returned to baseline  Respiratory status: acceptable and nasal cannula  Hydration status: acceptable  Post anesthesia nausea and vomiting:  none  Final Post Anesthesia Temperature Assessment:  Normothermia (36.0-37.5 degrees C)      INITIAL Post-op Vital signs:   Vitals Value Taken Time   /63 11/13/2020 12:15 PM   Temp 36.3 °C (97.4 °F) 11/13/2020 11:09 AM   Pulse 51 11/13/2020 12:20 PM   Resp 17 11/13/2020 12:20 PM   SpO2 95 % 11/13/2020 12:20 PM   Vitals shown include unvalidated device data.

## 2020-11-13 NOTE — OP NOTES
PREOPERATIVE DIAGNOSES:    1. Rotator cuff tear, right shoulder  2. Impingement. 3. Degenerative arthritis of the acromioclavicular joint. POSTOPERATIVE DIAGNOSES:    1. Rotator cuff tear, right shoulder  2. Impingement. 3. Degenerative arthritis of the acromioclavicular joint. PROCEDURES PERFORMED:    1. Arthroscopic decompression. 2. Resection distal clavicle. 3. Rotator cuff repair, right shoulder. SURGEON:  Ellie Ortiz. Tam Muir MD    ANESTHESIOLOGIST:  Anesthesiologist: Abad Ponce MD  CRNA: Royce Patrick CRNA    ASSISTANTS: Circ-1: Dior Awan RN  Circ-Relief: Tevin Ma RN  Scrub Tech-1: Sp Hurley  Scrub Tech-2: Lenard Guillaume  Scrub Private/Assistant: Lubna Giang CNA    ANESTHESIA:   General anesthesia after scalene block. COMPLICATIONS:  None. SPECIMENS: None    BLOOD LOSS:  Minimal.      DESCRIPTION OF PROCEDURE:  This 63y.o.-year-old female, with a history of persistent pain in the right shoulder, unresponsive to conservative care. The patient had a MRI showing the above noted findings and was then scheduled for surgery. PROCEDURE:  The patient was taken to the operating room and given general anesthesia after a scalene block. When it was adequate, the rightshoulder was examined and showed full motion with no gross instability. The patient was placed in a left lateral decubitus position. The right shoulder was placed in traction, prepped and draped in a normal manner and injected with 30 mL of 1% Marcaine with Epinephrine. Anterior and posterior stab wounds were made, and a standard arthroscopic examination was performed. This revealed a tear of the supraspinatus and the undersurface of the tear was debrided until all of the nonviable tissue was removed. The biceps and the superior labrum were normal and the articular surfaces of the joint appeared normal.  The instruments were then redirected in the subacromial space.     A lateral portal was established and a limited bursectomy was carried out. The full thickness nature of the rotator cuff tear was confirmed. The cuff was mobilized and could be brought back down into anatomic position without difficulty. The soft tissue was removed from the anterior acromion and distal clavicle, including the coracoacromial ligament. There was a sharp hooked appearance on the anterior acromion, and severe arthritic changes of the [facility] joint with complete loss of joint cartilage, and large inferior osteophytes. A high-speed bur was used to perform an acromioplasty. The same level of resection was carried across the distal clavicle. The arthroscope was switched to the lateral portal to assure that adequate bone removal had been achieved, and the result was a flat acromion. The [facility] joint was approached directly through the anterior portal.  The most medial few millimeters of the acromion and the distal centimeter of the clavicle were removed arthroscopically. Bleeding points were treated with the electrocautery wand for hemostasis. The original footprint was cleaned of soft tissue and a high-speed bur was used to create a bleeding surface. A single row Ultra-Tape technique was used for the repair. Two Ultra-Tape sutures were passed through the edge of the cuff in a vertical mattress technique. They were then secured at the footprint with two Multifix S 5.5mm PEEK anchors resulting in a solid repair. The instruments were removed. The wounds were closed using 3-0 nylon suture. A sterile compressive dressing was applied, along with a sling. The patient left the operating room in satisfactory condition.

## 2020-11-16 ENCOUNTER — PATIENT OUTREACH (OUTPATIENT)
Dept: OTHER | Age: 65
End: 2020-11-16

## 2020-11-16 NOTE — PROGRESS NOTES
Initial HPRP:   Patient on report as discharged from [facility] OP Visit 11/13/20 for Proc: RIGHT SHOULDER ARTHROSCOPIC DECOMPRESSION, RESECTION DISTAL CLAVICLE ROTATOR CUFF REPAIR  . Initial attempt to contact patient for transitions of care.  Left discreet message on voicemail with this Care Coordinator's contact information.  Will attempt outreach on 11/17/20.      POSTOPERATIVE DIAGNOSES:    1. Rotator cuff tear, right shoulder  2. Impingement.   Degenerative arthritis of the acromioclavicular joint    oxycodone-acetaminophen 5-325 mg per tablet    Call your doctor now or seek immediate medical care if:  Excessive pain, swelling, redness or odor of or around the surgical area  Temperature over 100.5  Nausea and vomiting lasting longer than 4 hours or if unable to take medications  Any signs of decreased circulation or nerve impairment to extremity: change in color, persistent numbness, tingling, coldness or increase pain

## 2020-11-17 ENCOUNTER — PATIENT OUTREACH (OUTPATIENT)
Dept: OTHER | Age: 65
End: 2020-11-17

## 2020-11-19 ENCOUNTER — PATIENT OUTREACH (OUTPATIENT)
Dept: OTHER | Age: 65
End: 2020-11-19

## 2020-11-19 NOTE — PROGRESS NOTES
HPRP progress note    Patient eligible for Trever Anne 994 care management    Received notification of discharge from THE [facility] OP on 11/13/20 for  Proc: RIGHT SHOULDER ARTHROSCOPIC DECOMPRESSION, RESECTION DISTAL CLAVICLE [address]. Contacted patient to discuss post discharge needs and offer care management services. Two patient identifiers verified. Discussed the care management program.  Patient agrees to care management services at this time. PMH:   Past Medical History:   Diagnosis Date    Fall 2019    slipped at work in PACU and tore glute muscle    OA (osteoarthritis)     right knee    Osteoarthritis of right knee 1/7/2018       Social History:   Social History     Socioeconomic History    Marital status:      Spouse name: Not on file    Number of children: Not on file    Years of education: Not on file    Highest education level: Not on file   Occupational History    Not on file   Social Needs    Financial resource strain: Not on file    Food insecurity     Worry: Not on file     Inability: Not on file    Transportation needs     Medical: Not on file     Non-medical: Not on file   Tobacco Use    Smoking status: Current Every Day Smoker     Packs/day: 0.25     Years: 10.00     Pack years: 2.50    Smokeless tobacco: Never Used    Tobacco comment: no smoking 24 hours prior to surgery   Substance and Sexual Activity    Alcohol use:  Yes     Alcohol/week: 4.0 standard drinks     Types: 4 Glasses of wine per week    Drug use: No    Sexual activity: Not on file   Lifestyle    Physical activity     Days per week: Not on file     Minutes per session: Not on file    Stress: Not on file   Relationships    Social connections     Talks on phone: Not on file     Gets together: Not on file     Attends Worship service: Not on file     Active member of club or organization: Not on file     Attends meetings of clubs or organizations: Not on file     Relationship status: Not on file    Intimate partner violence     Fear of current or ex partner: Not on file     Emotionally abused: Not on file     Physically abused: Not on file     Forced sexual activity: Not on file   Other Topics Concern    Not on file   Social History Narrative    Not on file         Care management assessment completed:    *Spoke with patient who reports that she is in pain. · Rates pain as 8 on 0-10 pain scale. Patient will take her pain medication which provides some relief. Refill given at appointment. · Patient states that she just returned from her initial [address] Appointment and he did some exercises with her arm. *Patient states that she continues to use sling  · No swelling, numbness, tingling noted. · Incision is healing well with no s/s of infection. *Patient states that she still has lifting restrictions but has some lite exercises to do. Condition Focused Assessment:   POSTOPERATIVE DIAGNOSES:    1. Rotator cuff tear, right shoulder  2. Impingement. Degenerative arthritis of the acromioclavicular joint     Surgical/Wound Condition Focused Assessment    Skin- any open wounds or incisions? yes  Description and location of wound- Right Shoulder Port Incisions  In the last 24 hour have you experienced; Fever no    Low body temperature no    Chills or shaking no    Sweating no    Fast heart rate no    Fast breathing no    Dizziness/lightheadedness no    Confusion or unusual change in mental status no    Diarrhea no    Nausea no    Vomiting no    Shortness of breath or difficulty breathing no    Less urine output no    Cold, clammy, and pale skin no     Skin rash or skin color changes no  New or worsening pain?  yes  If yes, pain rated 0-10: 8 Location/pain characteristics: Sharp   New or worsening numbness or tingling? no  If yes, location of numbness and tingling: N/A    Patient reported important numbers to know:  Temperature 97.4 °F   Heart rate 51   Last /63   Weight 220 lb 9 oz Activity level- moving several times a day, or as recommended? yes  Abnormal activity level reported: N/A   Bathing and showering instructions? yes  Nutrition- prescribed diet? no   Tolerating food? yes  Hydration- (how much in ounces per day) Plenty of fluids  Medications- new antibiotic? no             Pain medication? yes  Does patient understand how and when to take their medications? yes  If no, instructed patient on proper medication use? yes  Does patient have incentive spirometer? no  If yes, how frequently is patient using incentive spirometer? N/A      Red Flags:  Call your doctor now or seek immediate medical care if:  Excessive pain, swelling, redness or odor of or around the surgical area  Temperature over 100.5  Nausea and vomiting lasting longer than 4 hours or if unable to take medications  Any signs of decreased circulation or nerve impairment to extremity: change in color, persistent numbness, tingling, coldness or increase pain    Medications:  New Medications at Discharge:    oxycodone-acetaminophen 5-325 mg per tablet    Changed Medications at Discharge: None Noted  Discontinued Medications at Discharge: None Noted  Current Outpatient Medications   Medication Sig    oxyCODONE-acetaminophen (Percocet) 5-325 mg per tablet Take 1 Tab by mouth every four (4) hours as needed for Pain.  oxyCODONE-acetaminophen (PERCOCET) 5-325 mg per tablet Take 1 Tab by mouth every four (4) hours as needed for Pain for up to 7 days. Max Daily Amount: 6 Tabs.  ibuprofen (MOTRIN) 400 mg tablet Take  by mouth every six (6) hours as needed for Pain.  acetaminophen (TYLENOL EXTRA STRENGTH) 500 mg tablet Take 1,000 mg by mouth every six (6) hours as needed for Pain. No current facility-administered medications for this visit. There are no discontinued medications. Performed medication reconciliation with patient, and patient verbalizes understanding of administration of home medications.   There were no barriers to obtaining medications identified at this time. Preventative Care     Health Maintenance   Topic Date Due    Hepatitis C Screening  1955    Pneumococcal 0-64 years (1 of 1 - PPSV23) 05/15/1963    DTaP/Tdap/Td series (1 - Tdap) 05/15/1978    PAP AKA CERVICAL CYTOLOGY  05/15/1978    Shingrix Vaccine Age 50> (1 of 2) 05/15/2007    Breast Cancer Screen Mammogram  05/15/2007    Flu Vaccine (1) 09/01/2020    Lipid Screen  10/30/2025    Colorectal Cancer Screening Combo  10/09/2027       CM Identified  Problems  (Contributing problems for risk for readmission)   1. Pain  2. Risk of Infection  3. Potential Learning Needs      Goals:   Patients pain will be controlled at 5/10 or less with pain medication and/or non-pharmacologic methods. ? 11/19/20 -  Rates pain as 8 on 0-10 pain scale - had just returned from [address] [place] [address] no signs of Complications or Red Flags in 30 day  Reviewed and discussed importance of monitoring and reporting Red Flags  ? 11/19/20 - Denies s/s of infection or Red Flag Symptoms     Completes all Follow-Up appointments within 30 days of Discharge  Educated on importance of Follow Up for prevention of complications  ? 11/19/20 - States initial [place] [id number] appointment was today 11/19/20 with Torrie Valdes MD [facility] Surgery. ? Next Follow Up is Tuesday, December 17, 2020. Barriers/Support system:  patient and son    Home health/DME in place per discharge orders    Barriers/Challenges to Care: []  Decline in memory    []  Language barrier     []  Emotional                  []  Limited mobility  []  Lack of motivation     [] Vision, hearing or cognitive impairment []  Knowledge [] Financial Barriers []  Lack of support  []  Pain []  Other []  None    PCP/Specialist follow up: Patient is not scheduled to follow up with Kanwal Martinez MD at this time.      ? States initial [place] [address] appointment was today 11/19/20 with Torrie Valdes MD Ortho Surgery. ? Next Follow Up is Tuesday, December 17, 2020. Reviewed red flags with patient, and patient verbalizes understanding. Patient given an opportunity to ask questions. No other clinical/social/functional needs noted. The patient agrees to contact the PCP office for questions related to their healthcare. The patient expressed thanks, offered no additional questions and ended the call. Plan for next call: Updated from previous note.  F/u - Post Op Pain, exercices

## 2020-12-02 ENCOUNTER — PATIENT OUTREACH (OUTPATIENT)
Dept: OTHER | Age: 65
End: 2020-12-02

## 2020-12-02 NOTE — PROGRESS NOTES
HPRP follow up. Patient on with  THE [facility] OP visit for Proc: RIGHT SHOULDER ARTHROSCOPIC DECOMPRESSION, RESECTION DISTAL CLAVICLE ROTATOR CUFF REPAIR.     with D/C 20 . Contacted patient for Health Promotion and Risk Prevention services. Verified  and Zipcode for HIPAA security. *Spoke with patient who reports that she is in pain. ? Rates pain as 5-6 on 0-10 pain scale. Patient takes her pain medication at night because that is when she states her pain is the worse.      *Patient states that she continues to use sling intermittently. ? No swelling, numbness, tingling noted. ? Incision is healing well with no s/s of infection.     *Patient states that she still has lifting restrictions but has some light exercises to do. Chart review:  Not able to view Ortho notes in [facility]          Goals:   Patients pain will be controlled at 5/10 or less with pain medication and/or non-pharmacologic methods. ? 20 -  Rates pain as 8 on 0-10 pain scale - had just returned from Ortho Appointment  ? 20 - Rates pain as 5-6 on 0-10 pain scale.     Demonstrates no signs of Complications or Red Flags in 30 day  Reviewed and discussed importance of monitoring and reporting Red Flags  § 20 - Denies s/s of infection or Red Flag Symptoms  § 20 - Denies s/s of infection or Red Flag Symptoms     Completes all Follow-Up appointments within 30 days of Discharge  Educated on importance of Follow Up for prevention of complications  ? 20 - States initial Post Op appointment was today 20 with Roberth Hinson MD Ortho Surgery. ? 20 - Next Follow Up is Thursday, 2020. Patient had no other questions or concerns. Contact information provided and reminded her that I can be reached if any needs rise. Will follow for one month for transitions of care needs. Next outreach: 20 to f/u -  Post OP Appt and Resolve Episode.

## 2020-12-22 ENCOUNTER — PATIENT OUTREACH (OUTPATIENT)
Dept: OTHER | Age: 65
End: 2020-12-22

## 2020-12-22 NOTE — PROGRESS NOTES
HPRP follow up. Final call made today. Contacted patient for transitions of care services. Verified   and Zip Code for HIPAA security. Spoke with patient who reports that she is in pain. ? States pain is getting better. Rates pain as 3-4  on 0-10 pain scale. Patient takes her pain medication at night because that is when she states her pain is the worse.      *Patient states no swelling, numbness, tingling noted. ? Incision is healing well with no s/s of infection.     *Patient states that she still has lifting restrictions but has some light exercises to do.     Chart review:  Not able to view Ortho notes in [address]:   Patients pain will be controlled at 5/10 or less with pain medication and/or non-pharmacologic methods. ? 20 -  Rates pain as 8 on 0-10 pain scale - had just returned from Ortho Appointment  ? 20 - Rates pain as 5-6 on 0-10 pain scale. ? 20 - Rates pain as 3-4 on 0-10 pain scale     Demonstrates no signs of Complications or Red Flags in 30 day  Reviewed and discussed importance of monitoring and reporting Red Flags  § 20 - Denies s/s of infection or Red Flag Symptoms  § 20 - Denies s/s of infection or Red Flag Symptoms  § 20 - Denies s/s of infection or Red Flag Symptoms     Completes all Follow-Up appointments within 30 days of Discharge  Educated on importance of Follow Up for prevention of complications  ? 20 - States initial Post Op appointment was today 20 with Anant Kwan MD Ortho Surgery. ? 20 - Next Follow Up is .   ? 20 - Patient attended follow up appointment - went well. Continuing with PT and progressing with exercises.       Patient had no other questions or concerns. Contact information provided and reminded her that I can be reached if any needs arise in the future    Resolving current episode 20(Transitions of care complete).   No further ED/UC or hospital admissions within 30 days post discharge. Patient attended follow-up appointments as directed. No outreach from patient to [address] [place] [address].

## 2021-01-08 NOTE — PROGRESS NOTES
[facility]  [address] [place], [address]  Ph [phone]  Fx [phone]    Physical Therapy Discharge Summary    Patient name: Tracie Jimenez Start of Care: 2020   Referral source: Jeffry Bonds MD : 1955   Medical/Treatment Diagnosis: Right knee pain [M25.561]  Left hip pain [M25.552] Onset Date:     Prior Hospitalization: see medical history Provider#: [id number]   Medications: Verified on Patient Summary List    Comorbidities: arthritis, tobacco use, right TKA , wears glasses  Prior Level of Function:functionally independent, no AD, active lifestyle    Visits from Start of Care: 7    Missed Visits: 7      Summary of Care:    Patient has been discharged from skilled physical therapy due to noncompliance with POC. Patient has not returned for any follow up visits since 2020. Cancelled or no showed 7 visits.     ASSESSMENT/RECOMMENDATIONS:  [x]Discontinue therapy: []Patient has reached or is progressing toward set goals      [x]Patient is non-compliant or has abdicated      []Due to lack of appreciable progress towards set goals    Colette Yi 2021 8:25 AM

## 2021-12-26 ENCOUNTER — HOSPITAL ENCOUNTER (EMERGENCY)
Age: 66
Discharge: HOME | End: 2021-12-26
Attending: EMERGENCY MEDICINE
Payer: MEDICAID

## 2021-12-26 VITALS
HEART RATE: 100 BPM | OXYGEN SATURATION: 97 % | WEIGHT: 235 LBS | DIASTOLIC BLOOD PRESSURE: 77 MMHG | BODY MASS INDEX: 43.24 KG/M2 | SYSTOLIC BLOOD PRESSURE: 125 MMHG | RESPIRATION RATE: 16 BRPM | HEIGHT: 62 IN | TEMPERATURE: 98.6 F

## 2021-12-26 DIAGNOSIS — J06.9 VIRAL UPPER RESPIRATORY TRACT INFECTION: Primary | ICD-10-CM

## 2021-12-26 LAB
COVID-19 RAPID TEST, COVR: NOT DETECTED
FLUAV AG NPH QL IA: NEGATIVE
FLUBV AG NOSE QL IA: NEGATIVE
SOURCE, COVRS: NORMAL

## 2021-12-26 PROCEDURE — 87804 INFLUENZA ASSAY W/OPTIC: CPT

## 2021-12-26 PROCEDURE — 87635 SARS-COV-2 COVID-19 AMP PRB: CPT

## 2021-12-26 PROCEDURE — 99282 EMERGENCY DEPT VISIT SF MDM: CPT

## 2021-12-26 NOTE — ED PROVIDER NOTES
EMERGENCY DEPARTMENT HISTORY AND PHYSICAL EXAM    Date: 12/26/2021  Patient Name: Jaycee Islas    History of Presenting Illness     Chief Complaint   Patient presents with    Concern For LWHEW-52 (Coronavirus)         History Provided By: Patient    3:32 PM  Jaycee Islas is a 61 y.o. female with PMHX of obesity, active tobacco smoker, fully vaccinated for COVID-19 but not vaccinated for flu who presents to the emergency department C/O 3 days of fever, cough, shortness of breath, congestion, chills, body aches. No known sick contacts but did recently travel to [place]. No clear alleviating exacerbating factors identified. PCP: Jessica Acharya MD    Current Outpatient Medications   Medication Sig Dispense Refill    oxyCODONE-acetaminophen (Percocet) 5-325 mg per tablet Take 1 Tab by mouth every four (4) hours as needed for Pain.  ibuprofen (MOTRIN) 400 mg tablet Take  by mouth every six (6) hours as needed for Pain.  acetaminophen (TYLENOL EXTRA STRENGTH) 500 mg tablet Take 1,000 mg by mouth every six (6) hours as needed for Pain. Past History       Past Medical History:  Past Medical History:   Diagnosis Date    Fall 2019    slipped at work in PACU and tore glute muscle    OA (osteoarthritis)     right knee    Osteoarthritis of right knee 1/7/2018       Past Surgical History:  Past Surgical History:   Procedure Laterality Date    COLONOSCOPY N/A 10/9/2017    COLONOSCOPY performed by Adithya Wu MD at [address] Right 01/2018    HX ORTHOPAEDIC Left 06/30/2016    carpal tunnel release    HX ORTHOPAEDIC Right     foot surgery    HX ORTHOPAEDIC Right 2019    carpal tunnel    HX TUBAL LIGATION         Family History:  No family history on file.     Social History:  Social History     Tobacco Use    Smoking status: Current Every Day Smoker     Packs/day: 0.25     Years: 10.00     Pack years: 2.50    Smokeless tobacco: Never Used    Tobacco comment: no smoking 24 hours prior to surgery   Substance Use Topics    Alcohol use: Yes     Alcohol/week: 4.0 standard drinks     Types: 4 Glasses of wine per week    Drug use: No       Allergies: Allergies   Allergen Reactions    Ultram [Tramadol] Itching     Nervousness, can't sleep         Review of Systems   Review of Systems   Constitutional: Positive for chills and fever. HENT: Positive for congestion. Respiratory: Positive for cough and shortness of breath. Cardiovascular: Negative for chest pain. Gastrointestinal: Negative for abdominal pain. Musculoskeletal: Positive for myalgias. All other systems reviewed and are negative. Physical Exam     Vitals:    12/26/21 1446   BP: 125/77   Pulse: 100   Resp: 16   Temp: 98.6 °F (37 °C)   SpO2: 97%   Weight: 106.6 kg (235 lb)   Height: 5' 2\" (1.575 m)     Physical Exam    Nursing notes and vital signs reviewed    Constitutional: Non toxic appearing, moderate distress  Head: Normocephalic, Atraumatic  Eyes: EOMI  Neck: Supple  Cardiovascular: Regular rate and rhythm, no murmurs, rubs, or gallops  Chest: Normal work of breathing and chest excursion bilaterally  Lungs: Clear to ausculation bilaterally  Back: No evidence of trauma or deformity  Extremities: No evidence of trauma or deformity  Skin: Warm and dry, normal cap refill  Neuro: Alert and appropriate  Psychiatric: Normal mood and affect      Diagnostic Study Results     Labs -   No results found for this or any previous visit (from the past 12 hour(s)). Radiologic Studies -   No orders to display     CT Results  (Last 48 hours)    None        CXR Results  (Last 48 hours)    None          Medications given in the ED-  Medications - No data to display      Medical Decision Making   I am the first provider for this patient. I reviewed the vital signs, available nursing notes, past medical history, past surgical history, family history and social history.     Vital Signs-Reviewed the patient's vital signs. Pulse Oximetry Analysis - 97% on room air, not hypoxic     Records Reviewed: Nursing Notes    Provider Notes (Medical Decision Making): Joby Hanson is a 61 y.o. female senting with history and exam consistent with URI type symptoms in the setting of being fully vaccinated for COVID-19. Patient is hemodynamically stable without evidence of respiratory distress. Not hypoxic on room air. Covid-19 testing was sent and is pending. Plan for discharge with strict quarantine instructions and return precautions. Patient understands and agrees with this plan. Procedures:  Procedures    ED Course:       Diagnosis and Disposition     Critical Care: None    DISCHARGE NOTE:    Aarti Norton  results have been reviewed with her. She has been counseled regarding her diagnosis, treatment, and plan. She verbally conveys understanding and agreement of the signs, symptoms, diagnosis, treatment and prognosis and additionally agrees to follow up as discussed. She also agrees with the care-plan and conveys that all of her questions have been answered. I have also provided discharge instructions for her that include: educational information regarding their diagnosis and treatment, and list of reasons why they would want to return to the ED prior to their follow-up appointment, should her condition change. She has been provided with education for proper emergency department utilization. CLINICAL IMPRESSION:    1. Viral upper respiratory tract infection        PLAN:  1. D/C Home  2. Discharge Medication List as of 12/26/2021  4:30 PM        3.    Follow-up Information     Follow up With Specialties Details Why Contact Neha Richmond MD Family Medicine  As needed [address] [address] [phone]      [facility] EMERGENCY DEPT Emergency Medicine  As needed, If symptoms worsen [address] [address]  [phone] _______________________________      Please note that this dictation was completed with TravelMuse, the computer voice recognition software. Quite often unanticipated grammatical, syntax, homophones, and other interpretive errors are inadvertently transcribed by the computer software. Please disregard these errors. Please excuse any errors that have escaped final proofreading.

## 2022-08-01 ENCOUNTER — HOSPITAL ENCOUNTER (EMERGENCY)
Age: 67
Discharge: HOME | End: 2022-08-01
Attending: STUDENT IN AN ORGANIZED HEALTH CARE EDUCATION/TRAINING PROGRAM
Payer: MEDICARE

## 2022-08-01 VITALS
WEIGHT: 205 LBS | TEMPERATURE: 98 F | RESPIRATION RATE: 16 BRPM | HEART RATE: 80 BPM | BODY MASS INDEX: 36.32 KG/M2 | DIASTOLIC BLOOD PRESSURE: 73 MMHG | SYSTOLIC BLOOD PRESSURE: 108 MMHG | OXYGEN SATURATION: 100 % | HEIGHT: 63 IN

## 2022-08-01 DIAGNOSIS — Z20.822 ENCOUNTER FOR LABORATORY TESTING FOR COVID-19 VIRUS: Primary | ICD-10-CM

## 2022-08-01 LAB
SARS-COV-2, COV2: NORMAL
SARS-COV-2, NAA: NOT DETECTED

## 2022-08-01 PROCEDURE — U0003 INFECTIOUS AGENT DETECTION BY NUCLEIC ACID (DNA OR RNA); SEVERE ACUTE RESPIRATORY SYNDROME CORONAVIRUS 2 (SARS-COV-2) (CORONAVIRUS DISEASE [COVID-19]), AMPLIFIED PROBE TECHNIQUE, MAKING USE OF HIGH THROUGHPUT TECHNOLOGIES AS DESCRIBED BY CMS-2020-01-R: HCPCS

## 2022-08-01 PROCEDURE — 99283 EMERGENCY DEPT VISIT LOW MDM: CPT

## 2022-08-01 NOTE — DISCHARGE INSTRUCTIONS
Please carefully read all discharge instructions    Please follow-up with a primary care physician and if you do not have one currently use the contact information provided to obtain an appointment. If none was provided please call the number on the back of your insurance card to locate a Primary care doctor. Many offices have \"cancellation lists\" that you can ask to be placed on; should a patient with an earlier appointment cancel you will be notified to take their place. Please return to the Emergency Room immediately if your symptoms worsen. Please return to the Emergency Department if you develop a fever, chills, cannot eat or drink due to nausea or vomiting, or if any of your symptoms worsen. If you do not have insurance you can use the below for your medications. [email or web address]    What are GoodRx coupons? GoodRx coupons will help you pay less than the cash price for your prescription. [place] Mor free to use and are accepted at virtually every [place] pharmacy. Your pharmacist will know how to enter the codes on the coupon to pull up the lowest discount available. [employer] Activation    Thank you for requesting access to [employer]. Please follow the instructions below to securely access and download your online medical record. [employer] allows you to send messages to your doctor, view your test results, renew your prescriptions, schedule appointments, and more. How Do I Sign Up? In your internet browser, go to [email or web address]  Click on the First Time User? Click Here link in the Sign In box. You will be redirect to the New Member Sign Up page. Enter your [employer] Access Code exactly as it appears below. You will not need to use this code after youve completed the sign-up process. If you do not sign up before the expiration date, you must request a new code. [employer] Access Code:  Activation code not generated  Current [employer] Status: Active    Enter the last four digits of your Social Security Number (xxxx) and Date of Birth (mm/dd/yyyy) as indicated and click Submit. You will be taken to the next sign-up page. Create a CodeSealer ID. This will be your Code[employer] login ID and cannot be changed, so think of one that is secure and easy to remember. Create a CodeSealer password. You can change your password at any time. Enter your Password Reset Question and Answer. This can be used at a later time if you forget your password. Enter your e-mail address. You will receive e-mail notification when new information is available in [address] Click Sign Up. You can now view and download portions of your medical record. Click the BetterWorks (Closed) link to download a portable copy of your medical information. Additional Information    If you have questions, please visit the Frequently Asked Questions section of the CodeSealer website at [email or web address] Remember, CodeSealer is NOT to be used for urgent needs. For medical emergencies, dial 911.

## 2022-08-01 NOTE — ED PROVIDER NOTES
EMERGENCY DEPARTMENT HISTORY AND PHYSICAL EXAM      Date: 8/1/2022  Patient Name: Darien Arias    History of Presenting Illness     Chief Complaint   Patient presents with    Covid Testing     History Provided By: Patient    HPI:  Darien Arias is a 74 y.o. femalewho presents today for concern of COVID-19 infection. Recent exposure to known COVID-19. Denies any current symptoms at this time. She is here with brother, who is known positive and daughter is who also positive by home test.     The patient denies any aggravating or alleviating factors - and has not taken any medications in an attempt to alleviate her symptoms. PMH, PSH, family history, social history, allergies reviewed with the patient with significant items noted above. PCP: Jessica Acharya MD    Current Outpatient Medications   Medication Sig Dispense Refill    oxyCODONE-acetaminophen (Percocet) 5-325 mg per tablet Take 1 Tab by mouth every four (4) hours as needed for Pain. ibuprofen (MOTRIN) 400 mg tablet Take  by mouth every six (6) hours as needed for Pain. acetaminophen (TYLENOL EXTRA STRENGTH) 500 mg tablet Take 1,000 mg by mouth every six (6) hours as needed for Pain. Past History     Past Medical History:  Past Medical History:   Diagnosis Date    Fall 2019    slipped at work in PACU and tore glute muscle    OA (osteoarthritis)     right knee    Osteoarthritis of right knee 1/7/2018       Past Surgical History:  Past Surgical History:   Procedure Laterality Date    COLONOSCOPY N/A 10/9/2017    COLONOSCOPY performed by Meenakshi Bush MD at [address] [place] [address] Right 01/2018    HX ORTHOPAEDIC Left 06/30/2016    carpal tunnel release    HX ORTHOPAEDIC Right     foot surgery    HX ORTHOPAEDIC Right 2019    carpal tunnel    HX TUBAL LIGATION         Family History:  History reviewed. No pertinent family history.     Social History:  Social History     Tobacco Use    Smoking status: Every Day     Packs/day: 0.25     Years: 10.00     Pack years: 2.50     Types: Cigarettes    Smokeless tobacco: Never    Tobacco comments:     no smoking 24 hours prior to surgery   Substance Use Topics    Alcohol use: Yes     Alcohol/week: 4.0 standard drinks     Types: 4 Glasses of wine per week    Drug use: No       Allergies: Allergies   Allergen Reactions    Ultram [Tramadol] Itching     Nervousness, can't sleep       Review of Systems   Review of Systems    In addition to that documented in the HPI above  All other review of systems negative    Constitutional: Denies fevers or chills  Eyes: Denies vision changes  ENMT: Denies sore throat  CV: Denies chest pain  Resp: Denies SOB  GI: Denies vomiting or diarrhea  : Denies painful urination  MSK: Denies recent trauma  Skin: Denies new rashes  Neuro: Denies new numbness or tingling or weakness  Endocrine: Denies polyuria  Heme: Denies bleeding disorders    Physical Exam     Vitals:    08/01/22 0757   BP: 108/73   Pulse: 80   Resp: 16   Temp: 98 °F (36.7 °C)   SpO2: 100%   Weight: 93 kg (205 lb)   Height: 5' 3\" (1.6 m)     Physical Exam    Nursing notes and vital signs reviewed  General: Patient is awake and alert, resting comfortably in no acute distress  Head: Normocephalic, Atraumatic  Eyes: EOMI, no conjunctival pallor  Neck: Supple, Normal external exam  Cardiovascular: RRR, warm, well-perfused extremities  Chest: Normal work of breathing and chest excursion bilaterally  Respiratory: Patient is in no respiratory distress  Abdomen: Soft, non tender, non distended  Back: No evidence of trauma or deformity  Extremities: No evidence of trauma or deformity, no LE edema  Skin: Warm and dry, intact  Neuro: Alert and appropriate, CN intact, normal speech, strength and sensation full and symmetric bilaterally, normal gait, normal coordination  Psychiatric: Normal mood and affect    Medical Decision Making   I am the first provider for this patient.     I reviewed the vital signs, available nursing notes, past medical history, past surgical history, family history and social history. Vital Signs-Reviewed the patient's vital signs. Visit Vitals  /73 (BP 1 Location: Left arm)   Pulse 80   Temp 98 °F (36.7 °C)   Resp 16   Ht 5' 3\" (1.6 m)   Wt 93 kg (205 lb)   SpO2 100%   BMI 36.31 kg/m²       Pulse Oximetry Analysis - 100% on room air     Cardiac Monitor:  Rate: 80 bpm  Rhythm: NSR    Provider Notes (Medical Decision Making):   Dav Ortiz is a 74 y.o. female here for covid-19 screening swab, recent exposure. No symptoms at this time. Procedures:  Procedures    ED Course:             Do not suspect systemic bacterial infection. Well appearing, medical screening exam complete. Patient presents for COVID 19 testing with normal oxygen saturation and mild URI symptoms or COVID 19 exposure. COVID 19 testing was not conducted. The patient was given quarantine/isolation recommendations and agrees with the plan to be discharged home. They were provided instructions to return for difficulty breathing, chest pain, altered mentation, or any other new or worsening symptoms    No acute pathology necessitating further emergent workup or hospital admission is suspected or found. Will discharge home with follow up. Expressed the importance of follow up for current symptoms and she agrees and was advised on what signs/symptoms to return immediately to the ER. Vitals Review/addressed -     Diagnostic Study Results     Orders Placed This Encounter    SARS-COV-2 BY BRE     Standing Status:   Standing     Number of Occurrences:   1    SARS-COV-2     Standing Status:   Standing     Number of Occurrences:   1     Order Specific Question:   Specimen source     Answer:   Nasal [[id number]]     Order Specific Question:   Is this test for diagnosis or screening? Answer:   Screening     Order Specific Question:   Symptomatic for COVID-19 as defined by CDC?      Answer:   No     Order Specific Question:   Hospitalized for COVID-19? Answer:   No     Order Specific Question:   Admitted to ICU for COVID-19? Answer:   No     Order Specific Question:   Employed in healthcare setting? Answer:   No     Order Specific Question:   Resident in a congregate (group) care setting? Answer:   No     Order Specific Question:   Pregnant? Answer:   No     Order Specific Question:   Previously tested for COVID-19? Answer:   Yes       Labs -     No results found for this or any previous visit (from the past 12 hour(s)). Radiologic Studies -   No orders to display     CT Results  (Last 48 hours)      None          CXR Results  (Last 48 hours)      None            Disposition     Disposition:  Home    CLINICAL IMPRESSION:    1. Encounter for laboratory testing for COVID-19 virus        It should be noted that I will be the provider of record for this patient  Chandni Reyes MD    Follow-up Information    None         Discharge Medication List as of 8/1/2022  8:39 AM          Please note that this dictation was completed with SPARQCode, the computer voice recognition software. Quite often unanticipated grammatical, syntax, homophones, and other interpretive errors are inadvertently transcribed by the computer software. Please disregard these errors. Please excuse any errors that have escaped final proofreading.

## 2022-10-28 ENCOUNTER — HOSPITAL ENCOUNTER (OUTPATIENT)
Age: 67
Setting detail: OUTPATIENT SURGERY
Discharge: HOME | End: 2022-10-28
Attending: INTERNAL MEDICINE | Admitting: INTERNAL MEDICINE
Payer: MEDICARE

## 2022-10-28 VITALS
RESPIRATION RATE: 14 BRPM | BODY MASS INDEX: 39.25 KG/M2 | TEMPERATURE: 97.3 F | DIASTOLIC BLOOD PRESSURE: 75 MMHG | SYSTOLIC BLOOD PRESSURE: 129 MMHG | OXYGEN SATURATION: 99 % | WEIGHT: 221.5 LBS | HEART RATE: 62 BPM | HEIGHT: 63 IN

## 2022-10-28 PROCEDURE — G0500 MOD SEDAT ENDO SERVICE >5YRS: HCPCS | Performed by: INTERNAL MEDICINE

## 2022-10-28 PROCEDURE — 76040000007: Performed by: INTERNAL MEDICINE

## 2022-10-28 PROCEDURE — 2709999900 HC NON-CHARGEABLE SUPPLY: Performed by: INTERNAL MEDICINE

## 2022-10-28 PROCEDURE — 77030040361 HC SLV COMPR DVT MDII -B: Performed by: INTERNAL MEDICINE

## 2022-10-28 PROCEDURE — 99153 MOD SED SAME PHYS/QHP EA: CPT | Performed by: INTERNAL MEDICINE

## 2022-10-28 PROCEDURE — 74011250636 HC RX REV CODE- 250/636: Performed by: INTERNAL MEDICINE

## 2022-10-28 RX ORDER — SODIUM CHLORIDE 9 MG/ML
1000 INJECTION, SOLUTION INTRAVENOUS CONTINUOUS
Status: CANCELLED | OUTPATIENT
Start: 2022-10-28 | End: 2022-10-28

## 2022-10-28 RX ORDER — ATROPINE SULFATE 0.1 MG/ML
0.5 INJECTION INTRAVENOUS
Status: CANCELLED | OUTPATIENT
Start: 2022-10-28 | End: 2022-10-29

## 2022-10-28 RX ORDER — SODIUM CHLORIDE 9 MG/ML
125 INJECTION, SOLUTION INTRAVENOUS CONTINUOUS
Status: DISCONTINUED | OUTPATIENT
Start: 2022-10-28 | End: 2022-10-28 | Stop reason: HOSPADM

## 2022-10-28 RX ORDER — NALOXONE HYDROCHLORIDE 0.4 MG/ML
0.4 INJECTION, SOLUTION INTRAMUSCULAR; INTRAVENOUS; SUBCUTANEOUS
Status: CANCELLED | OUTPATIENT
Start: 2022-10-28 | End: 2022-10-28

## 2022-10-28 RX ORDER — SODIUM CHLORIDE 0.9 % (FLUSH) 0.9 %
5-40 SYRINGE (ML) INJECTION EVERY 8 HOURS
Status: CANCELLED | OUTPATIENT
Start: 2022-10-28

## 2022-10-28 RX ORDER — DIPHENHYDRAMINE HYDROCHLORIDE 50 MG/ML
50 INJECTION, SOLUTION INTRAMUSCULAR; INTRAVENOUS ONCE
Status: CANCELLED | OUTPATIENT
Start: 2022-10-28 | End: 2022-10-28

## 2022-10-28 RX ORDER — SODIUM CHLORIDE 0.9 % (FLUSH) 0.9 %
5-40 SYRINGE (ML) INJECTION AS NEEDED
Status: CANCELLED | OUTPATIENT
Start: 2022-10-28

## 2022-10-28 RX ORDER — MIDAZOLAM HYDROCHLORIDE 1 MG/ML
.25-5 INJECTION, SOLUTION INTRAMUSCULAR; INTRAVENOUS
Status: DISCONTINUED | OUTPATIENT
Start: 2022-10-28 | End: 2022-10-28 | Stop reason: HOSPADM

## 2022-10-28 RX ORDER — EPINEPHRINE 0.1 MG/ML
1 INJECTION INTRACARDIAC; INTRAVENOUS
Status: CANCELLED | OUTPATIENT
Start: 2022-10-28 | End: 2022-10-29

## 2022-10-28 RX ORDER — DEXTROMETHORPHAN/PSEUDOEPHED 2.5-7.5/.8
1.2 DROPS ORAL
Status: CANCELLED | OUTPATIENT
Start: 2022-10-28

## 2022-10-28 RX ORDER — FLUMAZENIL 0.1 MG/ML
0.2 INJECTION INTRAVENOUS
Status: CANCELLED | OUTPATIENT
Start: 2022-10-28 | End: 2022-10-28

## 2022-10-28 RX ORDER — FENTANYL CITRATE 50 UG/ML
100 INJECTION, SOLUTION INTRAMUSCULAR; INTRAVENOUS
Status: DISCONTINUED | OUTPATIENT
Start: 2022-10-28 | End: 2022-10-28 | Stop reason: HOSPADM

## 2022-10-28 RX ADMIN — SODIUM CHLORIDE 125 ML/HR: 9 INJECTION, SOLUTION INTRAVENOUS at 11:04

## 2022-10-28 NOTE — DISCHARGE INSTRUCTIONS
Alex Kapadia  [id number]  1955    COLON DISCHARGE INSTRUCTIONS    Discomfort:  Redness at IV site- apply warm compress to area; if redness or soreness persist- contact your physician  There may be a slight amount of blood passed from the rectum  Gaseous discomfort- walking, belching will help relieve any discomfort  You may not operate a vehicle til the next day. You may not engage in an occupation involving machinery or appliances til the next day. You may not drink alcoholic beverages til the next day. DIET:   High fiber diet. ACTIVITY:  You may not  resume your normal daily activities til the next day. it is recommended that you spend the remainder of the day resting -  avoid any strenuous activity. CALL M.D.  IF ANY SIGN OF:   Increasing pain, nausea, vomiting  Abdominal distension (swelling)  New increased bleeding (oral or rectal)  Fever (chills)  Pain in chest area  Bloody discharge from nose or mouth  Shortness of breath    You may  take any Advil, Aspirin, Ibuprofen, Motrin, Aleve, or Goodys ut preferably Tylenol as needed for pain. Post procedure diagnosis:  skin tag; weak anal tone; sigmoid diverticulosis; tortuous sigmoid; medium internal hemorrhoids    Follow-up Instructions: Your follow up colonoscopy will be in 10 years. Criss Hollins MD  October 28, 2022       DISCHARGE SUMMARY from Nurse    PATIENT INSTRUCTIONS:    After general anesthesia or intravenous sedation, for 24 hours or while taking prescription Narcotics:  Limit your activities  Do not drive and operate hazardous machinery  Do not make important personal or business decisions  Do  not drink alcoholic beverages  If you have not urinated within 8 hours after discharge, please contact your surgeon on call.     Report the following to your surgeon:  Excessive pain, swelling, redness or odor of or around the surgical area  Temperature over 100.5  Nausea and vomiting lasting longer than 4 hours or if unable to take medications  Any signs of decreased circulation or nerve impairment to extremity: change in color, persistent  numbness, tingling, coldness or increase pain  Any questions    What to do at Home:  Recommended activity: as directed in post colonoscopy instructions. If you experience any of the following symptoms as directed in post colonoscopy instructions, please follow up with Dr. Wander Sapp. *  Please give a list of your current medications to your Primary Care Provider. *  Please update this list whenever your medications are discontinued, doses are      changed, or new medications (including over-the-counter products) are added. *  Please carry medication information at all times in case of emergency situations. These are general instructions for a healthy lifestyle:    No smoking/ No tobacco products/ Avoid exposure to second hand smoke  Surgeon General's Warning:  Quitting smoking now greatly reduces serious risk to your health. Obesity, smoking, and sedentary lifestyle greatly increases your risk for illness    A healthy diet, regular physical exercise & weight monitoring are important for maintaining a healthy lifestyle    You may be retaining fluid if you have a history of heart failure or if you experience any of the following symptoms:  Weight gain of 3 pounds or more overnight or 5 pounds in a week, increased swelling in our hands or feet or shortness of breath while lying flat in bed. Please call your doctor as soon as you notice any of these symptoms; do not wait until your next office visit. The discharge information has been reviewed with the patient and brother. The patient and brother verbalized understanding. Discharge medications reviewed with the patient and brother and appropriate educational materials and side effects teaching were provided.     Patient armband removed and shredded      ___________________________________________________________________________________________________________________________________

## 2022-10-28 NOTE — H&P
Assessment/Plan  # Detail Type Description    1. Assessment Abnormal finding on dx imaging of other specified body structure (R93.89). Impression Transvaginal  US 22  Uterus is absent. Right Ovary appears heterogenous. Normal Flow. Left ovary  not seen. With in  the left adnexa the bowel appears prominent. Bowel wall thickness  appears wnl. There is a poorly defined area  with increased echogenicity seen on the posterior bowel wall measuring 0.81 x 0.49 x 1.2 cm with internal and peripheral flow seen. A second area distally measuring 0.62 x 0.71 x 0.83 cm. also with internal and peripheral flow seen. No evidence of free fluid or pelvis mass. TVS and US were performed. Patient Plan see below. would do colonoscopy    Plan Orders Occult Blood, Fecal, IA to be performed. 2. Assessment Constipation, unspecified (K59.00). Patient Plan has one bm every day but once every 2 weeks she may skip a day where she has to use the glove to get extraction. she doesn't take laxatives. last colonoscopy 10/9/ 2017 without difficulty no diverticulosis no polyps. no fx hx of colon cancer. Mother  at 68 from pancreatic cancer and sister  at 76 -74 from liver cancer. she smokes 4 to 6 cigarets/ day. 2 glasses of wine every night after having knee surgery. her mother used to drink. she takes occasional NSAID's for pain. she denied having dyspepsia, heartburns, n/vx or dysphagia. she gained weight. has been on disability x 2 years. She had tubal ligation hysterectomy with unilateral oophorectomy. US of the pelvis done 2022 spoke about posterior bowel wall thickening > 1.2 so we need to repeat her colonoscopy soon. she had already 2 negative colonoscopies so in my opinion it is not something serious? Average risk for colon cancer, asymptomatic except for occasional constipation where she has to use the finger for digital disimpaction. was having lower abdominal pain but no longer.  Will schedule colonoscopy in the near future. I explained to the patient the procedure and the risks involved with colonoscopy, including but not limited to bleeding, perforation or missing a lesion if the bowels are not well clean or are unusually tortious and difficult. I prescribed  the  approved prep and advised to drink plenty of fluids. She was agreeable to proceed and answered her questions. This 74year old  patient was referred by Kimmy Fatima. This 74year old female presents for Abnormal GI Study. History of Present Illness  1. Abnormal GI Study   Onset: on 2022. Type of study:  Trans vaginal US. Abnormality:  Bowel wall thickness. Pertinent negatives include abdominal pain, bloating, constipation, diarrhea, dysphagia, nausea and vomiting. Additional information:  Transvaginal 2022        Past Medical/Surgical History   (Detailed)  Disease/disorder Onset Date Management Date Comments   H/O: hysterectomy  H/O: hysterectomy       Knee replacement, total 2018      foot surgery 2001      tubal ligation 2001    Measles       Neuropathic neuralgia       Arthritis       Back trouble       Chickenpox           Gynecologic History  Patient is postmenopausal.   Type of menopause is natural .      Family History   (Detailed)    Relationship Family Member Name  Age at Death Condition Onset Age Cause of Death       Family history of Hypertension  N       Family history of Stroke  N       Family history of Seizure disorder  N       Family history of Alcoholism  N   Father  Y [de-identified]      Mother  Y 67 Cancer, pancreas  Y   Sister  Y 76 Cancer, breast  Y     Social History  (Detailed)  Preferred language is English. Marital Status/Family/Social Support  Marital status: Single     Tobacco use status: Ex-cigarette smoker. Smoking status: Former smoker. Tobacco/Vaping Exposure  No passive smoke exposure. Alcohol  There is a history of alcohol use. Type: Beer and wine. More than 5 glasses consumed weekly. Patient quit drinking in 2017. Caffeine  The patient uses caffeine: soda and coffee. - 1 cup a day. Medications (active prior to today)  Medication Instructions Start Date Stop Date Refilled Elsewhere   Aleve 220 mg capsule  //   Y   Voltaren 1 % topical gel apply (2G)  by topical route 3 times every day to the affected area(s) 04/15/2021 10/20/2022 04/15/2021 N   Cymbalta 30 mg capsule,delayed release take 1 capsule by oral route  every day 04/15/2021 10/20/2022  N   meclizine 25 mg tablet take 1 tablet by oral route 3 times every day as needed 04/15/2021 10/20/2022  N   ibuprofen 400 mg tablet Take by mouth every six (6) hours as needed for Pain. //   Y   Non-Aspirin Extra Strength 500 mg tablet Take 1,000 mg by mouth every six (6) hours as needed for Pain. //   Y   albuterol sulfate HFA 90 mcg/actuation aerosol inhaler inhale 2 puff by inhalation route every 4 - 6 hours as needed 01/21/2022 10/20/2022 01/21/2022 N   benzonatate 100 mg capsule take 1 capsule by oral route 3 times every day as needed for cough 01/21/2022 10/20/2022 01/21/2022 N   metronidazole 0.75 % vaginal gel insert 1 applicatorful by vaginal route  every day at bedtime for 5 nights 06/28/2022 10/20/2022  N   Macrobid 100 mg capsule take 1 capsule by oral route  every 12 hours with food 06/28/2022 10/20/2022 06/28/2022 N   ciprofloxacin 500 mg tablet take 1 tablet by oral route  every day 07/02/2022 10/20/2022  N   ibuprofen 800 mg tablet take 1 by Oral route 2 times every day 08/10/2022  08/10/2022 N       Medication Reconciliation  Medications reconciled today. Medication Reviewed  Adherence Medication Name Sig Desc Elsewhere Status   taking as directed Aleve 220 mg capsule  Y Verified   taking as directed ibuprofen 400 mg tablet Take by mouth every six (6) hours as needed for Pain.  Y Verified   taking as directed Non-Aspirin Extra Strength 500 mg tablet Take 1,000 mg by mouth every six (6) hours as needed for Pain. Y Verified   taking as directed ibuprofen 800 mg tablet take 1 by Oral route 2 times every day N Verified     Medications (Added, Continued or Stopped today)  Start Date Medication Directions PRN Status PRN Reason Instruction Stop Date   01/21/2022 albuterol sulfate HFA 90 mcg/actuation aerosol inhaler inhale 2 puff by inhalation route every 4 - 6 hours as needed N   10/20/2022    Aleve 220 mg capsule  N      01/21/2022 benzonatate 100 mg capsule take 1 capsule by oral route 3 times every day as needed for cough N   10/20/2022   07/02/2022 ciprofloxacin 500 mg tablet take 1 tablet by oral route  every day N   10/20/2022   04/15/2021 Cymbalta 30 mg capsule,delayed release take 1 capsule by oral route  every day N   10/20/2022    ibuprofen 400 mg tablet Take by mouth every six (6) hours as needed for Pain. N      08/10/2022 ibuprofen 800 mg tablet take 1 by Oral route 2 times every day N      06/28/2022 Macrobid 100 mg capsule take 1 capsule by oral route  every 12 hours with food N   10/20/2022   04/15/2021 meclizine 25 mg tablet take 1 tablet by oral route 3 times every day as needed N   10/20/2022   06/28/2022 metronidazole 0.75 % vaginal gel insert 1 applicatorful by vaginal route  every day at bedtime for 5 nights N   10/20/2022    Non-Aspirin Extra Strength 500 mg tablet Take 1,000 mg by mouth every six (6) hours as needed for Pain. N      04/15/2021 Voltaren 1 % topical gel apply (2G)  by topical route 3 times every day to the affected area(s) N   10/20/2022     Allergies  Ingredient Reaction (Severity) Medication Name Comment   TRAMADOL Itching (moderate)     TRAMADOL HCL itching Ultram      Reviewed, no changes. Review of Systems  System Neg/Pos Details   Constitutional Negative Chills, Fever, Malaise and Weight loss. ENMT Negative Ear infections, Nasal congestion, Sinus Infection and Sore throat. Eyes Negative Double vision and Eye pain.    Respiratory Negative Asthma, Chronic cough, Dyspnea, Pleuritic pain and Wheezing. Cardio Negative Chest pain, Edema and Irregular heartbeat/palpitations. GI Negative Abdominal pain, Bloating, Change in bowel habits, Constipation, Decreased appetite, Diarrhea, Dysphagia, Heartburn, Hematemesis, Hematochezia, Melena, Nausea, Reflux and Vomiting.  Negative Dysuria, Hematuria, Urinary frequency, Urinary incontinence and Urinary retention. Endocrine Negative Cold intolerance, Heat intolerance and Increased thirst.   Neuro Negative Dizziness, Headache, Numbness, Tremors and Vertigo. Psych Negative Anxiety, Depression and Increased stress. Integumentary Negative Hives, Pruritus and Rash. MS Negative Back pain, Joint pain and Myalgia. Hema/Lymph Negative Easy bleeding, Easy bruising and Lymphadenopathy. Reproductive Positive The patient is post-menopausal (The menopause was natural). Reproductive Negative Breast lumps, Breast pain and Vaginal discharge. Vital Signs   Gynecologic History  Patient is postmenopausal.      Height  Time ft in cm Last Measured Height Position   10:34 AM 5.0 2.00 157.48 10/20/2022 Standing     Weight/BSA/BMI  Time lb oz kg Context BMI kg/m2 BSA m2   10:34 .00  102.965  41.52      Date/Time Temp Pulse BP Cardiac Rhythm [facility]   10/28/22 1037 98 °F (36.7 °C) 63 130/78 -- AS     Respiratory Therapy (last day)    Date/Time Resp SpO2 O2 Device O2 Flow Rate (L/min) Who   10/28/22 1037 16 100 % None (Room air) -- AS     Physical  Exam  Exam Findings Details   Constitutional Normal No acute distress. Well Nourished. Well developed.    Eyes Normal General - Right: Normal, Left: Normal. Conjunctiva - Right: Normal, Left: Normal. Sclera - Right: Normal, Left: Normal. Cornea - Right: Normal, Left: Normal. Pupil - Right: Normal, Left: Normal.   Nose/Mouth/Throat Normal Lips/teeth/gums - Normal. Tongue - Normal. Buccal mucosa - Normal. Palate & uvula - Normal.   Neck Exam Normal Inspection - Normal. Palpation - Normal. Thyroid gland - Normal. Cervical lymph nodes - Normal.   Respiratory Normal Inspection - Normal. Auscultation - Normal. Percussion - Normal. Cough - Absent. Effort - Normal.   Cardiovascular Normal Heart rate - Regular rate. Heart sounds - Normal S1, Normal S2. Murmurs - None. Extremities - No edema. Abdomen Normal Inspection - Normal. Appliance(s) - None. Abdominal muscles - Normal. Auscultation - Normal. Percussion - Normal. Anterior palpation - Normal, No guarding, No rebound. CVA tenderness - None. Umbilicus - Normal. Abdominal reflexes - Normal. No abdominal tenderness. No hepatic enlargement. No splenic enlargement. No hernia. No Ascites. No palpable mass. Louis's sign - Negative. Skin Normal Inspection - Normal.   Musculoskeletal Normal Hands - Left: Normal, Right: Normal.   Extremity Normal No cyanosis. No edema. Clubbing - Absent. Neurological Normal Level of consciousness - Normal. Orientation - Normal. Memory - Normal. Motor - Normal. Balance & gait - Normal. Coordination - Normal. Fine motor skills - Normal. DTRs - Normal.   Psychiatric Normal Orientation - Oriented to time, place, person & situation. Not anxious. Appropriate mood and affect. Behavior appropriate for age. Sufficient language. No memory loss.      Active Patient Care Team Members  Name Contact Agency Type Support Role Relationship Active Date Inactive Date Specialty   Jessica Acharya   Patient provider PCP   Family Practice   Clement    encounter provider    Gastroenterology   Jimbo Shah    Spouse          Patient questioned and examined

## 2022-10-28 NOTE — PROCEDURES
[facility]  Colonoscopy Procedure Report  _______________________________________________________  Patient: Galina Solorzano                                        Attending Physician: Robby Munoz MD    Patient ID: [id number]                                    Referring Physician: Eva Ivy MD    Exam Date: 10/28/2022      Introduction: A  74 y.o. female patient, presents for inpatient Colonoscopy    Indications: US 22  Uterus is absent. Right Ovary appears heterogenous. Normal Flow. Left ovary  not seen. With in  the left adnexa the bowel appears prominent. Bowel wall thickness  appears wnl. There is a poorly defined area  with increased echogenicity seen on the posterior bowel wall measuring 0.81 x 0.49 x 1.2 cm with internal and peripheral flow seen. A second area distally measuring 0.62 x 0.71 x 0.83 cm. also with internal and peripheral flow seen. No evidence of free fluid or pelvis mass. TVS and US were performed. has one bm every day but once every 2 weeks she may skip a day where she has to use the glove to get extraction. she doesn't take laxatives. last colonoscopy 10/9/ 2017 without difficulty no diverticulosis no polyps. no fx hx of colon cancer. Mother  at 68 from pancreatic cancer and sister  at 76 -74 from liver cancer. she smokes 4 to 6 cigarets/ day. 2 glasses of wine every night after having knee surgery. her mother used to drink. she takes occasional NSAID's for pain. she denied having dyspepsia, heartburns, n/vx or dysphagia. she gained weight. has been on disability x 2 years. She had tubal ligation hysterectomy with unilateral oophorectomy. US of the pelvis done 2022 spoke about posterior bowel wall thickening > 1.2 so we need to repeat her colonoscopy soon. she had already 2 negative colonoscopies so in my opinion it is not something serious? Occult blood in the stools is negative. Consent:  The benefits, risks, and alternatives to the procedure were discussed and informed consent was obtained from the patient. Preparation: EKG, pulse, pulse oximetry and blood pressure were monitored throughout the procedure. ASA Classification: Class II- . The heart is an S1-S2 and regular heart rate and rhythm. Lungs are clear to auscultation and percussion. Abdomen is soft, nondistended, and nontender. Mental Status: awake, alert, and oriented to person, place, and time    Medications:  Fentanyl 100 mcg IV before procedure. Versed 7 mg IV throughout the procedure. Rectal Exam: Few large anal skin tags. Decrease anal sphincter tone. No Blood. Pathology Specimens:  0    Procedure: The colonoscope was passed with ease through the anus under direct visualization and advanced to the cecum and 5 cm inside the terminal ileum. Retroflexion is made in the rectum and ascending colon. The patient required positioning on the back to aid in the passage of the scope. The scope was withdrawn and the mucosa was carefully examined. The quality of the preparation was very good. The views were excellent. The patient's toleration of the procedure was excellent. The exam was done twice to the cecum. Total time is 19 minutes and withdrawal time is 12 minutes. Findings:    Rectum:   Medium sized internal hemorrhoids. Sigmoid:   Tortuous sigmoid colon with mild sigmoid diverticulosis. Descending Colon:   Normal   Transverse Colon:   Normal   Ascending Colon:   Normal  Cecum:   Normal  Terminal Ileum:   Normal.       Unplanned Events: There were no unplanned events. Estimated Blood Loss: None  IMPLANTS: * No implants in log *  Impressions: Few large anal skin tags. Decrease anal sphincter tone. Medium sized internal hemorrhoids. Tortuous sigmoid colon with mild sigmoid diverticulosis. Normal Mucosa. No blood, tumor, polyps or AVM found. Complications: None; patient tolerated the procedure well.     Recommendations:  Discharge home when standard parameters are met.  Resume a high fiber diet. Resume own medications. Colonoscopy recommendation in 10 years.   Take Miralax and/ or Colace 100 mg on regular basis if constipated    Procedure Codes:    COLONOSCOPY [[id number]]    Endoscope Information:  Model Number(s)    [id number]   Assistant: None  Signed By: Can Nicole MD Date: 10/28/2022

## (undated) DEVICE — SPONGE GZ W4XL4IN COT 12 PLY TYP VII WVN C FLD DSGN

## (undated) DEVICE — TRAY PHAR SYR 30ML PLAS LUERLOCK TIP N CTRL CONVENIENCE

## (undated) DEVICE — UNDERCAST PADDING: Brand: DEROYAL

## (undated) DEVICE — ULTRATAPE 2MM BLUE 38 PKG OF 6

## (undated) DEVICE — SUT ETHLN 3-0 18IN PS2 BLK --

## (undated) DEVICE — CANISTER SUCTION HI FLO 30000CC FLUID MGMT SYS TRUCLEAR DISP

## (undated) DEVICE — KENDALL SCD EXPRESS SLEEVES, KNEE LENGTH, MEDIUM: Brand: KENDALL SCD

## (undated) DEVICE — NDL FLTR TIP 5 MIC 18GX1.5IN --

## (undated) DEVICE — MEDI-VAC NON-CONDUCTIVE SUCTION TUBING: Brand: CARDINAL HEALTH

## (undated) DEVICE — STERILE POLYISOPRENE POWDER-FREE SURGICAL GLOVES WITH EMOLLIENT COATING: Brand: PROTEXIS

## (undated) DEVICE — TRAP SPEC COLL POLYP POLYSTYR --

## (undated) DEVICE — SOLUTION IV 500ML 0.9% SOD CHL FLX CONT

## (undated) DEVICE — SUTURE STRATAFIX SYMMETRIC PDS + 1 SGL ARMED CT 18 IN LEN SXPP1A405

## (undated) DEVICE — SYSTEM SKIN CLSR 22CM DERMBND PRINEO

## (undated) DEVICE — ENDO CARRY-ON PROCEDURE KIT INCLUDES ENZYMATIC SPONGE, GAUZE, BIOHAZARD LABEL, TRAY, LUBRICANT, DIRTY SCOPE LABEL, WATER LABEL, TRAY, DRAWSTRING PAD, AND DEFENDO 4-PIECE KIT.: Brand: ENDO CARRY-ON PROCEDURE KIT

## (undated) DEVICE — BANDAGE COMPR W3XL186IN SYNTH KNIT DSGN COHESIVE ELAS ECON

## (undated) DEVICE — SPONGE GZ W4XL4IN RAYON POLY 4 PLY NONWOVEN FASTER WICKING

## (undated) DEVICE — DEVON™ KNEE AND BODY STRAP 60" X 3" (1.5 M X 7.6 CM): Brand: DEVON

## (undated) DEVICE — Device

## (undated) DEVICE — (D)PREP SKN CHLRAPRP APPL 26ML -- CONVERT TO ITEM 371833

## (undated) DEVICE — NEEDLE HYPO 25GA L1.5IN BVL ORIENTED ECLIPSE

## (undated) DEVICE — SUT VCRL + 2-0 36IN CT1 UD --

## (undated) DEVICE — MASTISOL ADHESIVE LIQ 2/3ML

## (undated) DEVICE — GARMENT,MEDLINE,DVT,INT,CALF,MED, GEN2: Brand: MEDLINE

## (undated) DEVICE — DRSG FOAM MEPILX PST OP 4X12IN --

## (undated) DEVICE — SYRINGE MED 20ML STD CLR PLAS LUERLOCK TIP N CTRL DISP

## (undated) DEVICE — SYR 5ML 1/5 GRAD LL NSAF LF --

## (undated) DEVICE — WRISTBAND ID AD W2.5XL9.5CM RED VYN ADH CLSR UNI-PRINT

## (undated) DEVICE — TUBING, SUCTION, 1/4" X 12', STRAIGHT: Brand: MEDLINE

## (undated) DEVICE — 5.5 MM ACROMIONIZER STRAIGHT                                    BURRS, POWER/EP-1, BROWN, 8000                                    MAXIMUM RPM, PACKAGED 6 PER BOX, STERILE

## (undated) DEVICE — CATHETER IV 22GA L1IN BLU POLYUR STR HUB RADPQ PROTCT +

## (undated) DEVICE — SYR 3ML LL TIP 1/10ML GRAD --

## (undated) DEVICE — CANNULA CUSH AD W/ 14FT TBG

## (undated) DEVICE — SOLUTION IRRIG 3000ML LAC R FLX CONT

## (undated) DEVICE — SINGLE PORT MANIFOLD: Brand: NEPTUNE 2

## (undated) DEVICE — KENDALL RADIOLUCENT FOAM MONITORING ELECTRODE RECTANGULAR SHAPE: Brand: KENDALL

## (undated) DEVICE — SOLUTION SURG PREP 26 CC PURPREP

## (undated) DEVICE — ZIMMER® STERILE DISPOSABLE TOURNIQUET CUFF WITH PROTECTIVE SLEEVE AND PLC, SINGLE PORT, SINGLE BLADDER, 18 IN. (46 CM)

## (undated) DEVICE — NDL PRT INJ NSAF BLNT 18GX1.5 --

## (undated) DEVICE — SHOULDER SUSPENSION KIT 6 PER BOX

## (undated) DEVICE — SYRINGE 50ML E/T

## (undated) DEVICE — FIRSTPASS ST SUTURE PASSER, SELF CAPTURE: Brand: FIRSTPASS

## (undated) DEVICE — NDL SPNE QNCKE 18GX3.5IN LF --

## (undated) DEVICE — SOLUTION IV 100ML 0.9% SOD CHL DIL INJ

## (undated) DEVICE — STERILE POLYISOPRENE POWDER-FREE SURGICAL GLOVES: Brand: PROTEXIS

## (undated) DEVICE — TUBE IRRIG L8IN LNG PT W/ CONN FOR PMP SYS REDEUCE

## (undated) DEVICE — (D)STRIP SKN CLSR 0.5X4IN WHT --

## (undated) DEVICE — BLADE SAW 1.19X20X90 MM FOR LG BNE

## (undated) DEVICE — CONCISE CEMENT SCULPS KIT: Brand: CONCISE

## (undated) DEVICE — TRNQT TEXT 1X18IN BLU LF DISP -- CONVERT TO ITEM 362165

## (undated) DEVICE — 4.5 MM INCISOR PLUS STRAIGHT                                    BLADES, POWER/EP-1, VIOLET, PACKAGED                                    6 PER BOX, STERILE

## (undated) DEVICE — BLADE SAW W13XL90MM 1.19MM PARA

## (undated) DEVICE — PACK PROCEDURE SURG SHLDR ORTHOPEDIC CUST

## (undated) DEVICE — CTS RELIEF KIT, CARPAL TUNNEL SYNDROME RELIEF KIT: Brand: CTS RELIEF KIT

## (undated) DEVICE — SOL IRRIGATION INJ NACL 0.9% 500ML BTL

## (undated) DEVICE — THE CANADY HYBRID PLASMA SCALPEL IS AN ELECTROSURGICAL PLASMA SCALPEL THAT USES AN 85MM BENDABLE PADDLE BLADE TIP. THE ELECTROSURGICAL PLASMA SCALPEL IS USED TO SIMULTANEOUSLY CUT AND COAGULATE BIOLOGICAL TISSUE.: Brand: CANADY HYBRID PLASMA PADDLE BLADE

## (undated) DEVICE — MAJ-1414 SINGLE USE ADPATER BIOPSY VALV: Brand: SINGLE USE ADAPTOR BIOPSY VALVE

## (undated) DEVICE — 3 BONE CEMENT MIXER: Brand: MIXEVAC

## (undated) DEVICE — STERILE TETRA-FLEX CF LF, 6IN X 11 YD: Brand: TETRA-FLEX™ CF

## (undated) DEVICE — SUPER TURBOVAC 90 INTEGRATED CABLE WAND ICW: Brand: COBLATION

## (undated) DEVICE — REM POLYHESIVE ADULT PATIENT RETURN ELECTRODE: Brand: VALLEYLAB

## (undated) DEVICE — NEEDLE SPNL 20GA L3.5IN YEL HUB S STL REG WALL FIT STYL W/

## (undated) DEVICE — KENDALL SCD EXPRESS FOOT CUFF, MEDIUM: Brand: KENDALL SCD

## (undated) DEVICE — SET ADMIN 16ML TBNG L100IN 2 Y INJ SITE IV PIGGY BK DISP (ORDER IN MULIPLES OF 48)

## (undated) DEVICE — SOL INJ L R 1000ML BG --

## (undated) DEVICE — 3M™ STERI-DRAPE™ INCISE DRAPE 1050 (60CM X 45CM): Brand: STERI-DRAPE™

## (undated) DEVICE — CATH IV SAFE STR 22GX1IN BLU -- PROTECTIV PLUS

## (undated) DEVICE — SUT VCRL + 1 36IN CT1 VIO --

## (undated) DEVICE — SYR 50ML LR LCK 1ML GRAD NSAF --

## (undated) DEVICE — GOWN,SIRUS,NONRNF,SETINSLV,XL,20/CS: Brand: MEDLINE

## (undated) DEVICE — BANDAGE COMPR W4INXL5YD ELAS CLP CLSR

## (undated) DEVICE — SOLUTION SCRB 4OZ 10% PVP I POVIDONE IOD TOP PAINT EXIDINE

## (undated) DEVICE — PACK PROCEDURE SURG EXTREMITY CUST

## (undated) DEVICE — CATH SUC CTRL PRT TRIFLO 14FR --

## (undated) DEVICE — SKIN MARKER,REGULAR TIP WITH RULER AND LABELS: Brand: DEVON

## (undated) DEVICE — PLUS HANDPIECE WITH SPRAY TIP: Brand: SURGILAV

## (undated) DEVICE — SET ADMIN 16ML TBNG L100IN 2 Y INJ SITE IV PIGGY BK DISP